# Patient Record
Sex: FEMALE | Race: WHITE | NOT HISPANIC OR LATINO | Employment: FULL TIME | URBAN - NONMETROPOLITAN AREA
[De-identification: names, ages, dates, MRNs, and addresses within clinical notes are randomized per-mention and may not be internally consistent; named-entity substitution may affect disease eponyms.]

---

## 2017-06-22 ENCOUNTER — TRANSCRIBE ORDERS (OUTPATIENT)
Dept: ADMINISTRATIVE | Facility: HOSPITAL | Age: 32
End: 2017-06-22

## 2017-06-22 DIAGNOSIS — E04.0 NONTOXIC (DIFFUSE) GOITER: Primary | ICD-10-CM

## 2021-03-18 ENCOUNTER — BULK ORDERING (OUTPATIENT)
Dept: CASE MANAGEMENT | Facility: OTHER | Age: 36
End: 2021-03-18

## 2021-03-18 DIAGNOSIS — Z23 IMMUNIZATION DUE: ICD-10-CM

## 2022-02-03 ENCOUNTER — OFFICE VISIT (OUTPATIENT)
Dept: CARDIOLOGY | Facility: CLINIC | Age: 37
End: 2022-02-03

## 2022-02-03 VITALS
BODY MASS INDEX: 26.03 KG/M2 | WEIGHT: 162 LBS | DIASTOLIC BLOOD PRESSURE: 76 MMHG | OXYGEN SATURATION: 99 % | TEMPERATURE: 97.8 F | SYSTOLIC BLOOD PRESSURE: 118 MMHG | HEIGHT: 66 IN | HEART RATE: 79 BPM

## 2022-02-03 DIAGNOSIS — Z13.220 SCREENING, LIPID: ICD-10-CM

## 2022-02-03 DIAGNOSIS — F32.A DEPRESSION, UNSPECIFIED DEPRESSION TYPE: ICD-10-CM

## 2022-02-03 DIAGNOSIS — F41.9 ANXIETY: ICD-10-CM

## 2022-02-03 DIAGNOSIS — R00.2 PALPITATIONS: Primary | ICD-10-CM

## 2022-02-03 DIAGNOSIS — K21.9 GASTROESOPHAGEAL REFLUX DISEASE WITHOUT ESOPHAGITIS: ICD-10-CM

## 2022-02-03 DIAGNOSIS — R07.2 PRECORDIAL PAIN: ICD-10-CM

## 2022-02-03 DIAGNOSIS — R53.83 FATIGUE, UNSPECIFIED TYPE: ICD-10-CM

## 2022-02-03 PROCEDURE — 93246 EXT ECG>7D<15D RECORDING: CPT | Performed by: INTERNAL MEDICINE

## 2022-02-03 PROCEDURE — 99204 OFFICE O/P NEW MOD 45 MIN: CPT | Performed by: NURSE PRACTITIONER

## 2022-02-03 RX ORDER — PANTOPRAZOLE SODIUM 40 MG/1
40 TABLET, DELAYED RELEASE ORAL DAILY
Qty: 90 TABLET | Refills: 1 | Status: SHIPPED | OUTPATIENT
Start: 2022-02-03 | End: 2023-01-13 | Stop reason: SDUPTHER

## 2022-02-03 RX ORDER — FLUOXETINE HYDROCHLORIDE 20 MG/1
20 CAPSULE ORAL DAILY
Qty: 90 CAPSULE | Refills: 0 | Status: SHIPPED | OUTPATIENT
Start: 2022-02-03 | End: 2022-03-17 | Stop reason: SDUPTHER

## 2022-02-03 NOTE — PROGRESS NOTES
Subjective   Diallo Spence is a 36 y.o. female.   Chief Complaint   Patient presents with   • Establish Care      History of Present Illness   Sridhar Song is a 35-year-old  female who presents to the clinic today as a new patient for evaluation.    Complaints of palpitations that have been going on for several years however seems to have worsened over the last several months.  She reports about 10 years ago she underwent a monitor and echocardiogram which were normal.  She saw cardiology about a year ago and underwent a holter monitor showing sinus bradycardia and sinus tachycardia.  She has not previously been on medications.  She denies any history of anemia or thyroid problems.  She reports symptoms seem to be worse with activity to the point it has limited her exercise because she is very symptomatic with tachycardia.  She states at times she has felt chest pressure.  She denies dyspnea.  She has occasionally experience lightheadedness but denies near syncope or syncope.  She does try to limit her caffeine intake.  No family history of A. fib or arrhythmias that she is aware of.    History of anxiety and depression and has been on Cymbalta recently however she has not been taking that for several weeks and prior to those several weeks had missed several doses.  He has been on Trintellix, Wellbutrin, Celexa, Zoloft and Prozac in the past.  She feels she tolerated Prozac the best and would like to consider starting back on Prozac.    GERD stable on Protonix, request refill    The following portions of the patient's history were reviewed and updated as appropriate: allergies, current medications, past family history, past medical history, past social history, past surgical history and problem list.    Current Outpatient Medications:   •  FLUoxetine (PROzac) 20 MG capsule, Take 1 capsule by mouth Daily., Disp: 90 capsule, Rfl: 0  •  pantoprazole (Protonix) 40 MG EC tablet, Take 1 tablet by  mouth Daily., Disp: 90 tablet, Rfl: 1    Review of Systems   Constitutional: Negative for activity change, appetite change, chills, diaphoresis, fatigue and fever.   HENT: Negative for congestion, drooling, ear discharge, ear pain, mouth sores, nosebleeds, postnasal drip, rhinorrhea, sinus pressure, sneezing and sore throat.    Eyes: Negative for pain, discharge and visual disturbance.   Respiratory: Negative for cough, chest tightness, shortness of breath and wheezing.    Cardiovascular: Positive for palpitations. Negative for chest pain and leg swelling.   Gastrointestinal: Negative for abdominal pain, constipation, diarrhea, nausea and vomiting.   Endocrine: Negative for cold intolerance, heat intolerance, polydipsia, polyphagia and polyuria.   Musculoskeletal: Negative for arthralgias, myalgias and neck pain.   Skin: Negative for rash and wound.   Neurological: Negative for syncope, speech difficulty, weakness, light-headedness and headaches.   Hematological: Negative for adenopathy. Does not bruise/bleed easily.   Psychiatric/Behavioral: Negative for confusion, dysphoric mood and sleep disturbance. The patient is not nervous/anxious.    All other systems reviewed and are negative.    Patient Active Problem List    Diagnosis    • Gastroesophageal reflux disease without esophagitis [K21.9]    • Anxiety [F41.9]    • Depression [F32.A]    • Palpitations [R00.2]      Past Medical History:   Diagnosis Date   • Depression    • GERD (gastroesophageal reflux disease)    • Liver problem    • Migraine headache      Past Surgical History:   Procedure Laterality Date   •  SECTION     • CHOLECYSTECTOMY     • COLONOSCOPY      / BEBETO Reynolds- Normal    • COLONOSCOPY N/A 2016    Procedure: COLONOSCOPY CPT CODE: 40725;  Surgeon: Sherry Muse DO;  Location: [facility];  Service:    • ENDOSCOPY N/A 2016    Procedure: ESOPHAGOGASTRODUODENOSCOPY WITH BIOPSY CPT CODE: 79849;  Surgeon: Sherry Fung  "Eliudon, DO;  Location: [facility] OR;  Service:    • GALLBLADDER SURGERY     • NERVE SURGERY      Right arm    • SHOULDER SURGERY     • TONSILLECTOMY     • UPPER GASTROINTESTINAL ENDOSCOPY  2010    Dr. Jeter/ Small hiatal hernia      /76 (BP Location: Left arm, Patient Position: Sitting, Cuff Size: Adult)   Pulse 79   Temp 97.8 °F (36.6 °C)   Ht 167.6 cm (66\")   Wt 73.5 kg (162 lb)   SpO2 99%   BMI 26.15 kg/m²     Objective   Allergies   Allergen Reactions   • Latex          Physical Exam  Vitals reviewed.   Constitutional:       Appearance: Normal appearance. She is well-developed.   HENT:      Head: Normocephalic.   Eyes:      Conjunctiva/sclera: Conjunctivae normal.   Neck:      Thyroid: No thyromegaly.      Vascular: No carotid bruit or JVD.   Cardiovascular:      Rate and Rhythm: Normal rate and regular rhythm.   Pulmonary:      Effort: Pulmonary effort is normal.      Breath sounds: Normal breath sounds.   Abdominal:      General: Bowel sounds are normal.      Palpations: Abdomen is soft. There is no hepatomegaly, splenomegaly or mass.   Musculoskeletal:      Cervical back: Normal range of motion and neck supple.      Right lower leg: No edema.      Left lower leg: No edema.   Skin:     General: Skin is warm and dry.   Neurological:      Mental Status: She is alert and oriented to person, place, and time.   Psychiatric:         Attention and Perception: Attention normal.         Mood and Affect: Mood normal.         Speech: Speech normal.         Behavior: Behavior normal. Behavior is cooperative.         Cognition and Memory: Cognition normal.           ECG 12 Lead    Date/Time: 2/4/2022 1:39 PM  Performed by: Cinda Alvarado APRN  Authorized by: Cinda Alvarado APRN   Rhythm: sinus rhythm  Rate: normal  BPM: 79    Clinical impression: normal ECG  Comments: QT/QTc-355/389          Assessment/Plan   Diagnoses and all orders for this visit:    1. Palpitations (Primary)  -     CBC & Differential; " Future  -     TSH; Future  -     T4, Free; Future  -     ECG 12 Lead  -     Adult Transthoracic Echo Complete W/ Cont if Necessary Per Protocol; Future  -     Holter Monitor - 72 Hour Up To 15 Days; Future  EKG, reviewed and discussed  Reviewed prior Holter monitor  Counseling in avoidance of caffeine utilization    2. Anxiety  Discussed at length risk and benefits of SSRIs.  She will reinitiate Prozac 20 mg daily.  She understands it may take 2 to 4 weeks before fully efficacious.  We discussed pregnancy classification as she is a woman of childbearing age.    3. Depression, unspecified depression type    4. Precordial pain  -     Treadmill Stress Test; Future    5. Gastroesophageal reflux disease without esophagitis  Refill Protonix    6. Fatigue, unspecified type  -     CBC & Differential; Future  -     TSH; Future  -     T4, Free; Future  -     Vitamin B12; Future  -     Vitamin D 25 Hydroxy; Future    7. Screening, lipid  -     Comprehensive Metabolic Panel; Future  -     Lipid Panel; Future    Other orders  -     FLUoxetine (PROzac) 20 MG capsule; Take 1 capsule by mouth Daily.  Dispense: 90 capsule; Refill: 0  -     pantoprazole (Protonix) 40 MG EC tablet; Take 1 tablet by mouth Daily.  Dispense: 90 tablet; Refill: 1    Further testing will be arranged  Follow-up in 6 weeks, sooner if needed

## 2022-02-04 PROBLEM — K21.9 GASTROESOPHAGEAL REFLUX DISEASE WITHOUT ESOPHAGITIS: Status: ACTIVE | Noted: 2022-02-04

## 2022-02-04 PROCEDURE — 93000 ELECTROCARDIOGRAM COMPLETE: CPT | Performed by: NURSE PRACTITIONER

## 2022-02-11 ENCOUNTER — HOSPITAL ENCOUNTER (OUTPATIENT)
Dept: CARDIOLOGY | Facility: HOSPITAL | Age: 37
Discharge: HOME | End: 2022-02-11

## 2022-02-11 DIAGNOSIS — R00.2 PALPITATIONS: ICD-10-CM

## 2022-02-11 DIAGNOSIS — R07.2 PRECORDIAL PAIN: ICD-10-CM

## 2022-02-11 PROCEDURE — 93306 TTE W/DOPPLER COMPLETE: CPT | Performed by: INTERNAL MEDICINE

## 2022-02-11 PROCEDURE — 93306 TTE W/DOPPLER COMPLETE: CPT

## 2022-02-11 PROCEDURE — 93017 CV STRESS TEST TRACING ONLY: CPT

## 2022-02-11 PROCEDURE — 93018 CV STRESS TEST I&R ONLY: CPT | Performed by: INTERNAL MEDICINE

## 2022-02-14 LAB
BH CV ECHO MEAS - ACS: 1.7 CM
BH CV ECHO MEAS - AO MAX PG (FULL): 0.85 MMHG
BH CV ECHO MEAS - AO MAX PG: 4.9 MMHG
BH CV ECHO MEAS - AO MEAN PG (FULL): 1 MMHG
BH CV ECHO MEAS - AO MEAN PG: 3 MMHG
BH CV ECHO MEAS - AO ROOT AREA (BSA CORRECTED): 1.5
BH CV ECHO MEAS - AO ROOT AREA: 6.2 CM^2
BH CV ECHO MEAS - AO ROOT DIAM: 2.8 CM
BH CV ECHO MEAS - AO V2 MAX: 111 CM/SEC
BH CV ECHO MEAS - AO V2 MEAN: 81.7 CM/SEC
BH CV ECHO MEAS - AO V2 VTI: 22.4 CM
BH CV ECHO MEAS - BSA(HAYCOCK): 1.9 M^2
BH CV ECHO MEAS - BSA: 1.8 M^2
BH CV ECHO MEAS - BZI_BMI: 26.1 KILOGRAMS/M^2
BH CV ECHO MEAS - BZI_METRIC_HEIGHT: 167.6 CM
BH CV ECHO MEAS - BZI_METRIC_WEIGHT: 73.5 KG
BH CV ECHO MEAS - EDV(CUBED): 74.1 ML
BH CV ECHO MEAS - EDV(MOD-SP2): 79.4 ML
BH CV ECHO MEAS - EDV(MOD-SP4): 72.1 ML
BH CV ECHO MEAS - EDV(TEICH): 78.6 ML
BH CV ECHO MEAS - EF(CUBED): 70.4 %
BH CV ECHO MEAS - EF(MOD-SP2): 62.1 %
BH CV ECHO MEAS - EF(MOD-SP4): 72 %
BH CV ECHO MEAS - EF(TEICH): 62.4 %
BH CV ECHO MEAS - ESV(CUBED): 22 ML
BH CV ECHO MEAS - ESV(MOD-SP2): 30.1 ML
BH CV ECHO MEAS - ESV(MOD-SP4): 20.2 ML
BH CV ECHO MEAS - ESV(TEICH): 29.6 ML
BH CV ECHO MEAS - FS: 33.3 %
BH CV ECHO MEAS - IVS/LVPW: 1
BH CV ECHO MEAS - IVSD: 0.5 CM
BH CV ECHO MEAS - LA DIMENSION: 3.1 CM
BH CV ECHO MEAS - LA/AO: 1.1
BH CV ECHO MEAS - LV DIASTOLIC VOL/BSA (35-75): 39.4 ML/M^2
BH CV ECHO MEAS - LV MASS(C)D: 55.9 GRAMS
BH CV ECHO MEAS - LV MASS(C)DI: 30.6 GRAMS/M^2
BH CV ECHO MEAS - LV MAX PG: 4.1 MMHG
BH CV ECHO MEAS - LV MEAN PG: 2 MMHG
BH CV ECHO MEAS - LV SYSTOLIC VOL/BSA (12-30): 11 ML/M^2
BH CV ECHO MEAS - LV V1 MAX: 101 CM/SEC
BH CV ECHO MEAS - LV V1 MEAN: 65.6 CM/SEC
BH CV ECHO MEAS - LV V1 VTI: 19 CM
BH CV ECHO MEAS - LVIDD: 4.2 CM
BH CV ECHO MEAS - LVIDS: 2.8 CM
BH CV ECHO MEAS - LVLD AP2: 8 CM
BH CV ECHO MEAS - LVLD AP4: 7.4 CM
BH CV ECHO MEAS - LVLS AP2: 6.7 CM
BH CV ECHO MEAS - LVLS AP4: 5.6 CM
BH CV ECHO MEAS - LVPWD: 0.5 CM
BH CV ECHO MEAS - MV A MAX VEL: 54.4 CM/SEC
BH CV ECHO MEAS - MV DEC SLOPE: 480 CM/SEC^2
BH CV ECHO MEAS - MV DEC TIME: 0.18 SEC
BH CV ECHO MEAS - MV E MAX VEL: 87.8 CM/SEC
BH CV ECHO MEAS - MV E/A: 1.6
BH CV ECHO MEAS - MV MAX PG: 3 MMHG
BH CV ECHO MEAS - MV MEAN PG: 1 MMHG
BH CV ECHO MEAS - MV V2 MAX: 86.2 CM/SEC
BH CV ECHO MEAS - MV V2 MEAN: 48.6 CM/SEC
BH CV ECHO MEAS - MV V2 VTI: 17.7 CM
BH CV ECHO MEAS - PA ACC TIME: 0.11 SEC
BH CV ECHO MEAS - PA MAX PG: 3.5 MMHG
BH CV ECHO MEAS - PA MEAN PG: 2 MMHG
BH CV ECHO MEAS - PA PR(ACCEL): 30 MMHG
BH CV ECHO MEAS - PA V2 MAX: 93.3 CM/SEC
BH CV ECHO MEAS - PA V2 MEAN: 70.7 CM/SEC
BH CV ECHO MEAS - PA V2 VTI: 17.1 CM
BH CV ECHO MEAS - PULM A REVS DUR: 0.13 SEC
BH CV ECHO MEAS - PULM A REVS VEL: 34.3 CM/SEC
BH CV ECHO MEAS - PULM DIAS VEL: 39 CM/SEC
BH CV ECHO MEAS - PULM S/D: 0.85
BH CV ECHO MEAS - PULM SYS VEL: 33 CM/SEC
BH CV ECHO MEAS - RAP SYSTOLE: 10 MMHG
BH CV ECHO MEAS - RVSP: 23.1 MMHG
BH CV ECHO MEAS - SI(AO): 75.4 ML/M^2
BH CV ECHO MEAS - SI(CUBED): 28.5 ML/M^2
BH CV ECHO MEAS - SI(MOD-SP2): 27 ML/M^2
BH CV ECHO MEAS - SI(MOD-SP4): 28.4 ML/M^2
BH CV ECHO MEAS - SI(TEICH): 26.8 ML/M^2
BH CV ECHO MEAS - SV(AO): 137.9 ML
BH CV ECHO MEAS - SV(CUBED): 52.1 ML
BH CV ECHO MEAS - SV(MOD-SP2): 49.3 ML
BH CV ECHO MEAS - SV(MOD-SP4): 51.9 ML
BH CV ECHO MEAS - SV(TEICH): 49 ML
BH CV ECHO MEAS - TR MAX VEL: 181 CM/SEC
BH CV STRESS BP STAGE 1: NORMAL
BH CV STRESS BP STAGE 2: NORMAL
BH CV STRESS BP STAGE 3: NORMAL
BH CV STRESS DURATION MIN STAGE 1: 3
BH CV STRESS DURATION MIN STAGE 2: 3
BH CV STRESS DURATION MIN STAGE 3: 3
BH CV STRESS DURATION SEC STAGE 1: 0
BH CV STRESS DURATION SEC STAGE 2: 0
BH CV STRESS DURATION SEC STAGE 3: 0
BH CV STRESS GRADE STAGE 1: 10
BH CV STRESS GRADE STAGE 2: 12
BH CV STRESS GRADE STAGE 3: 14
BH CV STRESS HR STAGE 1: 120
BH CV STRESS HR STAGE 2: 154
BH CV STRESS HR STAGE 3: 179
BH CV STRESS METS STAGE 1: 5
BH CV STRESS METS STAGE 2: 7.5
BH CV STRESS METS STAGE 3: 10
BH CV STRESS PROTOCOL 1: NORMAL
BH CV STRESS RECOVERY BP: NORMAL MMHG
BH CV STRESS RECOVERY HR: 105 BPM
BH CV STRESS SPEED STAGE 1: 1.7
BH CV STRESS SPEED STAGE 2: 2.5
BH CV STRESS SPEED STAGE 3: 3.4
BH CV STRESS STAGE 1: 1
BH CV STRESS STAGE 2: 2
BH CV STRESS STAGE 3: 3
MAXIMAL PREDICTED HEART RATE: 186 BPM
MAXIMAL PREDICTED HEART RATE: 186 BPM
PERCENT MAX PREDICTED HR: 96.24 %
STRESS BASELINE BP: NORMAL MMHG
STRESS BASELINE HR: 86 BPM
STRESS PERCENT HR: 113 %
STRESS POST ESTIMATED WORKLOAD: 10.1 METS
STRESS POST EXERCISE DUR MIN: 9 MIN
STRESS POST PEAK BP: NORMAL MMHG
STRESS POST PEAK HR: 179 BPM
STRESS TARGET HR: 158 BPM
STRESS TARGET HR: 158 BPM

## 2022-02-15 DIAGNOSIS — F98.8 ATTENTION DEFICIT DISORDER (ADD) IN ADULT: Primary | ICD-10-CM

## 2022-02-22 ENCOUNTER — TELEPHONE (OUTPATIENT)
Dept: CARDIOLOGY | Facility: CLINIC | Age: 37
End: 2022-02-22

## 2022-02-22 ENCOUNTER — TREATMENT (OUTPATIENT)
Dept: CARDIOLOGY | Facility: CLINIC | Age: 37
End: 2022-02-22

## 2022-02-22 DIAGNOSIS — I44.30 AV BLOCK: Primary | ICD-10-CM

## 2022-02-22 DIAGNOSIS — R00.2 PALPITATIONS: ICD-10-CM

## 2022-02-22 PROCEDURE — 93248 EXT ECG>7D<15D REV&INTERPJ: CPT | Performed by: INTERNAL MEDICINE

## 2022-02-22 NOTE — TELEPHONE ENCOUNTER
----- Message from JAX Jamison sent at 2/22/2022 11:26 AM EST -----  Holter monitor with episodes of sinus tachycardia. 1 episode of AV block recommend EP referral for second opinion.  ThanksCinda   ----- Message -----  From: Diallo Spence MA  Sent: 2/22/2022  10:10 AM EST  To: JAX Jamison    Please review EOS holter monitor.      ThanksDiallo

## 2022-03-17 ENCOUNTER — OFFICE VISIT (OUTPATIENT)
Dept: PSYCHIATRY | Facility: CLINIC | Age: 37
End: 2022-03-17

## 2022-03-17 VITALS
WEIGHT: 167.6 LBS | DIASTOLIC BLOOD PRESSURE: 70 MMHG | HEART RATE: 88 BPM | SYSTOLIC BLOOD PRESSURE: 114 MMHG | HEIGHT: 66 IN | BODY MASS INDEX: 26.93 KG/M2

## 2022-03-17 DIAGNOSIS — F90.2 ATTENTION DEFICIT HYPERACTIVITY DISORDER (ADHD), COMBINED TYPE: ICD-10-CM

## 2022-03-17 DIAGNOSIS — F39 UNSPECIFIED MOOD (AFFECTIVE) DISORDER: ICD-10-CM

## 2022-03-17 DIAGNOSIS — F41.9 ANXIETY DISORDER, UNSPECIFIED TYPE: Primary | ICD-10-CM

## 2022-03-17 PROCEDURE — 90792 PSYCH DIAG EVAL W/MED SRVCS: CPT | Performed by: NURSE PRACTITIONER

## 2022-03-17 RX ORDER — SUMATRIPTAN 25 MG/1
25 TABLET, FILM COATED ORAL ONCE AS NEEDED
Qty: 9 TABLET | Refills: 1 | Status: SHIPPED | OUTPATIENT
Start: 2022-03-17 | End: 2022-04-27 | Stop reason: SDUPTHER

## 2022-03-17 RX ORDER — FLUOXETINE HYDROCHLORIDE 20 MG/1
CAPSULE ORAL
Qty: 90 CAPSULE | Refills: 0
Start: 2022-03-17 | End: 2022-04-01 | Stop reason: SDUPTHER

## 2022-03-17 NOTE — PROGRESS NOTES
She reports a history of migraine headaches and been previously on Topamax and Imitrex.  She would like to have something to help with her headaches.  She is currently being evaluated by psych/mental health for adult ADD and is awaiting an EP evaluation.  Rx sent for Imitrex to use as needed for headaches.  We discussed the increased risk of serotonin syndrome with her current Prozac we have also discussed preventative measures of beta-blocker therapy or Topamax however we will wait until she is evaluated by other providers before proceeding with a preventative medication.

## 2022-03-18 ENCOUNTER — LAB (OUTPATIENT)
Dept: LAB | Facility: HOSPITAL | Age: 37
End: 2022-03-18

## 2022-03-18 DIAGNOSIS — R53.83 FATIGUE, UNSPECIFIED TYPE: ICD-10-CM

## 2022-03-18 DIAGNOSIS — R00.2 PALPITATIONS: ICD-10-CM

## 2022-03-18 DIAGNOSIS — Z13.220 SCREENING, LIPID: ICD-10-CM

## 2022-03-18 DIAGNOSIS — R53.83 FATIGUE, UNSPECIFIED TYPE: Primary | ICD-10-CM

## 2022-03-18 LAB
25(OH)D3 SERPL-MCNC: 20.6 NG/ML (ref 30–100)
ALBUMIN SERPL-MCNC: 4.7 G/DL (ref 3.5–5.2)
ALBUMIN/GLOB SERPL: 1.7 G/DL
ALP SERPL-CCNC: 69 U/L (ref 39–117)
ALT SERPL W P-5'-P-CCNC: 20 U/L (ref 1–33)
ANION GAP SERPL CALCULATED.3IONS-SCNC: 8.5 MMOL/L (ref 5–15)
AST SERPL-CCNC: 19 U/L (ref 1–32)
BASOPHILS # BLD AUTO: 0.07 10*3/MM3 (ref 0–0.2)
BASOPHILS NFR BLD AUTO: 1.2 % (ref 0–1.5)
BILIRUB SERPL-MCNC: 0.5 MG/DL (ref 0–1.2)
BUN SERPL-MCNC: 12 MG/DL (ref 6–20)
BUN/CREAT SERPL: 14.8 (ref 7–25)
CALCIUM SPEC-SCNC: 9.8 MG/DL (ref 8.6–10.5)
CHLORIDE SERPL-SCNC: 104 MMOL/L (ref 98–107)
CHOLEST SERPL-MCNC: 182 MG/DL (ref 0–200)
CO2 SERPL-SCNC: 23.5 MMOL/L (ref 22–29)
CREAT SERPL-MCNC: 0.81 MG/DL (ref 0.57–1)
DEPRECATED RDW RBC AUTO: 40.7 FL (ref 37–54)
EGFRCR SERPLBLD CKD-EPI 2021: 97.2 ML/MIN/1.73
EOSINOPHIL # BLD AUTO: 0.08 10*3/MM3 (ref 0–0.4)
EOSINOPHIL NFR BLD AUTO: 1.3 % (ref 0.3–6.2)
ERYTHROCYTE [DISTWIDTH] IN BLOOD BY AUTOMATED COUNT: 12.4 % (ref 12.3–15.4)
GLOBULIN UR ELPH-MCNC: 2.8 GM/DL
GLUCOSE SERPL-MCNC: 83 MG/DL (ref 65–99)
HCT VFR BLD AUTO: 40.5 % (ref 34–46.6)
HDLC SERPL-MCNC: 52 MG/DL (ref 40–60)
HGB BLD-MCNC: 13.4 G/DL (ref 12–15.9)
IMM GRANULOCYTES # BLD AUTO: 0.01 10*3/MM3 (ref 0–0.05)
IMM GRANULOCYTES NFR BLD AUTO: 0.2 % (ref 0–0.5)
LDLC SERPL CALC-MCNC: 118 MG/DL (ref 0–100)
LDLC/HDLC SERPL: 2.26 {RATIO}
LYMPHOCYTES # BLD AUTO: 2 10*3/MM3 (ref 0.7–3.1)
LYMPHOCYTES NFR BLD AUTO: 33.3 % (ref 19.6–45.3)
MCH RBC QN AUTO: 29.8 PG (ref 26.6–33)
MCHC RBC AUTO-ENTMCNC: 33.1 G/DL (ref 31.5–35.7)
MCV RBC AUTO: 90.2 FL (ref 79–97)
MONOCYTES # BLD AUTO: 0.36 10*3/MM3 (ref 0.1–0.9)
MONOCYTES NFR BLD AUTO: 6 % (ref 5–12)
NEUTROPHILS NFR BLD AUTO: 3.48 10*3/MM3 (ref 1.7–7)
NEUTROPHILS NFR BLD AUTO: 58 % (ref 42.7–76)
NRBC BLD AUTO-RTO: 0 /100 WBC (ref 0–0.2)
PLATELET # BLD AUTO: 330 10*3/MM3 (ref 140–450)
PMV BLD AUTO: 10.2 FL (ref 6–12)
POTASSIUM SERPL-SCNC: 4.3 MMOL/L (ref 3.5–5.2)
PROT SERPL-MCNC: 7.5 G/DL (ref 6–8.5)
RBC # BLD AUTO: 4.49 10*6/MM3 (ref 3.77–5.28)
SODIUM SERPL-SCNC: 136 MMOL/L (ref 136–145)
T4 FREE SERPL-MCNC: 1.21 NG/DL (ref 0.93–1.7)
TRIGL SERPL-MCNC: 63 MG/DL (ref 0–150)
TSH SERPL DL<=0.05 MIU/L-ACNC: 1.85 UIU/ML (ref 0.27–4.2)
VIT B12 BLD-MCNC: 586 PG/ML (ref 211–946)
VLDLC SERPL-MCNC: 12 MG/DL (ref 5–40)
WBC NRBC COR # BLD: 6 10*3/MM3 (ref 3.4–10.8)

## 2022-03-18 PROCEDURE — 84439 ASSAY OF FREE THYROXINE: CPT

## 2022-03-18 PROCEDURE — 82306 VITAMIN D 25 HYDROXY: CPT

## 2022-03-18 PROCEDURE — 80061 LIPID PANEL: CPT

## 2022-03-18 PROCEDURE — 36415 COLL VENOUS BLD VENIPUNCTURE: CPT

## 2022-03-18 PROCEDURE — 82607 VITAMIN B-12: CPT

## 2022-03-18 PROCEDURE — 80050 GENERAL HEALTH PANEL: CPT

## 2022-03-22 ENCOUNTER — OFFICE VISIT (OUTPATIENT)
Dept: CARDIOLOGY | Facility: CLINIC | Age: 37
End: 2022-03-22

## 2022-03-22 VITALS
HEART RATE: 88 BPM | WEIGHT: 167 LBS | HEIGHT: 66 IN | BODY MASS INDEX: 26.84 KG/M2 | DIASTOLIC BLOOD PRESSURE: 68 MMHG | OXYGEN SATURATION: 99 % | SYSTOLIC BLOOD PRESSURE: 96 MMHG | TEMPERATURE: 97.1 F

## 2022-03-22 DIAGNOSIS — G43.909 MIGRAINE WITHOUT STATUS MIGRAINOSUS, NOT INTRACTABLE, UNSPECIFIED MIGRAINE TYPE: ICD-10-CM

## 2022-03-22 DIAGNOSIS — F41.9 ANXIETY: ICD-10-CM

## 2022-03-22 DIAGNOSIS — F32.A DEPRESSION, UNSPECIFIED DEPRESSION TYPE: ICD-10-CM

## 2022-03-22 DIAGNOSIS — R00.2 PALPITATIONS: Primary | ICD-10-CM

## 2022-03-22 DIAGNOSIS — E55.9 VITAMIN D DEFICIENCY: ICD-10-CM

## 2022-03-22 DIAGNOSIS — K21.9 GASTROESOPHAGEAL REFLUX DISEASE WITHOUT ESOPHAGITIS: ICD-10-CM

## 2022-03-22 PROCEDURE — 99213 OFFICE O/P EST LOW 20 MIN: CPT | Performed by: NURSE PRACTITIONER

## 2022-03-22 RX ORDER — MELATONIN
1000 DAILY
Qty: 30 TABLET | Refills: 5 | Status: SHIPPED | OUTPATIENT
Start: 2022-03-22 | End: 2022-04-08

## 2022-04-01 ENCOUNTER — OFFICE VISIT (OUTPATIENT)
Dept: PSYCHIATRY | Facility: CLINIC | Age: 37
End: 2022-04-01

## 2022-04-01 VITALS
BODY MASS INDEX: 26.63 KG/M2 | WEIGHT: 165 LBS | DIASTOLIC BLOOD PRESSURE: 60 MMHG | HEART RATE: 79 BPM | SYSTOLIC BLOOD PRESSURE: 114 MMHG

## 2022-04-01 DIAGNOSIS — F41.9 ANXIETY DISORDER, UNSPECIFIED TYPE: ICD-10-CM

## 2022-04-01 DIAGNOSIS — F39 UNSPECIFIED MOOD (AFFECTIVE) DISORDER: ICD-10-CM

## 2022-04-01 DIAGNOSIS — F90.2 ATTENTION DEFICIT HYPERACTIVITY DISORDER (ADHD), COMBINED TYPE: Primary | ICD-10-CM

## 2022-04-01 PROCEDURE — 99213 OFFICE O/P EST LOW 20 MIN: CPT | Performed by: NURSE PRACTITIONER

## 2022-04-01 RX ORDER — LISDEXAMFETAMINE DIMESYLATE 10 MG/1
10 CAPSULE ORAL DAILY
Qty: 30 CAPSULE | Refills: 0 | Status: SHIPPED | OUTPATIENT
Start: 2022-04-01 | End: 2022-04-08 | Stop reason: ALTCHOICE

## 2022-04-01 RX ORDER — FLUOXETINE HYDROCHLORIDE 40 MG/1
40 CAPSULE ORAL DAILY
Qty: 30 CAPSULE | Refills: 0
Start: 2022-04-01 | End: 2022-04-12 | Stop reason: SDUPTHER

## 2022-04-01 RX ORDER — LISDEXAMFETAMINE DIMESYLATE 10 MG/1
10 CAPSULE ORAL DAILY
Qty: 30 CAPSULE | Refills: 0 | Status: SHIPPED | OUTPATIENT
Start: 2022-04-01 | End: 2022-04-01 | Stop reason: SDUPTHER

## 2022-04-01 NOTE — PROGRESS NOTES
Subjective   Diallo Spence is a 37 y.o. female is here today for medication management follow-up.    Chief Complaint:  ADHD anxiety     History of Present Illness:    Patient presents today for a follow up for medication management for ADHD and anxiety. Patient states she doing about the same with everything. Patient states going vacation next week. Patient states started prozac 40mg last Thursday and doing ok. Patient states still trouble with anger, irritability, mind racing, and frustration. Patient states daughter has been getting to her more due to daughter being more restless and hyper. Patient states the noises really get to her especially high pitched. Denies any panic attacks recently but waiting. Patient states her depression is at an 8 on a 0-10 scale with 10 being the worst. Patient states her anxiety has been an 8 on a 0-10 scale with 10 being the worst. Patient states her sleep is worse and sleeping more through the day. Patient states she has been nodding off at work and not sleeping good at night. Denies any nightmares and only getting a few hours a night. Patient states her appetite is about the same. Denies any thoughts to harm self or others. Denies any auditory or visual hallucinations. Patient states still getting distracted at work and doctor getting upset. Denies any medical changes since last visit.   The following portions of the patient's history were reviewed and updated as appropriate: allergies, current medications, past family history, past medical history, past social history, past surgical history and problem list.    Review of Systems   Constitutional: Negative.    Respiratory: Negative.    Cardiovascular: Negative.    Gastrointestinal: Negative.    Neurological: Negative.    Psychiatric/Behavioral: Positive for agitation, decreased concentration, dysphoric mood and sleep disturbance. The patient is nervous/anxious.        Objective   Physical Exam  Vitals reviewed.    Constitutional:       Appearance: Normal appearance. She is well-developed and well-groomed.   Neurological:      Mental Status: She is alert.   Psychiatric:         Attention and Perception: Attention and perception normal.         Mood and Affect: Affect normal. Mood is anxious.         Speech: Speech normal.         Behavior: Behavior normal. Behavior is cooperative.         Thought Content: Thought content normal.         Cognition and Memory: Cognition and memory normal.         Judgment: Judgment normal.       Blood pressure 114/60, pulse 79, weight 74.8 kg (165 lb). Body mass index is 26.63 kg/m².    Allergies   Allergen Reactions   • Latex      r  Medication List:   Current Outpatient Medications   Medication Sig Dispense Refill   • Cholecalciferol (Vitamin D3) 25 MCG (1000 UT) capsule Take 1 capsule by mouth Daily. 60 capsule 0   • FLUoxetine (PROzac) 40 MG capsule Take 1 capsule by mouth Daily. 90 capsule 1   • methylphenidate (Ritalin) 5 MG tablet Take 1 tablet by mouth 2 (Two) Times a Day with second dose no later than 2pm. 60 tablet 0   • metoprolol succinate XL (TOPROL-XL) 25 MG 24 hr tablet Take 1/2 tablet by mouth Daily. 30 tablet 3   • pantoprazole (Protonix) 40 MG EC tablet Take 1 tablet by mouth Daily. 90 tablet 1   • SUMAtriptan (Imitrex) 25 MG tablet Take 1 tablet by mouth 1 Time As Needed for Migraine. Take 1 tablet at onset of headache. May repeat dose 1 time in 2 hours if headache not relieved. 9 tablet 1     No current facility-administered medications for this visit.       Mental Status Exam:   Hygiene:   good  Cooperation:  Cooperative  Eye Contact:  Good  Psychomotor Behavior:  Appropriate  Affect:  Appropriate  Hopelessness: Denies  Speech:  Normal  Thought Process:  Goal directed and Linear  Thought Content:  Normal  Suicidal:  None  Homicidal:  None  Hallucinations:  None  Delusion:  None  Memory:  Intact  Orientation:  Person, Place, Time and Situation  Reliability:  fair  Insight:   Fair  Judgement:  Fair  Impulse Control:  Fair  Physical/Medical Issues:  No             Assessment/Plan   Diagnoses and all orders for this visit:    1. Attention deficit hyperactivity disorder (ADHD), combined type (Primary)  -     Discontinue: lisdexamfetamine dimesylate (Vyvanse) 10 MG capsule; Take 1 capsule by mouth Daily  Dispense: 30 capsule; Refill: 0  -     Discontinue: lisdexamfetamine dimesylate (Vyvanse) 10 MG capsule; Take 1 capsule by mouth Daily  Dispense: 30 capsule; Refill: 0    2. Anxiety disorder, unspecified type  -     Discontinue: FLUoxetine (PROzac) 40 MG capsule; Take 1 capsule by mouth Daily.  Dispense: 30 capsule; Refill: 0    3. Unspecified mood (affective) disorder (HCC)  -     Discontinue: FLUoxetine (PROzac) 40 MG capsule; Take 1 capsule by mouth Daily.  Dispense: 30 capsule; Refill: 0            Discussed medication options with patient. Start Vyvanse 10mg daily for ADHD. Cont. Prozac 40mg daily for depression and anxiety. Reviewed the risks, benefits, and side effects of the medications; patient acknowledged and verbally consented. Patient was provided education regarding treatment and treatment options.  Extensive education is provided regarding stimulants. Cardiac risk, risk of growth delay, weight loss, and cardiac issues. Patient is being prescribed a controlled substance as part of treatment plan.    Patient is agreeable to call the office with any questions, concerns, or worsening of symptoms. Patient is aware to call 911 or go to the nearest ER should begin having SI/HI.        Follow up in four weeks        Errors in dictation may reflect use of voice recognition software and not all errors in transcription may have been detected prior to signing.              This document has been electronically signed by JAX Avalos   April 19, 2022 13:28 EDT

## 2022-04-07 NOTE — PROGRESS NOTES
Cardiac Electrophysiology Outpatient Note  [facility]    Office Visit     Diallo Spence  [id number]  2022    Primary Care Physician: Cinda Alvarado APRN    Referred By: Cinda Alvarado APRN    CC: AV block, palpitations, sinus tachycardia    Problem List:  1. Palpitations  a. 14 day Holter monitor 2022: One non-conducted p wave at 3:51 AM (page 13 of report). Predominantly normal sinus rhythm average heart rate 79, minimum 59, max 177.  No atrial fibrillation.  PVC burden 1%  b. Echo 2022: EF 66 to 70%  c. Treadmill stress test 2022: Low risk study  2. GERD  3. Migraines  4. Anxiety  5. Depression  6. ADHD  7. Liver problem?  8. Panic disorder    History of Present Illness:   Diallo Spence is a 37 y.o. female who presents to the electrophysiology clinic for consultation regarding palpitations requested by JAX Ramirez.  She recently wore a Holter monitor for 14 days in 2022 which did reveal one non conducted p wave, EP consult was made. She originally sought care for palpitations and tachycardia. She began running several months ago and noticed that her heart rate would remain high (130s) until the day afterwards. Heart rate would get and remain higher with increased activity, reports maintained 180 bpm for ~ a couple hours. Associated dizziness, extreme fatigue afterwards. Some chest pain noted after exercise, resolved with time and limited activity, does not radiate. She is presenting today at the request of her cardiologist to check if it is okay for her to go on a beta-blocker for the previously mentioned problems.  She is also wondering if it is okay for her to start a Ritalin prescription for her ADD.    Past Surgical History:   Procedure Laterality Date   •  SECTION     • CHOLECYSTECTOMY     • COLONOSCOPY      / BEBETO Reynolds- Normal    • COLONOSCOPY N/A 2016    Procedure: COLONOSCOPY  CPT CODE: 71063;  Surgeon: Sherry Muse DO;  Location:  [facility] OR;  Service:    • ENDOSCOPY N/A 8/30/2016    Procedure: ESOPHAGOGASTRODUODENOSCOPY WITH BIOPSY CPT CODE: 53172;  Surgeon: Sherry Muse DO;  Location:  [facility] OR;  Service:    • GALLBLADDER SURGERY     • NERVE SURGERY      Right arm    • SHOULDER SURGERY     • TONSILLECTOMY     • UPPER GASTROINTESTINAL ENDOSCOPY  2010    Dr. Jeter/ Small hiatal hernia        Family History   Problem Relation Age of Onset   • No Known Problems Mother    • Diabetes Father    • Atrial fibrillation Father    • Hypertension Father    • Hyperlipidemia Father    • Drug abuse Brother    • Anxiety disorder Brother    • Alcohol abuse Brother    • Drug abuse Brother    • Depression Brother    • Heart disease Maternal Grandmother    • Bone cancer Paternal Grandfather    • Diabetes Paternal Grandfather    • Dementia Paternal Grandmother    • Heart disease Paternal Grandmother    • Diabetes Other    • Heart disease Other    • Hypertension Other        Social History     Socioeconomic History   • Marital status:      Spouse name: Davon Goel    • Number of children: 1   • Years of education: [employer]    Tobacco Use   • Smoking status: Never Smoker   • Smokeless tobacco: Never Used   Vaping Use   • Vaping Use: Never used   Substance and Sexual Activity   • Alcohol use: Yes     Comment: very limited socially   • Drug use: No   • Sexual activity: Yes         Current Outpatient Medications:   •  FLUoxetine (PROzac) 40 MG capsule, Take 1 capsule by mouth Daily., Disp: 30 capsule, Rfl: 0  •  pantoprazole (Protonix) 40 MG EC tablet, Take 1 tablet by mouth Daily., Disp: 90 tablet, Rfl: 1  •  SUMAtriptan (Imitrex) 25 MG tablet, Take 1 tablet by mouth 1 Time As Needed for Migraine. Take 1 tablet at onset of headache. May repeat dose 1 time in 2 hours if headache not relieved., Disp: 9 tablet, Rfl: 1    Allergies:   Allergies   Allergen Reactions   • Latex   "      Review of Systems:  Review of Systems   Cardiovascular: Positive for leg swelling. Negative for near-syncope and syncope.        Occasional leg swelling at the end of the day.   Respiratory: Negative for shortness of breath.         Vital Signs: Blood pressure 122/84, pulse 86, height 167.6 cm (66\"), weight 75.3 kg (166 lb), SpO2 99 %.    Physical Exam  Vitals and nursing note reviewed.   Cardiovascular:      Rate and Rhythm: Normal rate and regular rhythm.      Heart sounds: Normal heart sounds.      Comments: Sinus arrhythmia  Pulmonary:      Effort: Pulmonary effort is normal.      Breath sounds: Normal breath sounds.   Musculoskeletal:      Right lower leg: No edema.      Left lower leg: No edema.   Neurological:      Mental Status: She is alert and oriented to person, place, and time.   Psychiatric:         Behavior: Behavior is cooperative.         Lab Results   Component Value Date    GLUCOSE 83 03/18/2022    CALCIUM 9.8 03/18/2022     03/18/2022    K 4.3 03/18/2022    CO2 23.5 03/18/2022     03/18/2022    BUN 12 03/18/2022    CREATININE 0.81 03/18/2022    EGFRIFNONA 71 08/16/2016    BCR 14.8 03/18/2022    ANIONGAP 8.5 03/18/2022     Lab Results   Component Value Date    WBC 6.00 03/18/2022    HGB 13.4 03/18/2022    HCT 40.5 03/18/2022    MCV 90.2 03/18/2022     03/18/2022     No results found for: INR, PROTIME  Lab Results   Component Value Date    TSH 1.850 03/18/2022        Results for orders placed during the hospital encounter of 02/11/22    Adult Transthoracic Echo Complete W/ Cont if Necessary Per Protocol    Interpretation Summary  · Normal left ventricular cavity size and wall thickness noted.  · Left ventricular ejection fraction appears to be 66 - 70%.  · Left ventricular diastolic function was normal.  · Aortic valve is a trileaflet valve, there is no evidence of aortic stenosis or aortic regurgitation.  · Mitral valve echo shows some calcification of chordae, no mitral " stenosis or regurgitation detected.  · Tricuspid valve echo is normal no significant tricuspid regurgitation noted. No evidence of pulmonary hypertension.  · No pulmonic stenosis or regurgitation detected.  · Is no evidence of pericardial effusion.      I personally viewed and interpreted the patient's EKG/Telemetry/lab data.      ECG 12 Lead    Date/Time: 4/8/2022 2:02 PM  Performed by: Maribel Thomas APRN  Authorized by: Maribel Thomas APRN   Comparison: compared with previous ECG from 2/3/2022  Similar to previous ECG  Rhythm: sinus rhythm and sinus arrhythmia  Rate: normal  BPM: 73  QRS axis: normal              Assessment & Plan    1. Palpitations  - 37-year-old  female patient presenting today for consultation after wearing a event monitor for episodes of tachycardia.  While wearing the monitor was noted that she had one nonconducted P wave during sleep.  Because of this she was sent to EP for approval for beta-blocker treatment.  Intermittent AV block during sleep in a young healthy person is physiologic and likely associated with elevated vagal tone.  In patients with very high degrees of AV block during sleep (which she does not have), one also could consider sleep apnea as another cause that will increase vagal tone even further.  However, given this was a single event I am not sure further work-up is necessary but could be considered in the future.  She should likely be okay to trial beta-blocker therapy if this is indicated.  - Patient also asked about starting Ritalin, which is likely okay as well.  - She can follow-up with her usual cardiologist  - Follow-up as needed     Follow Up:  Return if symptoms worsen or fail to improve.    Scribed for George Gooden MD by Maribel CAMARA. 4/8/2022  15:32 EDT    I, George Gooden MD, personally performed the services described in this documentation as scribed by the above named individual in my presence, and it is both accurate and complete.   4/8/2022  15:32 EDT

## 2022-04-08 ENCOUNTER — OFFICE VISIT (OUTPATIENT)
Dept: CARDIOLOGY | Facility: CLINIC | Age: 37
End: 2022-04-08

## 2022-04-08 ENCOUNTER — TELEPHONE (OUTPATIENT)
Dept: PSYCHIATRY | Facility: CLINIC | Age: 37
End: 2022-04-08

## 2022-04-08 VITALS
BODY MASS INDEX: 26.68 KG/M2 | OXYGEN SATURATION: 99 % | DIASTOLIC BLOOD PRESSURE: 84 MMHG | WEIGHT: 166 LBS | HEIGHT: 66 IN | HEART RATE: 86 BPM | SYSTOLIC BLOOD PRESSURE: 122 MMHG

## 2022-04-08 DIAGNOSIS — R00.2 PALPITATIONS: Primary | ICD-10-CM

## 2022-04-08 PROCEDURE — 99203 OFFICE O/P NEW LOW 30 MIN: CPT | Performed by: STUDENT IN AN ORGANIZED HEALTH CARE EDUCATION/TRAINING PROGRAM

## 2022-04-08 PROCEDURE — 93000 ELECTROCARDIOGRAM COMPLETE: CPT | Performed by: STUDENT IN AN ORGANIZED HEALTH CARE EDUCATION/TRAINING PROGRAM

## 2022-04-08 NOTE — TELEPHONE ENCOUNTER
04/08/22 at 11:13am Called patient and discussed canceling Vyvanse and starting Ritalin 5mg twice daily for ADHD. Discussed with patient risks, benefits, and side effects of medication. Patient acknowledged and agreed.   Patient was provided education regarding treatment and treatment options.  Extensive education is provided regarding stimulants. Cardiac risk, risk of growth delay, weight loss, and cardiac issues. Patient is being prescribed a controlled substance as part of treatment plan.     Patient states she has an appointment with EP cardiology today at 1pm to discuss dropped heart beat on Holter monitor. Discussed with patient to discuss with provider starting Ritalin for ADHD. Discussed with patient after appointment notify our office regarding provider assessment and will send Ritalin in to pharmacy. Patient acknowledged and agreed.

## 2022-04-12 DIAGNOSIS — F39 UNSPECIFIED MOOD (AFFECTIVE) DISORDER: ICD-10-CM

## 2022-04-12 DIAGNOSIS — F90.2 ATTENTION DEFICIT HYPERACTIVITY DISORDER (ADHD), COMBINED TYPE: Primary | ICD-10-CM

## 2022-04-12 DIAGNOSIS — F41.9 ANXIETY DISORDER, UNSPECIFIED TYPE: ICD-10-CM

## 2022-04-12 RX ORDER — FLUOXETINE HYDROCHLORIDE 40 MG/1
40 CAPSULE ORAL DAILY
Qty: 90 CAPSULE | Refills: 1 | Status: SHIPPED | OUTPATIENT
Start: 2022-04-12 | End: 2022-04-29 | Stop reason: SDUPTHER

## 2022-04-12 RX ORDER — METOPROLOL SUCCINATE 25 MG/1
12.5 TABLET, EXTENDED RELEASE ORAL DAILY
Qty: 30 TABLET | Refills: 3 | Status: SHIPPED | OUTPATIENT
Start: 2022-04-12 | End: 2022-07-05

## 2022-04-12 RX ORDER — METHYLPHENIDATE HYDROCHLORIDE 5 MG/1
5 TABLET ORAL 2 TIMES DAILY
Qty: 60 TABLET | Refills: 0 | Status: SHIPPED | OUTPATIENT
Start: 2022-04-12 | End: 2022-04-29 | Stop reason: SDUPTHER

## 2022-04-12 NOTE — PROGRESS NOTES
Discussed recent visit with EP and mental health provider.  EP has recommended proceeding with beta-blocker therapy for better control heart rate. Mental health provider has recently started on Ritalin which may induce tachycardia discussed risk and benefits of beta-blocker with patient, she is willing to proceed. Start Toprol-XL 12.5 mg daily with close monitoring her blood pressure and heart rate.

## 2022-04-27 RX ORDER — SUMATRIPTAN 25 MG/1
25 TABLET, FILM COATED ORAL ONCE AS NEEDED
Qty: 9 TABLET | Refills: 1 | Status: SHIPPED | OUTPATIENT
Start: 2022-04-27 | End: 2022-07-05 | Stop reason: SDUPTHER

## 2022-04-29 ENCOUNTER — TELEMEDICINE (OUTPATIENT)
Dept: PSYCHIATRY | Facility: CLINIC | Age: 37
End: 2022-04-29

## 2022-04-29 DIAGNOSIS — F41.9 ANXIETY DISORDER, UNSPECIFIED TYPE: ICD-10-CM

## 2022-04-29 DIAGNOSIS — F90.2 ATTENTION DEFICIT HYPERACTIVITY DISORDER (ADHD), COMBINED TYPE: ICD-10-CM

## 2022-04-29 DIAGNOSIS — F39 UNSPECIFIED MOOD (AFFECTIVE) DISORDER: ICD-10-CM

## 2022-04-29 PROCEDURE — 99213 OFFICE O/P EST LOW 20 MIN: CPT | Performed by: NURSE PRACTITIONER

## 2022-04-29 RX ORDER — METHYLPHENIDATE HYDROCHLORIDE 10 MG/1
10 TABLET ORAL 2 TIMES DAILY
Qty: 60 TABLET | Refills: 0 | Status: SHIPPED | OUTPATIENT
Start: 2022-04-29 | End: 2022-06-03

## 2022-04-29 RX ORDER — FLUOXETINE HYDROCHLORIDE 40 MG/1
40 CAPSULE ORAL DAILY
Qty: 90 CAPSULE | Refills: 1
Start: 2022-04-29 | End: 2022-06-03 | Stop reason: SDUPTHER

## 2022-04-29 NOTE — PROGRESS NOTES
"      Subjective   Diallo Spence is a 37 y.o. female is here today for medication management follow-up.    This provider is located at [facility] at [address] [place] [address]. The Patient is seen remotely at home, using Minimally invasive devices marielle. Patient is being seen via telehealth and stated they are in a secure environment for this session. The patient's condition being diagnosed/treated is appropriate for telemedicine. The provider identified him/herself as well as his or her credentials. The patient and/or patients guardian consent to be seen remotely, and when consent is given they understand that the consent allows for patient identifiable information to be sent to a third party as needed.  They may refuse to be seen remotely at any time. The electronic data is encrypted and password protected, and the patient has been advised of the potential risks to privacy not withstanding such measures.      Chief Complaint:  ADHD anxiety     History of Present Illness:    Patient presents today for a follow up for medication management for ADHD and anxiety. Patient states its going ok and anxiety is still high. Patient states depression is a little better than what it was. Patient states taking the Methylphenidate twice a day. Denies any panic attacks. Patient denies any side effects to medication. Patient states still getting distracted at work easily and its been worse this past week. Patient states unable to keep on track and \"connect the dots\" at work. Patient states not sleeping and sleeping about the same amount of hours. Patient states still having nightmares. Patient states appetite is about the same. Patient reports her depression is at a 6-7 on a 0-10 scale with 10 being the worst. Patient reports anxiety is at an 8 on a 0-10 scale with 10 being the worst. Denies any thoughts to harm self or others. Patient denies any auditory or visual hallucinations. Denies any medical changes since last visit. "   The following portions of the patient's history were reviewed and updated as appropriate: allergies, current medications, past family history, past medical history, past social history, past surgical history and problem list.    Review of Systems   Constitutional: Negative.    Respiratory: Negative.    Cardiovascular: Negative.    Gastrointestinal: Negative.    Neurological: Negative.    Psychiatric/Behavioral: Positive for agitation, decreased concentration, dysphoric mood and sleep disturbance. The patient is nervous/anxious.        Objective   Physical Exam  Constitutional:       Appearance: Normal appearance. She is well-developed and well-groomed.   Neurological:      Mental Status: She is alert.   Psychiatric:         Attention and Perception: Attention and perception normal.         Mood and Affect: Affect normal. Mood is anxious.         Speech: Speech normal.         Behavior: Behavior normal. Behavior is cooperative.         Thought Content: Thought content normal.         Cognition and Memory: Cognition and memory normal.         Judgment: Judgment is impulsive.       There were no vitals taken for this visit. There is no height or weight on file to calculate BMI.  Unable to obtain vitals due to video visit.     Allergies   Allergen Reactions   • Latex        Medication List:   Current Outpatient Medications   Medication Sig Dispense Refill   • FLUoxetine (PROzac) 40 MG capsule Take 1 capsule by mouth Daily. 90 capsule 1   • methylphenidate (Ritalin) 10 MG tablet Take 1 tablet by mouth 2 (Two) Times a Day. Second dose no later than 2pm. 60 tablet 0   • Cholecalciferol (Vitamin D3) 25 MCG (1000 UT) capsule Take 1 capsule by mouth Daily. 60 capsule 0   • metoprolol succinate XL (TOPROL-XL) 25 MG 24 hr tablet Take 1/2 tablet by mouth Daily. 30 tablet 3   • pantoprazole (Protonix) 40 MG EC tablet Take 1 tablet by mouth Daily. 90 tablet 1   • SUMAtriptan (Imitrex) 25 MG tablet Take 1 tablet by mouth 1 Time  As Needed for Migraine. Take 1 tablet at onset of headache. May repeat dose 1 time in 2 hours if headache not relieved. 9 tablet 1     No current facility-administered medications for this visit.       Mental Status Exam:   Hygiene:   good  Cooperation:  Cooperative  Eye Contact:  Good  Psychomotor Behavior:  Appropriate  Affect:  Appropriate  Hopelessness: Denies  Speech:  Normal  Thought Process:  Goal directed and Linear  Thought Content:  Normal  Suicidal:  None  Homicidal:  None  Hallucinations:  None  Delusion:  None  Memory:  Intact  Orientation:  Person, Place, Time and Situation  Reliability:  fair  Insight:  Fair  Judgement:  Fair  Impulse Control:  Fair  Physical/Medical Issues:  No               Assessment/Plan   Diagnoses and all orders for this visit:    1. Anxiety disorder, unspecified type  -     FLUoxetine (PROzac) 40 MG capsule; Take 1 capsule by mouth Daily.  Dispense: 90 capsule; Refill: 1    2. Unspecified mood (affective) disorder (HCC)  -     FLUoxetine (PROzac) 40 MG capsule; Take 1 capsule by mouth Daily.  Dispense: 90 capsule; Refill: 1    3. Attention deficit hyperactivity disorder (ADHD), combined type  -     methylphenidate (Ritalin) 10 MG tablet; Take 1 tablet by mouth 2 (Two) Times a Day. Second dose no later than 2pm.  Dispense: 60 tablet; Refill: 0            Discussed medication options with patient. Increase Methylphenidate to 10mg twice daily for worsening ADHD. Cont. Prozac 40mg daily for depression and anxiety. Reviewed the risks, benefits, and side effects of the medications; patient acknowledged and verbally consented. Patient was provided education regarding treatment and treatment options.  Extensive education is provided regarding stimulants. Cardiac risk, risk of growth delay, weight loss, and cardiac issues. Patient is being prescribed a controlled substance as part of treatment plan. Will obtain UDS at next in office visit.   [facility] Patient Controlled Substance Report  (from 4/29/2021 to 4/29/2022)    Dispensed  Strength Quantity Days Supply Provider Pharmacy   04/12/2022 Methylphenidate Hydrochlo 5MG 60 each 30 CLOUD, [facility]...           Patient is agreeable to call the office with any questions, concerns, or worsening of symptoms. Patient is aware to call 911 or go to the nearest ER should begin having SI/HI.          Follow up in four weeks      Errors in dictation may reflect use of voice recognition software and not all errors in transcription may have been detected prior to signing.              This document has been electronically signed by JAX Avalos   April 29, 2022 13:01 EDT

## 2022-05-20 ENCOUNTER — TELEPHONE (OUTPATIENT)
Dept: CARDIOLOGY | Facility: CLINIC | Age: 37
End: 2022-05-20

## 2022-05-20 DIAGNOSIS — R10.84 GENERALIZED ABDOMINAL PAIN: Primary | ICD-10-CM

## 2022-05-20 NOTE — TELEPHONE ENCOUNTER
Pt is wondering if you would order a CBC.  Pt has been having stomach pain for last 2 weeks and is paranoid that her colitis is back.  Is this ok ?

## 2022-05-24 ENCOUNTER — LAB (OUTPATIENT)
Dept: LAB | Facility: HOSPITAL | Age: 37
End: 2022-05-24

## 2022-05-24 DIAGNOSIS — R10.84 GENERALIZED ABDOMINAL PAIN: ICD-10-CM

## 2022-05-24 LAB
ALBUMIN SERPL-MCNC: 4.56 G/DL (ref 3.5–5.2)
ALBUMIN/GLOB SERPL: 1.4 G/DL
ALP SERPL-CCNC: 70 U/L (ref 39–117)
ALT SERPL W P-5'-P-CCNC: 8 U/L (ref 1–33)
AMYLASE SERPL-CCNC: 71 U/L (ref 28–100)
ANION GAP SERPL CALCULATED.3IONS-SCNC: 10.7 MMOL/L (ref 5–15)
AST SERPL-CCNC: 16 U/L (ref 1–32)
BASOPHILS # BLD AUTO: 0.05 10*3/MM3 (ref 0–0.2)
BASOPHILS NFR BLD AUTO: 0.6 % (ref 0–1.5)
BILIRUB SERPL-MCNC: 0.7 MG/DL (ref 0–1.2)
BUN SERPL-MCNC: 10 MG/DL (ref 6–20)
BUN/CREAT SERPL: 13.5 (ref 7–25)
CALCIUM SPEC-SCNC: 9.6 MG/DL (ref 8.6–10.5)
CHLORIDE SERPL-SCNC: 103 MMOL/L (ref 98–107)
CO2 SERPL-SCNC: 21.3 MMOL/L (ref 22–29)
CREAT SERPL-MCNC: 0.74 MG/DL (ref 0.57–1)
DEPRECATED RDW RBC AUTO: 43.6 FL (ref 37–54)
EGFRCR SERPLBLD CKD-EPI 2021: 108.4 ML/MIN/1.73
EOSINOPHIL # BLD AUTO: 0.08 10*3/MM3 (ref 0–0.4)
EOSINOPHIL NFR BLD AUTO: 1 % (ref 0.3–6.2)
ERYTHROCYTE [DISTWIDTH] IN BLOOD BY AUTOMATED COUNT: 13.2 % (ref 12.3–15.4)
GLOBULIN UR ELPH-MCNC: 3.2 GM/DL
GLUCOSE SERPL-MCNC: 91 MG/DL (ref 65–99)
HCT VFR BLD AUTO: 39.7 % (ref 34–46.6)
HGB BLD-MCNC: 13.3 G/DL (ref 12–15.9)
IMM GRANULOCYTES # BLD AUTO: 0.03 10*3/MM3 (ref 0–0.05)
IMM GRANULOCYTES NFR BLD AUTO: 0.4 % (ref 0–0.5)
LIPASE SERPL-CCNC: 42 U/L (ref 13–60)
LYMPHOCYTES # BLD AUTO: 2.26 10*3/MM3 (ref 0.7–3.1)
LYMPHOCYTES NFR BLD AUTO: 27.5 % (ref 19.6–45.3)
MCH RBC QN AUTO: 29.9 PG (ref 26.6–33)
MCHC RBC AUTO-ENTMCNC: 33.5 G/DL (ref 31.5–35.7)
MCV RBC AUTO: 89.2 FL (ref 79–97)
MONOCYTES # BLD AUTO: 0.45 10*3/MM3 (ref 0.1–0.9)
MONOCYTES NFR BLD AUTO: 5.5 % (ref 5–12)
NEUTROPHILS NFR BLD AUTO: 5.36 10*3/MM3 (ref 1.7–7)
NEUTROPHILS NFR BLD AUTO: 65 % (ref 42.7–76)
NRBC BLD AUTO-RTO: 0 /100 WBC (ref 0–0.2)
PLATELET # BLD AUTO: 310 10*3/MM3 (ref 140–450)
PMV BLD AUTO: 9.4 FL (ref 6–12)
POTASSIUM SERPL-SCNC: 4.5 MMOL/L (ref 3.5–5.2)
PROT SERPL-MCNC: 7.8 G/DL (ref 6–8.5)
RBC # BLD AUTO: 4.45 10*6/MM3 (ref 3.77–5.28)
SODIUM SERPL-SCNC: 135 MMOL/L (ref 136–145)
WBC NRBC COR # BLD: 8.23 10*3/MM3 (ref 3.4–10.8)

## 2022-05-24 PROCEDURE — 80053 COMPREHEN METABOLIC PANEL: CPT

## 2022-05-24 PROCEDURE — 83690 ASSAY OF LIPASE: CPT

## 2022-05-24 PROCEDURE — 82150 ASSAY OF AMYLASE: CPT

## 2022-05-24 PROCEDURE — 36415 COLL VENOUS BLD VENIPUNCTURE: CPT

## 2022-05-24 PROCEDURE — 85025 COMPLETE CBC W/AUTO DIFF WBC: CPT

## 2022-06-03 ENCOUNTER — OFFICE VISIT (OUTPATIENT)
Dept: PSYCHIATRY | Facility: CLINIC | Age: 37
End: 2022-06-03

## 2022-06-03 DIAGNOSIS — F39 UNSPECIFIED MOOD (AFFECTIVE) DISORDER: ICD-10-CM

## 2022-06-03 DIAGNOSIS — F90.2 ATTENTION DEFICIT HYPERACTIVITY DISORDER (ADHD), COMBINED TYPE: ICD-10-CM

## 2022-06-03 DIAGNOSIS — F41.9 ANXIETY DISORDER, UNSPECIFIED TYPE: ICD-10-CM

## 2022-06-03 PROCEDURE — 99213 OFFICE O/P EST LOW 20 MIN: CPT | Performed by: NURSE PRACTITIONER

## 2022-06-03 RX ORDER — METHYLPHENIDATE HYDROCHLORIDE 18 MG/1
18 TABLET ORAL DAILY
Qty: 30 TABLET | Refills: 0 | Status: ON HOLD | OUTPATIENT
Start: 2022-06-03 | End: 2022-07-01

## 2022-06-03 RX ORDER — FLUOXETINE HYDROCHLORIDE 20 MG/1
60 CAPSULE ORAL DAILY
Qty: 90 CAPSULE | Refills: 0 | Status: SHIPPED | OUTPATIENT
Start: 2022-06-03 | End: 2022-11-04

## 2022-06-03 NOTE — PROGRESS NOTES
Subjective   Diallo Spence is a 37 y.o. female is here today for medication management follow-up.    Chief Complaint:  ADHD anxiety     History of Present Illness:   Patient presents today for a follow up for medication management for ADHD and anxiety. Patient states the ritalin isn't working well and depression has been bad. Patient states had a couple good weeks then felt bad. Patient states doing things at home without paying attention. Patient states such as mopping floor without  due to forgetting  in water and spraying deodorant in hair. Patient states sleep is getting worse and sleeping more through the day. Patient reports still having same nightmares and having night sweats. Patient states having the night sweats for about two weeks. Patient states only sleeping about 4-5 hours a night but sometimes taking a nap during the day. Patient states appetite is about the same and still snacking a lot. Patient states struggling with what to do about work. Patient states having a lot of problems with building up anger. Denies any thoughts to harm self or others. Denies any auditory or visual hallucinations. Denies any medical changes since last visit. Denies any panic attacks.   The following portions of the patient's history were reviewed and updated as appropriate: allergies, current medications, past family history, past medical history, past social history, past surgical history and problem list.    Review of Systems   Constitutional: Negative.    Respiratory: Negative.    Cardiovascular: Negative.    Gastrointestinal: Negative.    Neurological: Negative.    Psychiatric/Behavioral: Positive for agitation, decreased concentration, dysphoric mood and sleep disturbance.       Objective   Physical Exam  Constitutional:       Appearance: Normal appearance. She is well-developed and well-groomed.   Neurological:      Mental Status: She is alert.   Psychiatric:         Attention and  Perception: Attention and perception normal.         Mood and Affect: Affect normal. Mood is anxious.         Speech: Speech normal.         Behavior: Behavior normal. Behavior is cooperative.         Thought Content: Thought content normal.         Cognition and Memory: Cognition and memory normal.         Judgment: Judgment is impulsive.       There were no vitals taken for this visit. There is no height or weight on file to calculate BMI.    Allergies   Allergen Reactions   • Latex        Medication List:   Current Outpatient Medications   Medication Sig Dispense Refill   • FLUoxetine (PROzac) 20 MG capsule Take 3 capsules by mouth Daily. 90 capsule 0   • Cholecalciferol (Vitamin D3) 25 MCG (1000 UT) capsule Take 1 capsule by mouth Daily. 60 capsule 0   • methylphenidate (Concerta) 18 MG CR tablet Take 1 tablet by mouth Daily 30 tablet 0   • metoprolol succinate XL (TOPROL-XL) 25 MG 24 hr tablet Take 1/2 tablet by mouth Daily. 30 tablet 3   • pantoprazole (Protonix) 40 MG EC tablet Take 1 tablet by mouth Daily. 90 tablet 1   • SUMAtriptan (Imitrex) 25 MG tablet Take 1 tablet by mouth 1 Time As Needed for Migraine. Take 1 tablet at onset of headache. May repeat dose 1 time in 2 hours if headache not relieved. 9 tablet 1     No current facility-administered medications for this visit.       Mental Status Exam:   Hygiene:   good  Cooperation:  Cooperative  Eye Contact:  Fair  Psychomotor Behavior:  Restless  Affect:  Appropriate  Hopelessness: Denies  Speech:  Rapid  Thought Process:  Goal directed and Linear  Thought Content:  Normal  Suicidal:  None  Homicidal:  None  Hallucinations:  None  Delusion:  None  Memory:  Intact  Orientation:  Person, Place, Time and Situation  Reliability:  fair  Insight:  Fair  Judgement:  Fair  Impulse Control:  Fair  Physical/Medical Issues:  No               Assessment & Plan   Diagnoses and all orders for this visit:    1. Attention deficit hyperactivity disorder (ADHD), combined  type  -     methylphenidate (Concerta) 18 MG CR tablet; Take 1 tablet by mouth Daily  Dispense: 30 tablet; Refill: 0    2. Anxiety disorder, unspecified type  -     FLUoxetine (PROzac) 20 MG capsule; Take 3 capsules by mouth Daily.  Dispense: 90 capsule; Refill: 0    3. Unspecified mood (affective) disorder (HCC)  -     FLUoxetine (PROzac) 20 MG capsule; Take 3 capsules by mouth Daily.  Dispense: 90 capsule; Refill: 0            Discussed medication options with patient. Discont. Ritalin. Start Concerta 18mg daily for worsening ADHD. Increase Prozac to 20mg three capsules daily for depression and anxiety. Reviewed the risks, benefits, and side effects of the medications; patient acknowledged and verbally consented. Patient was provided education regarding treatment and treatment options.  Extensive education is provided regarding stimulants. Cardiac risk, risk of growth delay, weight loss, and cardiac issues. Patient is being prescribed a controlled substance as part of treatment plan.   [facility] Patient Controlled Substance Report (from 6/17/2021 to 6/3/2022)    Dispensed  Strength Quantity Days Supply Provider Pharmacy   04/29/2022 Methylphenidate Hydrochlo 10MG 60 each 30 [facility]OU[facility], [facility]...   04/12/2022 Methylphenidate Hydrochlo 5MG 60 each 30 [facility], [facility]...           Patient is agreeable to call the office with any questions, concerns, or worsening of symptoms. Patient is aware to call 911 or go to the nearest ER should begin having SI/HI.          Follow up in four weeks        Errors in dictation may reflect use of voice recognition software and not all errors in transcription may have been detected prior to signing.              This document has been electronically signed by JAX Avalos   June 17, 2022 08:47 EDT

## 2022-06-15 PROBLEM — K76.0 FATTY LIVER: Status: ACTIVE | Noted: 2022-06-15

## 2022-06-15 PROBLEM — D18.03 LIVER HEMANGIOMA: Status: ACTIVE | Noted: 2022-06-15

## 2022-06-15 PROBLEM — K63.5 COLON POLYP: Status: ACTIVE | Noted: 2022-06-15

## 2022-06-15 PROBLEM — K44.9 HIATAL HERNIA: Status: ACTIVE | Noted: 2022-06-15

## 2022-06-15 PROBLEM — F90.9 ADHD: Status: ACTIVE | Noted: 2022-06-15

## 2022-06-28 ENCOUNTER — TELEPHONE (OUTPATIENT)
Dept: CARDIOLOGY | Facility: CLINIC | Age: 37
End: 2022-06-28

## 2022-06-28 RX ORDER — METHYLPREDNISOLONE 4 MG/1
TABLET ORAL
Qty: 21 TABLET | Refills: 0 | Status: ON HOLD | OUTPATIENT
Start: 2022-06-28 | End: 2022-07-01

## 2022-06-28 RX ORDER — SULFAMETHOXAZOLE AND TRIMETHOPRIM 800; 160 MG/1; MG/1
1 TABLET ORAL 2 TIMES DAILY
Qty: 20 TABLET | Refills: 0 | Status: SHIPPED | OUTPATIENT
Start: 2022-06-28 | End: 2022-07-05

## 2022-06-28 RX ORDER — FLUCONAZOLE 150 MG/1
150 TABLET ORAL ONCE
Qty: 2 TABLET | Refills: 0 | Status: SHIPPED | OUTPATIENT
Start: 2022-06-28 | End: 2022-06-30

## 2022-06-28 NOTE — TELEPHONE ENCOUNTER
She reports she was stung by wasp on Saturday.  She denies angioedema or shortness of air.  She has had some localized redness that is worsening and diffuse swelling. She has not been taking antihistamines.  She reports area has been very pruritic, she denies fever.  She has a very intense area of erythema with an extensive area extending on this area of erythema that is light pink in color, diffuse swelling noted  on her left posterior upper leg.  Rx for Bactrim and a Medrol Dosepak will be sent to the pharmacy.  She will monitor the area and advised if new or  worsening symptoms she would need further medical evaluation.  She should also use Benadryl, to help with symptoms. She expresses understanding.

## 2022-06-30 ENCOUNTER — DOCUMENTATION (OUTPATIENT)
Dept: CARDIOLOGY | Facility: CLINIC | Age: 37
End: 2022-06-30

## 2022-06-30 ENCOUNTER — HOSPITAL ENCOUNTER (INPATIENT)
Facility: HOSPITAL | Age: 37
LOS: 2 days | Discharge: HOME | End: 2022-07-02
Attending: EMERGENCY MEDICINE | Admitting: INTERNAL MEDICINE

## 2022-06-30 ENCOUNTER — APPOINTMENT (OUTPATIENT)
Dept: CT IMAGING | Facility: HOSPITAL | Age: 37
End: 2022-06-30

## 2022-06-30 ENCOUNTER — APPOINTMENT (OUTPATIENT)
Dept: GENERAL RADIOLOGY | Facility: HOSPITAL | Age: 37
End: 2022-06-30

## 2022-06-30 DIAGNOSIS — G43.419 INTRACTABLE HEMIPLEGIC MIGRAINE WITHOUT STATUS MIGRAINOSUS: ICD-10-CM

## 2022-06-30 DIAGNOSIS — R47.01 EXPRESSIVE APHASIA: ICD-10-CM

## 2022-06-30 DIAGNOSIS — R47.89 FLUENCY DISORDER: ICD-10-CM

## 2022-06-30 DIAGNOSIS — R53.1 ACUTE RIGHT-SIDED WEAKNESS: Primary | ICD-10-CM

## 2022-06-30 LAB
ALT SERPL W P-5'-P-CCNC: 11 U/L (ref 1–33)
AMPHET+METHAMPHET UR QL: NEGATIVE
AMPHETAMINES UR QL: NEGATIVE
APTT PPP: 31.9 SECONDS (ref 22–39)
AST SERPL-CCNC: 17 U/L (ref 1–32)
B-HCG UR QL: NEGATIVE
BARBITURATES UR QL SCN: NEGATIVE
BASOPHILS # BLD AUTO: 0.06 10*3/MM3 (ref 0–0.2)
BASOPHILS NFR BLD AUTO: 0.6 % (ref 0–1.5)
BENZODIAZ UR QL SCN: NEGATIVE
BUN BLDA-MCNC: 13 MG/DL (ref 8–26)
BUPRENORPHINE SERPL-MCNC: NEGATIVE NG/ML
CA-I BLDA-SCNC: 1.23 MMOL/L (ref 1.2–1.32)
CANNABINOIDS SERPL QL: NEGATIVE
CHLORIDE BLDA-SCNC: 107 MMOL/L (ref 98–109)
CO2 BLDA-SCNC: 18 MMOL/L (ref 24–29)
COCAINE UR QL: NEGATIVE
CREAT BLDA-MCNC: 0.9 MG/DL (ref 0.6–1.3)
DEPRECATED RDW RBC AUTO: 43.3 FL (ref 37–54)
EGFRCR SERPLBLD CKD-EPI 2021: 85.7 ML/MIN/1.73
EOSINOPHIL # BLD AUTO: 0.04 10*3/MM3 (ref 0–0.4)
EOSINOPHIL NFR BLD AUTO: 0.4 % (ref 0.3–6.2)
ERYTHROCYTE [DISTWIDTH] IN BLOOD BY AUTOMATED COUNT: 13.2 % (ref 12.3–15.4)
FLUAV SUBTYP SPEC NAA+PROBE: NOT DETECTED
FLUBV RNA ISLT QL NAA+PROBE: NOT DETECTED
GLUCOSE BLDC GLUCOMTR-MCNC: 102 MG/DL (ref 70–130)
GLUCOSE BLDC GLUCOMTR-MCNC: 106 MG/DL (ref 70–130)
HBA1C MFR BLD: 4.8 % (ref 4.8–5.6)
HCT VFR BLD AUTO: 37 % (ref 34–46.6)
HCT VFR BLDA CALC: 40 % (ref 38–51)
HGB BLD-MCNC: 12.7 G/DL (ref 12–15.9)
HGB BLDA-MCNC: 13.6 G/DL (ref 12–17)
HOLD SPECIMEN: NORMAL
IMM GRANULOCYTES # BLD AUTO: 0.03 10*3/MM3 (ref 0–0.05)
IMM GRANULOCYTES NFR BLD AUTO: 0.3 % (ref 0–0.5)
LYMPHOCYTES # BLD AUTO: 1.92 10*3/MM3 (ref 0.7–3.1)
LYMPHOCYTES NFR BLD AUTO: 18.2 % (ref 19.6–45.3)
MCH RBC QN AUTO: 30.4 PG (ref 26.6–33)
MCHC RBC AUTO-ENTMCNC: 34.3 G/DL (ref 31.5–35.7)
MCV RBC AUTO: 88.5 FL (ref 79–97)
METHADONE UR QL SCN: NEGATIVE
MONOCYTES # BLD AUTO: 0.52 10*3/MM3 (ref 0.1–0.9)
MONOCYTES NFR BLD AUTO: 4.9 % (ref 5–12)
NEUTROPHILS NFR BLD AUTO: 7.97 10*3/MM3 (ref 1.7–7)
NEUTROPHILS NFR BLD AUTO: 75.6 % (ref 42.7–76)
NRBC BLD AUTO-RTO: 0 /100 WBC (ref 0–0.2)
OPIATES UR QL: NEGATIVE
OXYCODONE UR QL SCN: NEGATIVE
PCP UR QL SCN: NEGATIVE
PLATELET # BLD AUTO: 329 10*3/MM3 (ref 140–450)
PMV BLD AUTO: 9.6 FL (ref 6–12)
POTASSIUM BLDA-SCNC: 3.1 MMOL/L (ref 3.5–4.9)
PROPOXYPH UR QL: NEGATIVE
RBC # BLD AUTO: 4.18 10*6/MM3 (ref 3.77–5.28)
SARS-COV-2 RNA PNL SPEC NAA+PROBE: NOT DETECTED
SODIUM BLD-SCNC: 139 MMOL/L (ref 138–146)
TRICYCLICS UR QL SCN: NEGATIVE
TROPONIN T SERPL-MCNC: <0.01 NG/ML (ref 0–0.03)
TSH SERPL DL<=0.05 MIU/L-ACNC: 3.58 UIU/ML (ref 0.27–4.2)
WBC NRBC COR # BLD: 10.54 10*3/MM3 (ref 3.4–10.8)
WHOLE BLOOD HOLD COAG: NORMAL
WHOLE BLOOD HOLD SPECIMEN: NORMAL

## 2022-06-30 PROCEDURE — 85610 PROTHROMBIN TIME: CPT

## 2022-06-30 PROCEDURE — 82962 GLUCOSE BLOOD TEST: CPT

## 2022-06-30 PROCEDURE — 83036 HEMOGLOBIN GLYCOSYLATED A1C: CPT | Performed by: NURSE PRACTITIONER

## 2022-06-30 PROCEDURE — 99223 1ST HOSP IP/OBS HIGH 75: CPT | Performed by: STUDENT IN AN ORGANIZED HEALTH CARE EDUCATION/TRAINING PROGRAM

## 2022-06-30 PROCEDURE — 99222 1ST HOSP IP/OBS MODERATE 55: CPT | Performed by: INTERNAL MEDICINE

## 2022-06-30 PROCEDURE — 80047 BASIC METABLC PNL IONIZED CA: CPT

## 2022-06-30 PROCEDURE — 80306 DRUG TEST PRSMV INSTRMNT: CPT | Performed by: INTERNAL MEDICINE

## 2022-06-30 PROCEDURE — 92610 EVALUATE SWALLOWING FUNCTION: CPT

## 2022-06-30 PROCEDURE — 25010000002 TENECTEPLASE PER 50 MG: Performed by: EMERGENCY MEDICINE

## 2022-06-30 PROCEDURE — 84443 ASSAY THYROID STIM HORMONE: CPT | Performed by: NURSE PRACTITIONER

## 2022-06-30 PROCEDURE — 70496 CT ANGIOGRAPHY HEAD: CPT

## 2022-06-30 PROCEDURE — 3E03317 INTRODUCTION OF OTHER THROMBOLYTIC INTO PERIPHERAL VEIN, PERCUTANEOUS APPROACH: ICD-10-PCS | Performed by: INTERNAL MEDICINE

## 2022-06-30 PROCEDURE — 99285 EMERGENCY DEPT VISIT HI MDM: CPT

## 2022-06-30 PROCEDURE — 0042T HC CT CEREBRAL PERFUSION W/WO CONTRAST: CPT

## 2022-06-30 PROCEDURE — 85014 HEMATOCRIT: CPT

## 2022-06-30 PROCEDURE — 71045 X-RAY EXAM CHEST 1 VIEW: CPT

## 2022-06-30 PROCEDURE — 85025 COMPLETE CBC W/AUTO DIFF WBC: CPT | Performed by: EMERGENCY MEDICINE

## 2022-06-30 PROCEDURE — 70498 CT ANGIOGRAPHY NECK: CPT

## 2022-06-30 PROCEDURE — 0 IOPAMIDOL PER 1 ML: Performed by: EMERGENCY MEDICINE

## 2022-06-30 PROCEDURE — 70450 CT HEAD/BRAIN W/O DYE: CPT

## 2022-06-30 PROCEDURE — 84460 ALANINE AMINO (ALT) (SGPT): CPT | Performed by: EMERGENCY MEDICINE

## 2022-06-30 PROCEDURE — 84484 ASSAY OF TROPONIN QUANT: CPT | Performed by: EMERGENCY MEDICINE

## 2022-06-30 PROCEDURE — 87636 SARSCOV2 & INF A&B AMP PRB: CPT | Performed by: EMERGENCY MEDICINE

## 2022-06-30 PROCEDURE — 85730 THROMBOPLASTIN TIME PARTIAL: CPT | Performed by: EMERGENCY MEDICINE

## 2022-06-30 PROCEDURE — 84450 TRANSFERASE (AST) (SGOT): CPT | Performed by: EMERGENCY MEDICINE

## 2022-06-30 PROCEDURE — 93005 ELECTROCARDIOGRAM TRACING: CPT | Performed by: EMERGENCY MEDICINE

## 2022-06-30 PROCEDURE — 81025 URINE PREGNANCY TEST: CPT | Performed by: INTERNAL MEDICINE

## 2022-06-30 RX ORDER — SODIUM CHLORIDE 0.9 % (FLUSH) 0.9 %
10 SYRINGE (ML) INJECTION AS NEEDED
Status: DISCONTINUED | OUTPATIENT
Start: 2022-06-30 | End: 2022-07-02 | Stop reason: HOSPADM

## 2022-06-30 RX ORDER — ASPIRIN 325 MG
325 TABLET ORAL DAILY
Status: DISCONTINUED | OUTPATIENT
Start: 2022-07-01 | End: 2022-07-01

## 2022-06-30 RX ORDER — DEXTROSE MONOHYDRATE 25 G/50ML
25 INJECTION, SOLUTION INTRAVENOUS
Status: DISCONTINUED | OUTPATIENT
Start: 2022-06-30 | End: 2022-07-02 | Stop reason: HOSPADM

## 2022-06-30 RX ORDER — SODIUM CHLORIDE 0.9 % (FLUSH) 0.9 %
10 SYRINGE (ML) INJECTION ONCE
Status: COMPLETED | OUTPATIENT
Start: 2022-06-30 | End: 2022-06-30

## 2022-06-30 RX ORDER — ACETAMINOPHEN 325 MG/1
650 TABLET ORAL EVERY 6 HOURS PRN
Status: DISCONTINUED | OUTPATIENT
Start: 2022-06-30 | End: 2022-06-30 | Stop reason: SDUPTHER

## 2022-06-30 RX ORDER — ACETAMINOPHEN 325 MG/1
650 TABLET ORAL EVERY 6 HOURS PRN
Status: DISCONTINUED | OUTPATIENT
Start: 2022-06-30 | End: 2022-07-02 | Stop reason: HOSPADM

## 2022-06-30 RX ORDER — POTASSIUM CHLORIDE 1.5 G/1.77G
40 POWDER, FOR SOLUTION ORAL AS NEEDED
Status: DISCONTINUED | OUTPATIENT
Start: 2022-06-30 | End: 2022-07-02 | Stop reason: HOSPADM

## 2022-06-30 RX ORDER — ATORVASTATIN CALCIUM 40 MG/1
80 TABLET, FILM COATED ORAL NIGHTLY
Status: DISCONTINUED | OUTPATIENT
Start: 2022-06-30 | End: 2022-07-01

## 2022-06-30 RX ORDER — NICOTINE POLACRILEX 4 MG
15 LOZENGE BUCCAL
Status: DISCONTINUED | OUTPATIENT
Start: 2022-06-30 | End: 2022-07-02 | Stop reason: HOSPADM

## 2022-06-30 RX ORDER — INSULIN LISPRO 100 [IU]/ML
0-9 INJECTION, SOLUTION INTRAVENOUS; SUBCUTANEOUS
Status: DISCONTINUED | OUTPATIENT
Start: 2022-07-01 | End: 2022-07-02 | Stop reason: HOSPADM

## 2022-06-30 RX ORDER — ASPIRIN 300 MG/1
300 SUPPOSITORY RECTAL DAILY
Status: DISCONTINUED | OUTPATIENT
Start: 2022-07-01 | End: 2022-07-01

## 2022-06-30 RX ORDER — POTASSIUM CHLORIDE 750 MG/1
40 CAPSULE, EXTENDED RELEASE ORAL AS NEEDED
Status: DISCONTINUED | OUTPATIENT
Start: 2022-06-30 | End: 2022-07-02 | Stop reason: HOSPADM

## 2022-06-30 RX ORDER — ONDANSETRON 2 MG/ML
4 INJECTION INTRAMUSCULAR; INTRAVENOUS EVERY 6 HOURS PRN
Status: DISCONTINUED | OUTPATIENT
Start: 2022-06-30 | End: 2022-07-02 | Stop reason: HOSPADM

## 2022-06-30 RX ORDER — PANTOPRAZOLE SODIUM 40 MG/10ML
40 INJECTION, POWDER, LYOPHILIZED, FOR SOLUTION INTRAVENOUS
Status: DISCONTINUED | OUTPATIENT
Start: 2022-07-01 | End: 2022-07-02 | Stop reason: HOSPADM

## 2022-06-30 RX ORDER — SODIUM CHLORIDE 0.9 % (FLUSH) 0.9 %
10 SYRINGE (ML) INJECTION EVERY 12 HOURS SCHEDULED
Status: DISCONTINUED | OUTPATIENT
Start: 2022-06-30 | End: 2022-07-02 | Stop reason: HOSPADM

## 2022-06-30 RX ADMIN — ATORVASTATIN CALCIUM 80 MG: 40 TABLET, FILM COATED ORAL at 22:48

## 2022-06-30 RX ADMIN — POTASSIUM CHLORIDE 40 MEQ: 1.5 POWDER, FOR SOLUTION ORAL at 22:52

## 2022-06-30 RX ADMIN — Medication 10 ML: at 13:05

## 2022-06-30 RX ADMIN — ACETAMINOPHEN 650 MG: 325 TABLET ORAL at 18:44

## 2022-06-30 RX ADMIN — IOPAMIDOL 115 ML: 755 INJECTION, SOLUTION INTRAVENOUS at 13:16

## 2022-06-30 RX ADMIN — TENECTEPLASE 19 MG: KIT at 13:05

## 2022-06-30 RX ADMIN — POTASSIUM CHLORIDE 40 MEQ: 1.5 POWDER, FOR SOLUTION ORAL at 18:44

## 2022-06-30 RX ADMIN — Medication 10 ML: at 22:48

## 2022-06-30 NOTE — PROGRESS NOTES
Wayne was rooming a patient today when she reports she started feeling weird. She came to my exam room and asked that I come examine her. She reports that she didn't feel good and felt as if she was going to pass out. She was sitting in a chair with her feet elevated. Her heart rate on pulse oximetry was reading around 120-130 bpm with an oxygen saturation of 98%, /82 manual on her RUE. She denied headache, loss of vision, chest pain, dyspnea. She was shaky and felt her heart racing. She did not experience loss of consciousness or bladder function. She transferred with assistance to an exam table where she was monitored and in supine position. 911 was contacted and she was given 2 chewable 81 mg of ASA at 1108 am. Her shakiness was persistent and she began to experience body heaviness. An EKG was performed with a normal rate and no acute changes. A neurological exam was performed, she did have limited ROM of R upper and R lower extremities. She had difficulties raising her R arm and R leg. She began to experience R sided facial numbness and could not close both eyes simultaneously. Her  strength was weaker on her R side as compared bilaterally. She was able to smile without asymmetry noted. She was able to stick her tongue out however could not fully. Extraocular ophthalmic movement was intact. Patient was stable and alert when emergent personal arrived.

## 2022-07-01 ENCOUNTER — APPOINTMENT (OUTPATIENT)
Dept: CARDIOLOGY | Facility: HOSPITAL | Age: 37
End: 2022-07-01

## 2022-07-01 ENCOUNTER — APPOINTMENT (OUTPATIENT)
Dept: MRI IMAGING | Facility: HOSPITAL | Age: 37
End: 2022-07-01

## 2022-07-01 ENCOUNTER — APPOINTMENT (OUTPATIENT)
Dept: CT IMAGING | Facility: HOSPITAL | Age: 37
End: 2022-07-01

## 2022-07-01 PROBLEM — F44.4 FUNCTIONAL NEUROLOGICAL SYMPTOM DISORDER WITH WEAKNESS OR PARALYSIS: Status: ACTIVE | Noted: 2022-07-01

## 2022-07-01 LAB
ANION GAP SERPL CALCULATED.3IONS-SCNC: 11 MMOL/L (ref 5–15)
BH CV ECHO MEAS - AO MAX PG: 5 MMHG
BH CV ECHO MEAS - AO MEAN PG: 3 MMHG
BH CV ECHO MEAS - AO ROOT DIAM: 2.8 CM
BH CV ECHO MEAS - AO V2 MAX: 112 CM/SEC
BH CV ECHO MEAS - AO V2 VTI: 23 CM
BH CV ECHO MEAS - AVA(I,D): 2.8 CM2
BH CV ECHO MEAS - EDV(CUBED): 91.1 ML
BH CV ECHO MEAS - EDV(MOD-SP2): 72.7 ML
BH CV ECHO MEAS - EDV(MOD-SP4): 90.1 ML
BH CV ECHO MEAS - EF(MOD-BP): 63.1 %
BH CV ECHO MEAS - EF(MOD-SP2): 62.6 %
BH CV ECHO MEAS - EF(MOD-SP4): 64.4 %
BH CV ECHO MEAS - ESV(CUBED): 24.4 ML
BH CV ECHO MEAS - ESV(MOD-SP2): 27.2 ML
BH CV ECHO MEAS - ESV(MOD-SP4): 32.1 ML
BH CV ECHO MEAS - FS: 35.6 %
BH CV ECHO MEAS - IVS/LVPW: 0.86 CM
BH CV ECHO MEAS - IVSD: 0.6 CM
BH CV ECHO MEAS - LA DIMENSION: 2.2 CM
BH CV ECHO MEAS - LAT PEAK E' VEL: 17.1 CM/SEC
BH CV ECHO MEAS - LV MASS(C)D: 87.1 GRAMS
BH CV ECHO MEAS - LV MAX PG: 4 MMHG
BH CV ECHO MEAS - LV MEAN PG: 2 MMHG
BH CV ECHO MEAS - LV V1 MAX: 99.8 CM/SEC
BH CV ECHO MEAS - LV V1 VTI: 18.3 CM
BH CV ECHO MEAS - LVIDD: 4.5 CM
BH CV ECHO MEAS - LVIDS: 2.9 CM
BH CV ECHO MEAS - LVOT AREA: 3.5 CM2
BH CV ECHO MEAS - LVOT DIAM: 2.1 CM
BH CV ECHO MEAS - LVPWD: 0.7 CM
BH CV ECHO MEAS - MED PEAK E' VEL: 13.4 CM/SEC
BH CV ECHO MEAS - MV A MAX VEL: 41.9 CM/SEC
BH CV ECHO MEAS - MV DEC SLOPE: 209 CM/SEC2
BH CV ECHO MEAS - MV DEC TIME: 0.28 MSEC
BH CV ECHO MEAS - MV E MAX VEL: 62 CM/SEC
BH CV ECHO MEAS - MV E/A: 1.48
BH CV ECHO MEAS - MV MAX PG: 2.26 MMHG
BH CV ECHO MEAS - MV MEAN PG: 1 MMHG
BH CV ECHO MEAS - MV P1/2T: 108.6 MSEC
BH CV ECHO MEAS - MV V2 VTI: 28.2 CM
BH CV ECHO MEAS - MVA(P1/2T): 2.03 CM2
BH CV ECHO MEAS - MVA(VTI): 2.25 CM2
BH CV ECHO MEAS - PA ACC TIME: 0.14 SEC
BH CV ECHO MEAS - PA PR(ACCEL): 14.2 MMHG
BH CV ECHO MEAS - PA V2 MAX: 73.3 CM/SEC
BH CV ECHO MEAS - SV(LVOT): 63.4 ML
BH CV ECHO MEAS - SV(MOD-SP2): 45.5 ML
BH CV ECHO MEAS - SV(MOD-SP4): 58 ML
BH CV ECHO MEAS - TAPSE (>1.6): 2.29 CM
BH CV ECHO MEASUREMENTS AVERAGE E/E' RATIO: 4.07
BH CV VAS BP LEFT ARM: NORMAL MMHG
BH CV XLRA - RV BASE: 3.3 CM
BH CV XLRA - RV LENGTH: 5.7 CM
BH CV XLRA - RV MID: 2.4 CM
BH CV XLRA - TDI S': 12.6 CM/SEC
BH CV XLRA MEAS LEFT DIST CCA EDV: 27.5 CM/SEC
BH CV XLRA MEAS LEFT DIST CCA PSV: 90.4 CM/SEC
BH CV XLRA MEAS LEFT DIST ICA EDV: 53.4 CM/SEC
BH CV XLRA MEAS LEFT DIST ICA PSV: 101 CM/SEC
BH CV XLRA MEAS LEFT ICA/CCA RATIO: 1.19
BH CV XLRA MEAS LEFT MID CCA EDV: 33 CM/SEC
BH CV XLRA MEAS LEFT MID CCA PSV: 90.4 CM/SEC
BH CV XLRA MEAS LEFT MID ICA EDV: 50.3 CM/SEC
BH CV XLRA MEAS LEFT MID ICA PSV: 107 CM/SEC
BH CV XLRA MEAS LEFT PROX CCA EDV: 36.1 CM/SEC
BH CV XLRA MEAS LEFT PROX CCA PSV: 104.5 CM/SEC
BH CV XLRA MEAS LEFT PROX ECA PSV: 90.8 CM/SEC
BH CV XLRA MEAS LEFT PROX ICA EDV: 41.6 CM/SEC
BH CV XLRA MEAS LEFT PROX ICA PSV: 94.3 CM/SEC
BH CV XLRA MEAS LEFT PROX SCLA PSV: 162.6 CM/SEC
BH CV XLRA MEAS LEFT VERTEBRAL A PSV: 56 CM/SEC
BH CV XLRA MEAS RIGHT DIST CCA EDV: 23.9 CM/SEC
BH CV XLRA MEAS RIGHT DIST CCA PSV: 71.7 CM/SEC
BH CV XLRA MEAS RIGHT DIST ICA EDV: 19.6 CM/SEC
BH CV XLRA MEAS RIGHT DIST ICA PSV: 39.7 CM/SEC
BH CV XLRA MEAS RIGHT ICA/CCA RATIO: 1.23
BH CV XLRA MEAS RIGHT MID CCA EDV: 24.5 CM/SEC
BH CV XLRA MEAS RIGHT MID CCA PSV: 87.4 CM/SEC
BH CV XLRA MEAS RIGHT MID ICA EDV: 51.5 CM/SEC
BH CV XLRA MEAS RIGHT MID ICA PSV: 107 CM/SEC
BH CV XLRA MEAS RIGHT PROX CCA EDV: 25.9 CM/SEC
BH CV XLRA MEAS RIGHT PROX CCA PSV: 98.2 CM/SEC
BH CV XLRA MEAS RIGHT PROX ECA PSV: 79.9 CM/SEC
BH CV XLRA MEAS RIGHT PROX ICA EDV: 30.8 CM/SEC
BH CV XLRA MEAS RIGHT PROX ICA PSV: 77.3 CM/SEC
BH CV XLRA MEAS RIGHT PROX SCLA PSV: 132 CM/SEC
BH CV XLRA MEAS RIGHT VERTEBRAL A PSV: 58.9 CM/SEC
BUN SERPL-MCNC: 10 MG/DL (ref 6–20)
BUN/CREAT SERPL: 10.6 (ref 7–25)
CALCIUM SPEC-SCNC: 9.7 MG/DL (ref 8.6–10.5)
CHLORIDE SERPL-SCNC: 103 MMOL/L (ref 98–107)
CHOLEST SERPL-MCNC: 181 MG/DL (ref 0–200)
CO2 SERPL-SCNC: 20 MMOL/L (ref 22–29)
CREAT SERPL-MCNC: 0.94 MG/DL (ref 0.57–1)
DEPRECATED RDW RBC AUTO: 45.3 FL (ref 37–54)
EGFRCR SERPLBLD CKD-EPI 2021: 81.3 ML/MIN/1.73
ERYTHROCYTE [DISTWIDTH] IN BLOOD BY AUTOMATED COUNT: 13.3 % (ref 12.3–15.4)
GLUCOSE BLDC GLUCOMTR-MCNC: 121 MG/DL (ref 70–130)
GLUCOSE SERPL-MCNC: 90 MG/DL (ref 65–99)
HCT VFR BLD AUTO: 39.4 % (ref 34–46.6)
HDLC SERPL-MCNC: 53 MG/DL (ref 40–60)
HGB BLD-MCNC: 13.1 G/DL (ref 12–15.9)
LDLC SERPL CALC-MCNC: 115 MG/DL (ref 0–100)
LDLC/HDLC SERPL: 2.14 {RATIO}
LEFT ATRIUM VOLUME INDEX: 14.5 ML/M2
MAGNESIUM SERPL-MCNC: 2.4 MG/DL (ref 1.6–2.6)
MAXIMAL PREDICTED HEART RATE: 185 BPM
MAXIMAL PREDICTED HEART RATE: 185 BPM
MCH RBC QN AUTO: 30.4 PG (ref 26.6–33)
MCHC RBC AUTO-ENTMCNC: 33.2 G/DL (ref 31.5–35.7)
MCV RBC AUTO: 91.4 FL (ref 79–97)
PHOSPHATE SERPL-MCNC: 3.2 MG/DL (ref 2.5–4.5)
PLATELET # BLD AUTO: 311 10*3/MM3 (ref 140–450)
PMV BLD AUTO: 9.4 FL (ref 6–12)
POTASSIUM SERPL-SCNC: 4.8 MMOL/L (ref 3.5–5.2)
QT INTERVAL: 408 MS
QTC INTERVAL: 446 MS
RBC # BLD AUTO: 4.31 10*6/MM3 (ref 3.77–5.28)
RIGHT ARM BP: NORMAL MMHG
SODIUM SERPL-SCNC: 134 MMOL/L (ref 136–145)
STRESS TARGET HR: 157 BPM
STRESS TARGET HR: 157 BPM
TRIGL SERPL-MCNC: 72 MG/DL (ref 0–150)
VLDLC SERPL-MCNC: 13 MG/DL (ref 5–40)
WBC NRBC COR # BLD: 8.74 10*3/MM3 (ref 3.4–10.8)

## 2022-07-01 PROCEDURE — 93306 TTE W/DOPPLER COMPLETE: CPT | Performed by: INTERNAL MEDICINE

## 2022-07-01 PROCEDURE — 93306 TTE W/DOPPLER COMPLETE: CPT

## 2022-07-01 PROCEDURE — 82962 GLUCOSE BLOOD TEST: CPT

## 2022-07-01 PROCEDURE — 92526 ORAL FUNCTION THERAPY: CPT

## 2022-07-01 PROCEDURE — 97166 OT EVAL MOD COMPLEX 45 MIN: CPT

## 2022-07-01 PROCEDURE — 85027 COMPLETE CBC AUTOMATED: CPT | Performed by: NURSE PRACTITIONER

## 2022-07-01 PROCEDURE — 70450 CT HEAD/BRAIN W/O DYE: CPT

## 2022-07-01 PROCEDURE — 70551 MRI BRAIN STEM W/O DYE: CPT

## 2022-07-01 PROCEDURE — 99232 SBSQ HOSP IP/OBS MODERATE 35: CPT | Performed by: INTERNAL MEDICINE

## 2022-07-01 PROCEDURE — 97535 SELF CARE MNGMENT TRAINING: CPT

## 2022-07-01 PROCEDURE — 83735 ASSAY OF MAGNESIUM: CPT | Performed by: NURSE PRACTITIONER

## 2022-07-01 PROCEDURE — 84100 ASSAY OF PHOSPHORUS: CPT | Performed by: NURSE PRACTITIONER

## 2022-07-01 PROCEDURE — 93880 EXTRACRANIAL BILAT STUDY: CPT

## 2022-07-01 PROCEDURE — 93880 EXTRACRANIAL BILAT STUDY: CPT | Performed by: INTERNAL MEDICINE

## 2022-07-01 PROCEDURE — 97162 PT EVAL MOD COMPLEX 30 MIN: CPT

## 2022-07-01 PROCEDURE — 99233 SBSQ HOSP IP/OBS HIGH 50: CPT | Performed by: STUDENT IN AN ORGANIZED HEALTH CARE EDUCATION/TRAINING PROGRAM

## 2022-07-01 PROCEDURE — 80061 LIPID PANEL: CPT | Performed by: NURSE PRACTITIONER

## 2022-07-01 PROCEDURE — 92523 SPEECH SOUND LANG COMPREHEN: CPT

## 2022-07-01 PROCEDURE — 80048 BASIC METABOLIC PNL TOTAL CA: CPT | Performed by: NURSE PRACTITIONER

## 2022-07-01 RX ORDER — FLUOXETINE HYDROCHLORIDE 20 MG/1
60 CAPSULE ORAL DAILY
Status: DISCONTINUED | OUTPATIENT
Start: 2022-07-01 | End: 2022-07-02 | Stop reason: HOSPADM

## 2022-07-01 RX ORDER — ALPRAZOLAM 0.25 MG/1
0.25 TABLET ORAL ONCE AS NEEDED
Status: COMPLETED | OUTPATIENT
Start: 2022-07-01 | End: 2022-07-01

## 2022-07-01 RX ADMIN — ACETAMINOPHEN 650 MG: 325 TABLET ORAL at 06:13

## 2022-07-01 RX ADMIN — ACETAMINOPHEN 650 MG: 325 TABLET ORAL at 18:37

## 2022-07-01 RX ADMIN — Medication 10 ML: at 09:00

## 2022-07-01 RX ADMIN — METOPROLOL TARTRATE 12.5 MG: 25 TABLET, FILM COATED ORAL at 13:42

## 2022-07-01 RX ADMIN — PANTOPRAZOLE SODIUM 40 MG: 40 INJECTION, POWDER, FOR SOLUTION INTRAVENOUS at 06:13

## 2022-07-01 RX ADMIN — ALPRAZOLAM 0.25 MG: 0.25 TABLET ORAL at 00:29

## 2022-07-01 RX ADMIN — POTASSIUM CHLORIDE 40 MEQ: 1.5 POWDER, FOR SOLUTION ORAL at 03:18

## 2022-07-01 RX ADMIN — FLUOXETINE HYDROCHLORIDE 60 MG: 20 CAPSULE ORAL at 13:42

## 2022-07-01 RX ADMIN — Medication 10 ML: at 20:13

## 2022-07-01 NOTE — CASE MANAGEMENT/SOCIAL WORK
Discharge Planning Assessment  [facility]     Patient Name: Diallo Spence  MRN: [id number]  Today's Date: 7/1/2022    Admit Date: 6/30/2022     Discharge Needs Assessment     Row Name 07/01/22 1314       Living Environment    People in Home child(azucena), dependent    Name(s) of People in Home 10 year old daughter    Current Living Arrangements home  Lives in [place]    Primary Care Provided by self    Provides Primary Care For no one    Caregiving Concerns Pt does have a 10  year old daughter.    Family Caregiver if Needed parent(s)    Family Caregiver Names Mother at .    Quality of Family Relationships helpful;involved;supportive    Able to Return to Prior Arrangements yes       Resource/Environmental Concerns    Resource/Environmental Concerns none    Transportation Concerns none       Transition Planning    Patient/Family Anticipates Transition to home with family    Patient/Family Anticipated Services at Transition ;durable medical equipment;outpatient care    Transportation Anticipated family or friend will provide       Discharge Needs Assessment    Equipment Currently Used at Home none    Concerns to be Addressed discharge planning    Anticipated Changes Related to Illness other (see comments)  TBD    Equipment Needed After Discharge walker, rolling    Discharge Facility/Level of Care Needs outpatient therapy    Current Discharge Risk chronically ill               Discharge Plan     Row Name 07/01/22 2834       Plan    Plan Home with her mother    Patient/Family in Agreement with Plan yes    Plan Comments Spoke to pt at . Pt lives with her 10 year old daughter in [place]. Is ind and employed. Brought in for rule out stroke, which is negative. Pt c/o right side weakness and has a stutter. She walked 3 ft with PT recommending IPR. Discussed with pt that her insurance will likely deny acute rehab and pt declines any further SNF referrals. She states she may need a walker at d/c and  would use [facility] in [place] for outpatient PT if needed. Also discussed she could follow up with her PCP for order if needed as well. She plans to go home with her mother, who lives across the street from her. CM will cont to follow.    1510: Received message from SLP that pt could benefit from services, [facility] in [place] does not offer SLP, but [facility] does. Spoke to pt at  and she would rather go to [facility] for all services if ordered at d/c.CM will follow.     Final Discharge Disposition Code 01 - home or self-care              Continued Care and Services - Admitted Since 6/30/2022    Coordination has not been started for this encounter.     Selected Continued Care - Episodes Includes selections from active Coordinated Care Management episodes    Rising Risk Care Management Episode start date: 7/1/2022   There are no active outsourced providers for this episode.                  Demographic Summary     Row Name 07/01/22 1300       General Information    Referral Source admission list    Preferred Language English    General Information Comments PCP is Cinda Alvarado. No POA.       Contact Information    Permission Granted to Share Info With                Functional Status     Row Name 07/01/22 0158       Functional Status    Usual Activity Tolerance good    Current Activity Tolerance other (see comments)  See PT note       Functional Status, IADL    Medications independent    Meal Preparation independent    Housekeeping independent    Laundry independent    Shopping independent       Employment/    Employment Status employed full-time    Employment/ Comments Has [employer].               Psychosocial    No documentation.                Abuse/Neglect    No documentation.                Legal    No documentation.                Substance Abuse    No documentation.                Patient Forms    No documentation.                   Zamzam Keller RN

## 2022-07-01 NOTE — PROGRESS NOTES
Critical Care Note     LOS: 1 day   Patient Care Team:  Cinda Alvarado APRN as PCP - General (Cardiology)  Brandy Santos, ANJEL as Ambulatory  ([facility])    Chief Complaint/Reason for visit:    Chief Complaint   Patient presents with   • Stroke   Right-sided weakness  History of migraine headaches    Subjective     Patient is a 37 y.o. female presents with PMH migraines, GERD, hiatal hernia, anxiety, depression and ADHD (on Concerta) who presented to the ED today for stroke-like symptoms.  She was in her usual state of health this morning.  She did have a headache and took Excedrin.  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  She was given 162 mg ASA at the clinic.  EMS was called and then they called for the flight crew who gave her zofran for nausea en route.  In the ED, NIHSS was initially 8 and worsened to 10.  CTH was negative.  CTA H/N and CTP was unremarkable.  She was given TNK at 1305.   Per records, she is followed by JAX Avalos in psychiatry for anxiety, depression and ADHD.  Ritalin was changed to Concerta recently and Prozac increased.     Interval History:     She continues to have difficulty with speech and right leg weakness.  Numbness has improved.  She denies headache.    Review of Systems:    All systems were reviewed and negative except as noted in subjective.    Medical history, surgical history, social history, family history reviewed    Objective     Intake/Output:    Intake/Output Summary (Last 24 hours) at 7/1/2022 1158  Last data filed at 7/1/2022 0600  Gross per 24 hour   Intake 600 ml   Output 2100 ml   Net -1500 ml       Nutrition:  Diet Soft Texture; Whole Foods; Thin    Infusions:         Telemetry: Sinus rhythm             Vital Signs  Blood pressure 120/90, pulse 84, temperature 98.4 °F (36.9 °C), temperature source Oral, resp. rate 18, height  "162.6 cm (64.02\"), weight 74.8 kg (164 lb 14.5 oz), SpO2 98 %.    Physical Exam:  General Appearance:   Well developed young woman in no acute distress   Head:   Atraumatic   Eyes:          Conjunctiva pink, no jaundice   Ears:     Throat:  Oral mucosa moist   Neck:  Trachea midline, no carotid bruit   Back:      Lungs:    Symmetric chest expansion.  Breath sounds are bilateral without wheeze or rhonchi    Heart:   Regular rhythm, S1, S2 auscultated   Abdomen:    Nondistended, bowel sounds present, soft, nontender   Rectal:   Deferred   Extremities:  No pretibial edema or cyanosis   Pulses:  Palpable pedal pulses   Skin:  Warm and dry without rash   Lymph nodes:  No cervical adenopathy   Neurologic:  Face is symmetric, tongue is midline.  Speech is stuttering.  No motor drift upper extremities.  Today she is able to lift her right leg off of the bed but it drifts.  Left leg without drift.    Interval:  (AM shift change)  1a. Level of Consciousness: 0-->Alert, keenly responsive  1b. LOC Questions: 0-->Answers both questions correctly  1c. LOC Commands: 0-->Performs both tasks correctly  2. Best Gaze: 0-->Normal  3. Visual: 0-->No visual loss  4. Facial Palsy: 0-->Normal symmetrical movements  5a. Motor Arm, Left: 0-->No drift, limb holds 90 (or 45) degrees for full 10 secs  5b. Motor Arm, Right: 0-->No drift, limb holds 90 (or 45) degrees for full 10 secs  6a. Motor Leg, Left: 0-->No drift, leg holds 30 degree position for full 5 secs  6b. Motor Leg, Right: 2-->Some effort against gravity, leg falls to bed by 5 secs, but has some effort against gravity  7. Limb Ataxia: 0-->Absent  8. Sensory: 1-->Mild-to-moderate sensory loss, patient feels pinprick is less sharp or is dull on the affected side, or there is a loss of superficial pain with pinprick, but patient is aware of being touched  9. Best Language: 1-->Mild-to-moderate aphasia, some obvious loss of fluency or facility of comprehension, without significant " limitation on ideas expressed or form of expression. Reduction of speech and/or comprehension, however, makes conversation. . . (see row details) (Stuttering present)  10. Dysarthria: 0-->Normal  11. Extinction and Inattention (formerly Neglect): 0-->No abnormality    Total (NIH Stroke Scale): 4      Results Review:     I reviewed the patient's new clinical results.   Results from last 7 days   Lab Units 07/01/22  0631 06/30/22  1300 06/30/22  1255   SODIUM mmol/L 134*  --   --    POTASSIUM mmol/L 4.8  --   --    CHLORIDE mmol/L 103  --   --    CO2 mmol/L 20.0*  --   --    BUN mg/dL 10  --   --    CREATININE mg/dL 0.94  --  0.90   CALCIUM mg/dL 9.7  --   --    ALT (SGPT) U/L  --  11  --    AST (SGOT) U/L  --  17  --    GLUCOSE mg/dL 90  --   --      Results from last 7 days   Lab Units 07/01/22  0826 06/30/22  1300 06/30/22  1255   WBC 10*3/mm3 8.74 10.54  --    HEMOGLOBIN g/dL 13.1 12.7  --    HEMOGLOBIN, POC g/dL  --   --  13.6   HEMATOCRIT % 39.4 37.0  --    HEMATOCRIT POC %  --   --  40   PLATELETS 10*3/mm3 311 329  --          No results found for: BLOODCX  Lab Results   Component Value Date    URINECX <10,000 CFU/mL Normal Urogenital [place] 08/16/2016       I reviewed the patient's new imaging including images and reports.    Preliminary echocardiogram revealed an EF of 63%, no PFO, no valvular disease    FINDINGS:  There are no areas of restricted diffusion.   There is no evidence of  intracranial hemorrhage. There is no mass, mass effect, or  hydrocephalus. No abnormal extra-axial fluid collections are seen.    Major intracranial vascular flow voids are preserved. Sella and  suprasellar cistern are within normal limits.   Cranio-vertebral  junction is normal.        Globes and orbits are within normal limits.      Visualized paranasal sinuses and mastoid air cells are clear.     IMPRESSION:     1. No acute intracranial abnormality.     This report was finalized on 7/1/2022 8:14 AM by Miky Johnson.     CTA  of the head and neck, CT perfusion were without abnormalities      All medications reviewed.   aspirin, 325 mg, Oral, Daily   Or  aspirin, 300 mg, Rectal, Daily  atorvastatin, 80 mg, Oral, Nightly  insulin lispro, 0-9 Units, Subcutaneous, 4x Daily With Meals & Nightly  pantoprazole, 40 mg, Intravenous, Q AM  sodium chloride, 10 mL, Intravenous, Q12H          Assessment & Plan       Suspected cerebrovascular accident (CVA)    Acute right-sided weakness    Anxiety    Depression    Gastroesophageal reflux disease without esophagitis    Hiatal hernia    ADHD    Migraine headache    37-year-old woman with suspected stroke who received TNKase.  MRI reveals no stroke.  She has some persistent right lower extremity weakness.  He has no history of lumbar disc disease.  She does have a history of migraine headaches.  She also takes amphetamines for attention deficit hyperactivity disorder.  However her urine drug screen showed no amphetamines.  She also was not pregnant.  Physical therapy assessed this morning and noted that she ambulated 3 feet with a rolling walker and minimal assist of 2.  She demonstrated nonweightbearing on the right lower extremity secondary to pain.    PLAN:    Will discuss with neurology whether we should image her lumbar spine  With no stroke on her MRI I wonder if we should stop her aspirin and Lipitor but again will defer to neurology  CT of the head 24 hours after TNKase, 1 PM and then likely transfer to the floor  Continue PT, OT  She should likely never receive amphetamines again and stop Concerta  Restart her Prozac, metoprolol        Danya Rosa MD  07/01/22  11:58 EDT    Addendum discussed with neurology and they do not feel an MRI or CT of the spine would be helpful.    Time:20min

## 2022-07-01 NOTE — THERAPY EVALUATION
Patient Name: Diallo Spence  : 1985    MRN: [id number]                              Today's Date: 2022       Admit Date: 2022    Visit Dx:     ICD-10-CM ICD-9-CM   1. Acute right-sided weakness  R53.1 728.87   2. Expressive aphasia  R47.01 784.3   3. Oral phase dysphagia  R13.11 787.21     Patient Active Problem List   Diagnosis   • Anxiety   • Depression   • Palpitations   • Gastroesophageal reflux disease without esophagitis   • Liver hemangioma   • Fatty liver   • Hiatal hernia   • Colon polyp   • ADHD   • Migraine headache   • Suspected cerebrovascular accident (CVA)   • Acute right-sided weakness     Past Medical History:   Diagnosis Date   • ADHD (attention deficit hyperactivity disorder)    • Anxiety    • Colitis 2020    11/3/2020 had colonoscopy and recommend repeat in 5 years   • Depression    • Fatty liver    • GERD (gastroesophageal reflux disease)    • Hiatal hernia    • Liver hemangioma 06/15/2022   • Liver hemangioma    • Liver problem    • Migraine headache    • Panic disorder    • Peripheral neuropathy    • Psychiatric illness    • Self-injurious behavior      Past Surgical History:   Procedure Laterality Date   •  SECTION     • CHOLECYSTECTOMY     • COLONOSCOPY      / BEBETO Reynolds- Normal    • COLONOSCOPY N/A 2016    Procedure: COLONOSCOPY CPT CODE: 05815;  Surgeon: Sherry Muse DO;  Location: [facility] OR;  Service:    • ENDOSCOPY N/A 2016    Procedure: ESOPHAGOGASTRODUODENOSCOPY WITH BIOPSY CPT CODE: 98651;  Surgeon: Sherry Muse DO;  Location: [facility] OR;  Service:    • GALLBLADDER SURGERY     • NERVE SURGERY      Right arm    • SHOULDER SURGERY     • TONSILLECTOMY     • UPPER GASTROINTESTINAL ENDOSCOPY      Dr. Jeter/ Small hiatal hernia       General Information     Row Name 22 5460          Physical Therapy Time and Intention    Document Type evaluation  -AY     Mode of Treatment physical therapy  -AY     Row Name  07/01/22 1150          General Information    Patient Profile Reviewed yes  -AY     Prior Level of Function independent:;all household mobility;community mobility;gait;transfer;bed mobility;using stairs  -AY     Existing Precautions/Restrictions fall;other (see comments)  stuttering; RLE weakness  -AY     Barriers to Rehab medically complex  -AY     Row Name 07/01/22 1150          Living Environment    People in Home child(azucena), dependent  -AY     Row Name 07/01/22 1150          Home Main Entrance    Number of Stairs, Main Entrance five  -AY     Stair Railings, Main Entrance railing on right side (ascending)  -AY     Row Name 07/01/22 1150          Stairs Within Home, Primary    Number of Stairs, Within Home, Primary none  -AY     Row Name 07/01/22 1150          Safety Issues, Functional Mobility    Safety Issues Affecting Function (Mobility) insight into deficits/self-awareness  -AY     Impairments Affecting Function (Mobility) balance;endurance/activity tolerance;coordination;strength;motor planning;sensation/sensory awareness;pain  -AY           User Key  (r) = Recorded By, (t) = Taken By, (c) = Cosigned By    Initials Name Provider Type    AY Cadence Young, PT Physical Therapist               Mobility     Row Name 07/01/22 1154          Sit-Stand Transfer    Sit-Stand [place] (Transfers) minimum assist (75% patient effort);2 person assist;verbal cues  -AY     Assistive Device (Sit-Stand Transfers) walker, front-wheeled  -AY     Row Name 07/01/22 1154          Gait/Stairs (Locomotion)    [place] Level (Gait) minimum assist (75% patient effort);2 person assist;verbal cues  -AY     Assistive Device (Gait) walker, front-wheeled  -AY     Distance in Feet (Gait) 3  -AY     Deviations/Abnormal Patterns (Gait) radha decreased;gait speed decreased;bilateral deviations;stride length decreased;base of support, narrow  -AY     Bilateral Gait Deviations forward flexed posture;heel strike decreased  -AY     Right  Sided Gait Deviations decreased knee extension  -AY     Comment, (Gait/Stairs) pt amb 3ft from bed> chair demonstrating NWB on RLE due to pain with R knee extension. cueing required for sequencing, posture, walker positioning, and hand placement. Gait distance limited by fatigue.  -AY           User Key  (r) = Recorded By, (t) = Taken By, (c) = Cosigned By    Initials Name Provider Type    Cadence Singh PT Physical Therapist               Obj/Interventions     Row Name 07/01/22 1156          Range of Motion Comprehensive    General Range of Motion bilateral lower extremity ROM WFL  -AY     Row Name 07/01/22 1156          Strength Comprehensive (MMT)    General Manual Muscle Testing (MMT) Assessment lower extremity strength deficits identified  -AY     Comment, General Manual Muscle Testing (MMT) Assessment MMT inconsistent with functional performance. LLE grossly 4+/5. RLE grossly 3-/5. No DF noted with formal MMT, but active DF observed with ambulation.  -AY     Row Name 07/01/22 1156          Balance    Balance Assessment sitting static balance;sitting dynamic balance;standing static balance;standing dynamic balance;sit to stand dynamic balance  -AY     Static Sitting Balance contact guard  -AY     Dynamic Sitting Balance contact guard  -AY     Position, Sitting Balance unsupported;sitting edge of bed  -AY     Sit to Stand Dynamic Balance minimal assist  -AY     Static Standing Balance minimal assist  -AY     Dynamic Standing Balance minimal assist  -AY     Position/Device Used, Standing Balance supported;walker, rolling  -AY     Row Name 07/01/22 1156          Sensory Assessment (Somatosensory)    Sensory Assessment (Somatosensory) left-sided sensation intact;right LE  -AY     Right LE Sensory Assessment light touch awareness;impaired  -AY           User Key  (r) = Recorded By, (t) = Taken By, (c) = Cosigned By    Initials Name Provider Type    Cadence Singh PT Physical Therapist               Goals/Plan      Row Name 07/01/22 1152          Bed Mobility Goal 1 (PT)    Activity/Assistive Device (Bed Mobility Goal 1, PT) sit to supine/supine to sit  -AY     [facility] Level/Cues Needed (Bed Mobility Goal 1, PT) standby assist  -AY     Time Frame (Bed Mobility Goal 1, PT) long term goal (LTG);2 weeks  -AY     Progress/Outcomes (Bed Mobility Goal 1, PT) goal ongoing  -AY     Row Name 07/01/22 1152          Transfer Goal 1 (PT)    Activity/Assistive Device (Transfer Goal 1, PT) sit-to-stand/stand-to-sit  -AY     [facility] Level/Cues Needed (Transfer Goal 1, PT) supervision required  -AY     Time Frame (Transfer Goal 1, PT) long term goal (LTG);2 weeks  -AY     Progress/Outcome (Transfer Goal 1, PT) goal ongoing  -AY     Row Name 07/01/22 1152          Gait Training Goal 1 (PT)    Activity/Assistive Device (Gait Training Goal 1, PT) gait (walking locomotion);walker, rolling  -AY     [facility] Level (Gait Training Goal 1, PT) supervision required  -AY     Distance (Gait Training Goal 1, PT) 150  -AY     Time Frame (Gait Training Goal 1, PT) long term goal (LTG);2 weeks  -AY     Progress/Outcome (Gait Training Goal 1, PT) goal ongoing  -AY     [facility] Name 07/01/22 1152          Therapy Assessment/Plan (PT)    Planned Therapy Interventions (PT) balance training;bed mobility training;gait training;home exercise program;patient/family education;strengthening;transfer training  -AY           User Key  (r) = Recorded By, (t) = Taken By, (c) = Cosigned By    Initials Name Provider Type    AY Cadence Young, PT Physical Therapist               Clinical Impression     Row Name 07/01/22 1157          Pain    Pretreatment Pain Rating 0/10 - no pain  -AY     Posttreatment Pain Rating 0/10 - no pain  -AY     Additional Documentation Pain Scale: Numbers Pre/Post-Treatment (Group)  -AY     Row Name 07/01/22 1157          Plan of Care Review    Plan of Care Reviewed With patient;mother  -AY     Progress no change  -AY     Outcome  Evaluation PT initial eval completed. Pt limited by RLE weakness, impaired sensation, decreased functional endurance, and generalized weakness. Pt amb 3ft with RWx and min Ax2, demonstrating NWB on RLE due to pain. Rec continued skilled PT. d/c rec for IRF.  -AY     Row Name 07/01/22 1157          Therapy Assessment/Plan (PT)    Rehab Potential (PT) good, to achieve stated therapy goals  -AY     Criteria for Skilled Interventions Met (PT) yes;meets criteria;skilled treatment is necessary  -AY     Therapy Frequency (PT) daily  -AY     Row Name 07/01/22 1157          Vital Signs    Pre Systolic BP Rehab 110  -AY     Pre Treatment Diastolic BP 80  -AY     Post Systolic BP Rehab 133  -AY     Post Treatment Diastolic BP 88  -AY     Pretreatment Heart Rate (beats/min) 80  -AY     Posttreatment Heart Rate (beats/min) 70  -AY     Pre SpO2 (%) 96  -AY     O2 Delivery Pre Treatment room air  -AY     O2 Delivery Intra Treatment room air  -AY     Post SpO2 (%) 97  -AY     O2 Delivery Post Treatment room air  -AY     Pre Patient Position Sitting  -AY     Intra Patient Position Standing  -AY     Post Patient Position Sitting  -AY     Row Name 07/01/22 1157          Positioning and Restraints    Pre-Treatment Position in bed  -AY     Post Treatment Position chair  -AY     In Chair notified nsg;reclined;sitting;call light within reach;encouraged to call for assist;exit alarm on;waffle cushion;legs elevated;with family/caregiver;RUE elevated;LUE elevated  -AY           User Key  (r) = Recorded By, (t) = Taken By, (c) = Cosigned By    Initials Name Provider Type    AY Cadence Young, PT Physical Therapist               Outcome Measures     Row Name 07/01/22 1152          How much help from another person do you currently need...    Turning from your back to your side while in flat bed without using bedrails? 3  -AY     Moving from lying on back to sitting on the side of a flat bed without bedrails? 3  -AY     Moving to and from a bed  to a chair (including a wheelchair)? 3  -AY     Standing up from a chair using your arms (e.g., wheelchair, bedside chair)? 3  -AY     Climbing 3-5 steps with a railing? 2  -AY     To walk in hospital room? 2  -AY     AM-PAC 6 Clicks Score (PT) 16  -AY     Highest level of mobility 5 --> Static standing  -AY     Row Name 07/01/22 1152          Functional Assessment    Outcome Measure Options AM-PAC 6 Clicks Basic Mobility (PT)  -AY           User Key  (r) = Recorded By, (t) = Taken By, (c) = Cosigned By    Initials Name Provider Type    AY Cadence Young, PT Physical Therapist                             Physical Therapy Education                 Title: PT OT SLP Therapies (In Progress)     Topic: Physical Therapy (In Progress)     Point: Mobility training (Done)     Learning Progress Summary           Patient Acceptance, E,TB, VU,NR by AY at 7/1/2022 1152   Family Acceptance, E,TB, VU,NR by AY at 7/1/2022 1152                   Point: Home exercise program (Not Started)     Learner Progress:  Not documented in this visit.          Point: Body mechanics (Done)     Learning Progress Summary           Patient Acceptance, E,TB, VU,NR by AY at 7/1/2022 1152   Family Acceptance, E,TB, VU,NR by AY at 7/1/2022 1152                   Point: Precautions (Done)     Learning Progress Summary           Patient Acceptance, E,TB, VU,NR by AY at 7/1/2022 1152   Family Acceptance, E,TB, VU,NR by AY at 7/1/2022 1152                               User Key     Initials Effective Dates Name Provider Type Discipline    AY 11/10/20 -  Cadence Young, ZEESHAN Physical Therapist PT              PT Recommendation and Plan  Planned Therapy Interventions (PT): balance training, bed mobility training, gait training, home exercise program, patient/family education, strengthening, transfer training  Plan of Care Reviewed With: patient, mother  Progress: no change  Outcome Evaluation: PT initial eval completed. Pt limited by RLE weakness, impaired  sensation, decreased functional endurance, and generalized weakness. Pt amb 3ft with RWx and min Ax2, demonstrating NWB on RLE due to pain. Rec continued skilled PT. d/c rec for IRF.     Time Calculation:    PT Charges     Row Name 07/01/22 1151             Time Calculation    Start Time 1035  -AY      PT Received On 07/01/22  -AY      PT Goal Re-Cert Due Date 07/11/22  -AY              Untimed Charges    PT Eval/Re-eval Minutes 46  -AY              Total Minutes    Untimed Charges Total Minutes 46  -AY       Total Minutes 46  -AY            User Key  (r) = Recorded By, (t) = Taken By, (c) = Cosigned By    Initials Name Provider Type    AY Cadence Young, ZEESHAN Physical Therapist              Therapy Charges for Today     Code Description Service Date Service Provider Modifiers Qty    28606253888 HC PT EVAL MOD COMPLEXITY 4 7/1/2022 Cadence Young, PT GP 1          PT G-Codes  Outcome Measure Options: AM-PAC 6 Clicks Basic Mobility (PT)  AM-PAC 6 Clicks Score (PT): 16    Cadence Young PT  7/1/2022

## 2022-07-01 NOTE — PLAN OF CARE
Goal Outcome Evaluation: 36 y/o female presents to the unit from N[facility] with right sided weakness and stuttering. Patient has reported headaches today requiring PRN medications. All other aspects of function are good. Continue with POC and report and keep comfortable.

## 2022-07-01 NOTE — PLAN OF CARE
Goal Outcome Evaluation:  Plan of Care Reviewed With: patient, mother        Progress: no change  Outcome Evaluation: PT initial eval completed. Pt limited by RLE weakness, impaired sensation, decreased functional endurance, and generalized weakness. Pt amb 3ft with RWx and min Ax2, demonstrating NWB on RLE due to pain. Rec continued skilled PT. d/c rec for IRF.

## 2022-07-01 NOTE — CONSULTS
Diabetes Education    Patient Name:  Diallo Spence  YOB: 1985  MRN: [id number]  Admit Date:  6/30/2022      Order criteria not met for diabetes education consult. Current A1c is 4.8, noted during chart review. Pt has no history of DM and no home meds for DM. Thank you.      Electronically signed by:  Chayo Berkowitz RN  07/01/22 08:46 EDT

## 2022-07-01 NOTE — PLAN OF CARE
Goal Outcome Evaluation:  Plan of Care Reviewed With: patient, family           Outcome Evaluation: OT edward complete. Pt presents w/ R side weakness (RLE>RUE), RLE sensation deficits, decreased activity tolerance, and balance deficits limiting her ADL independence. Pt mod Ax2 for supine-to-sit, min Ax2 for STS, dep for LBD, mod A for UBD. Pt would benefit from continued skilled IPOT services to address current functional deficits. Rec IRF at d/c.

## 2022-07-01 NOTE — PROGRESS NOTES
Stroke Progress Note       Chief Complaint:   Right leg weakness and speech difficulty.     Subjective    Subjective     Subjective:  No acute events overnight     Review of Systems   Constitutional: No fatigue  HENT: Negative for nosebleeds and rhinorrhea.    Eyes: Negative for redness.   Respiratory: Negative for cough.    Gastrointestinal: Negative for anal bleeding.   Endocrine: Negative for polydipsia.   Genitourinary: Negative for enuresis and urgency.   Musculoskeletal: Negative for joint swelling.   Neurological: Negative for tremors.   Psychiatric/Behavioral: Negative for hallucinations.   Objective      Temp:  [98.1 °F (36.7 °C)-98.4 °F (36.9 °C)] 98.4 °F (36.9 °C)  Heart Rate:  [] 71  Resp:  [16-18] 18  BP: (106-150)/(72-98) 113/74    NEURO    MENTAL STATUS: AAOx3, memory intact, fund of knowledge appropriate    LANG/SPEECH:stammerring and hesitant speech     CRANIAL NERVES:    Pupils equal and reactive, EOMI intact, no gaze deviation, no nystagmus  No facial droop, cough and gag intact, shoulder shrug intact, tongue midline     MOTOR:  Moves all extremities equally- except right leg and arm- effort related weakness.   Arnold positive.    SENSORY: Normal to light touch all throughout     COORDINATION: Normal finger to nose and heel to shin, no tremor, no dysmetria    STATION: Not assessed due to patient condition    GAIT: Not assessed due to patient condition  Results Review:    I reviewed the patient's new clinical results.    Lab Results (last 24 hours)     Procedure Component Value Units Date/Time    CBC (No Diff) [[id number]]  (Normal) Collected: 07/01/22 0826    Specimen: Blood Updated: 07/01/22 0828     WBC 8.74 10*3/mm3      RBC 4.31 10*6/mm3      Hemoglobin 13.1 g/dL      Hematocrit 39.4 %      MCV 91.4 fL      MCH 30.4 pg      MCHC 33.2 g/dL      RDW 13.3 %      RDW-SD 45.3 fl      MPV 9.4 fL      Platelets 311 10*3/mm3     Basic Metabolic Panel [[id number]]  (Abnormal) Collected: 07/01/22  0631    Specimen: Blood Updated: 07/01/22 0736     Glucose 90 mg/dL      BUN 10 mg/dL      Creatinine 0.94 mg/dL      Sodium 134 mmol/L      Potassium 4.8 mmol/L      Chloride 103 mmol/L      CO2 20.0 mmol/L      Calcium 9.7 mg/dL      BUN/Creatinine Ratio 10.6     Anion Gap 11.0 mmol/L      eGFR 81.3 mL/min/1.73      Comment: National Kidney Foundation and American Society of Nephrology (ASN) Task Force recommended calculation based on the Chronic Kidney Disease Epidemiology Collaboration (CKD-EPI) equation refit without adjustment for race.       Narrative:      GFR Normal >60  Chronic Kidney Disease <60  Kidney Failure <15      Lipid Panel [[id number]]  (Abnormal) Collected: 07/01/22 0631    Specimen: Blood Updated: 07/01/22 0736     Total Cholesterol 181 mg/dL      Triglycerides 72 mg/dL      HDL Cholesterol 53 mg/dL      LDL Cholesterol  115 mg/dL      VLDL Cholesterol 13 mg/dL      LDL/HDL Ratio 2.14    Narrative:      Cholesterol Reference Ranges  (U.S. Department of Health and Human Services ATP III Classifications)    Desirable          <200 mg/dL  Borderline High    200-239 mg/dL  High Risk          >240 mg/dL      Triglyceride Reference Ranges  (U.S. Department of Health and Human Services ATP III Classifications)    Normal           <150 mg/dL  Borderline High  150-199 mg/dL  High             200-499 mg/dL  Very High        >500 mg/dL    HDL Reference Ranges  (U.S. Department of Health and Human Services ATP III Classifications)    Low     <40 mg/dl (major risk factor for CHD)  High    >60 mg/dl ('negative' risk factor for CHD)        LDL Reference Ranges  (U.S. Department of Health and Human Services ATP III Classifications)    Optimal          <100 mg/dL  Near Optimal     100-129 mg/dL  Borderline High  130-159 mg/dL  High             160-189 mg/dL  Very High        >189 mg/dL    Phosphorus [[id number]]  (Normal) Collected: 07/01/22 0631    Specimen: Blood Updated: 07/01/22 0736     Phosphorus 3.2 mg/dL      Magnesium [[id number]]  (Normal) Collected: 07/01/22 0631    Specimen: Blood Updated: 07/01/22 0736     Magnesium 2.4 mg/dL     POC Glucose Once [[id number]]  (Normal) Collected: 06/30/22 2146    Specimen: Blood Updated: 06/30/22 2150     Glucose 102 mg/dL      Comment: Meter: [id number] : [id number] Carmel Humphrey       Urine Drug Screen - Urine, Clean Catch [[id number]]  (Normal) Collected: 06/30/22 1753    Specimen: Urine, Clean Catch Updated: 06/30/22 1805     THC, Screen, Urine Negative     Phencyclidine (PCP), Urine Negative     Cocaine Screen, Urine Negative     Methamphetamine, Ur Negative     Opiate Screen Negative     Amphetamine Screen, Urine Negative     Benzodiazepine Screen, Urine Negative     Tricyclic Antidepressants Screen Negative     Methadone Screen, Urine Negative     Barbiturates Screen, Urine Negative     Oxycodone Screen, Urine Negative     Propoxyphene Screen Negative     Buprenorphine, Screen, Urine Negative    Narrative:      Cutoff For Drugs Screened:    Amphetamines               500 ng/ml  Barbiturates               200 ng/ml  Benzodiazepines            150 ng/ml  Cocaine                    150 ng/ml  Methadone                  200 ng/ml  Opiates                    100 ng/ml  Phencyclidine               25 ng/ml  THC                            50 ng/ml  Methamphetamine            500 ng/ml  Tricyclic Antidepressants  300 ng/ml  Oxycodone                  100 ng/ml  Propoxyphene               300 ng/ml  Buprenorphine               10 ng/ml    The normal value for all drugs tested is negative. This report includes unconfirmed screening results, with the cutoff values listed, to be used for medical treatment purposes only.  Unconfirmed results must not be used for non-medical purposes such as employment or legal testing.  Clinical consideration should be applied to any drug of abuse test, particularly when unconfirmed results are used.      Pregnancy, Urine - Urine, Clean Catch  [[id number]]  (Normal) Collected: 06/30/22 1753    Specimen: Urine, Clean Catch Updated: 06/30/22 1759     HCG, Urine QL Negative    [place] Draw [[id number]] Collected: 06/30/22 1300    Specimen: Blood Updated: 06/30/22 1703    Narrative:      The following orders were created for panel order [place] Draw.  Procedure                               Abnormality         Status                     ---------                               -----------         ------                     Green Top (Gel)[[id number]]                                  Final result               Lavender Top[[id number]]                                     Final result               Gold Top - SST[[id number]]                                   Final result               [place] Top[[id number]]                                         Final result               Light Blue Top[[id number]]                                   Final result                 Please view results for these tests on the individual orders.    [place] Top [[id number]] Collected: 06/30/22 1300    Specimen: Blood Updated: 06/30/22 1703     Extra Tube Hold for add-ons.     Comment: Auto resulted.       TSH [[id number]]  (Normal) Collected: 06/30/22 1300    Specimen: Blood Updated: 06/30/22 1632     TSH 3.580 uIU/mL     Hemoglobin A1c [[id number]]  (Normal) Collected: 06/30/22 1300    Specimen: Blood Updated: 06/30/22 1623     Hemoglobin A1C 4.80 %     Narrative:      Hemoglobin A1C Ranges:    Increased Risk for Diabetes  5.7% to 6.4%  Diabetes                     >= 6.5%  Diabetic Goal                < 7.0%    COVID PRE-OP / PRE-PROCEDURE SCREENING ORDER (NO ISOLATION) - Swab, Nasopharynx [[id number]]  (Normal) Collected: 06/30/22 1348    Specimen: Swab from Nasopharynx Updated: 06/30/22 1424    Narrative:      The following orders were created for panel order COVID PRE-OP / PRE-PROCEDURE SCREENING ORDER (NO ISOLATION) - Swab, Nasopharynx.  Procedure                               Abnormality          Status                     ---------                               -----------         ------                     COVID-19 and FLU A/B PCR...[[id number]]  Normal              Final result                 Please view results for these tests on the individual orders.    COVID-19 and FLU A/B PCR - Swab, Nasopharynx [[id number]]  (Normal) Collected: 06/30/22 1348    Specimen: Swab from Nasopharynx Updated: 06/30/22 1424     COVID19 Not Detected     Influenza A PCR Not Detected     Influenza B PCR Not Detected    Narrative:      Fact sheet for providers: https://www.fda.gov/media/103380/download    Fact sheet for patients: https://www.fda.gov/media/662432/download    Test performed by PCR.    Green Top (Gel) [[id number]] Collected: 06/30/22 1300    Specimen: Blood Updated: 06/30/22 1404     Extra Tube Hold for add-ons.     Comment: Auto resulted.       Lavender Top [[id number]] Collected: 06/30/22 1300    Specimen: Blood Updated: 06/30/22 1404     Extra Tube hold for add-on     Comment: Auto resulted       Gold Top - SST [[id number]] Collected: 06/30/22 1300    Specimen: Blood Updated: 06/30/22 1404     Extra Tube Hold for add-ons.     Comment: Auto resulted.       Light Blue Top [[id number]] Collected: 06/30/22 1300    Specimen: Blood Updated: 06/30/22 1404     Extra Tube Hold for add-ons.     Comment: Auto resulted           CT Angiogram Neck    Result Date: 6/30/2022  CT perfusion: No perfusion abnormalities in the brain.  CTA head and neck. No significant vascular abnormalities in the head or the neck. No high-grade stenosis, vessel occlusion, aneurysm or dissection.  This report was finalized on 6/30/2022 1:34 PM by Glenn Jamison MD.      MRI Brain Without Contrast    Result Date: 7/1/2022   1. No acute intracranial abnormality.  This report was finalized on 7/1/2022 8:14 AM by Miky Johnson.      XR Chest 1 View    Result Date: 6/30/2022  Interstitial prominence, a nonspecific finding which may reflect chronic  interstitial changes although atypical/viral infection or reactive airways disease could appear similarly. No focal consolidation.  This report was finalized on 6/30/2022 1:34 PM by Gucci Wells MD.      CT Head Without Contrast Stroke Protocol    Result Date: 6/30/2022  No evidence of acute intracranial disease.      Exam time shown is 12:45 PM. Study was reviewed on the CT scan monitor and discussed with Dr. Boone at 12:50 PM..  This report was finalized on 6/30/2022 1:11 PM by Dr. Reji Dunbar MD.      CT Angiogram Head w AI Analysis of LVO    Result Date: 6/30/2022  CT perfusion: No perfusion abnormalities in the brain.  CTA head and neck. No significant vascular abnormalities in the head or the neck. No high-grade stenosis, vessel occlusion, aneurysm or dissection.  This report was finalized on 6/30/2022 1:34 PM by Glenn Jamison MD.      CT CEREBRAL PERFUSION WITH & WITHOUT CONTRAST    Result Date: 6/30/2022  CT perfusion: No perfusion abnormalities in the brain.  CTA head and neck. No significant vascular abnormalities in the head or the neck. No high-grade stenosis, vessel occlusion, aneurysm or dissection.  This report was finalized on 6/30/2022 1:34 PM by Glenn Jamison MD.      Results for orders placed during the hospital encounter of 06/30/22    Adult Transthoracic Echo Complete W/ Cont if Necessary Per Protocol (With Agitated Saline)    Interpretation Summary  · Left ventricular ejection fraction appears to be 61 - 65%. Left ventricular systolic function is normal.  · Left ventricular diastolic function was normal.  · Saline test results are negative for right to left atrial level shunt.  · Normal cardiac valves.            Assessment/Plan     Assessment/Plan:  This is 37-year-old right-handed white female with active medical problems of anxiety depression panic disorder, migraine presented with an acute onset of right-sided weakness.  Patient was given IV TNKase with the presentation.       On my  exam, patient is having poor effort in the right lower extremity, inconsistent exam of the right upper extremity.  Likely this is effort related.  Family and the patient states that patient suffers with migraine, but this is atypical.       Acute stroke imaging included CT head, CTA head and neck, CT perfusion, which did not reveal any acute abnormality. MRI brain did not evidence any acute infarct.      Based on history, exam, diagnostics less likely with ischemic vascular event of the brain. Her reflexes are normal, with no back pain, and bowel bladder issues. I do not think MRI L spine is indicated at this time, given her symptoms ( stammering, right arm and right leg weakness) which does not localize to lumbar spine.  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     Plan  -discontinue aspirin and Lipitor  -stable to be transferred to the floor.   -Pt/OT/SPeech can continue to work with the patient.       Christian Rondon MD  07/01/22  13:45 EDT

## 2022-07-01 NOTE — PLAN OF CARE
Goal Outcome Evaluation:  Plan of Care Reviewed With: patient, mother        Progress: no change   SLP evaluation and treatment completed. Will address fluency disorder in tx. No further swallowing intervention indicated. Please see note for further details and recommendations.

## 2022-07-01 NOTE — THERAPY EVALUATION
Patient Name: Diallo Spence  : 1985    MRN: [id number]                              Today's Date: 2022       Admit Date: 2022    Visit Dx:     ICD-10-CM ICD-9-CM   1. Acute right-sided weakness  R53.1 728.87   2. Expressive aphasia  R47.01 784.3   3. Oral phase dysphagia  R13.11 787.21     Patient Active Problem List   Diagnosis   • Anxiety   • Depression   • Palpitations   • Gastroesophageal reflux disease without esophagitis   • Liver hemangioma   • Fatty liver   • Hiatal hernia   • Colon polyp   • ADHD   • Migraine headache   • Acute right-sided weakness   • 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     Past Medical History:   Diagnosis Date   • ADHD (attention deficit hyperactivity disorder)    • Anxiety    • Colitis 2020    11/3/2020 had colonoscopy and recommend repeat in 5 years   • Depression    • Fatty liver    • GERD (gastroesophageal reflux disease)    • Hiatal hernia    • Liver hemangioma 06/15/2022   • Liver hemangioma    • Liver problem    • Migraine headache    • Panic disorder    • Peripheral neuropathy    • Psychiatric illness    • Self-injurious behavior      Past Surgical History:   Procedure Laterality Date   •  SECTION     • CHOLECYSTECTOMY     • COLONOSCOPY      / BEBETO Reynolds- Normal    • COLONOSCOPY N/A 2016    Procedure: COLONOSCOPY CPT CODE: 92675;  Surgeon: Sherry Muse DO;  Location: [facility] OR;  Service:    • ENDOSCOPY N/A 2016    Procedure: ESOPHAGOGASTRODUODENOSCOPY WITH BIOPSY CPT CODE: 50965;  Surgeon: Sherry Muse DO;  Location: [facility] OR;  Service:    • GALLBLADDER SURGERY     • NERVE SURGERY      Right arm    • SHOULDER SURGERY     • TONSILLECTOMY     • UPPER GASTROINTESTINAL ENDOSCOPY      Dr. Jeter/ Small hiatal hernia       General Information     Row Name 22 1149          OT Time and Intention    Document Type evaluation  -MA     Mode of Treatment occupational therapy   -MA     Row Name 07/01/22 1149          General Information    Patient Profile Reviewed yes  -MA     Prior Level of Function independent:;bed mobility;ADL's;transfer;all household mobility;community mobility;work  -MA     Existing Precautions/Restrictions fall;other (see comments)  R side weakness (RLE>RUE), RLE sensation deficits, stuttering.  -MA     Barriers to Rehab medically complex  -MA     Row Name 07/01/22 1149          Living Environment    People in Home child(azucena), dependent  -MA     Row Name 07/01/22 1149          Home Main Entrance    Number of Stairs, Main Entrance five  -MA     Stair Railings, Main Entrance railings safe and in good condition  -MA     Row Name 07/01/22 1149          Stairs Within Home, Primary    Number of Stairs, Within Home, Primary none  -MA     Row Name 07/01/22 1149          Cognition    Orientation Status (Cognition) oriented x 3  -MA     Row Name 07/01/22 1149          Safety Issues, Functional Mobility    Safety Issues Affecting Function (Mobility) awareness of need for assistance;insight into deficits/self-awareness;safety precaution awareness;safety precautions follow-through/compliance;sequencing abilities  -MA     Impairments Affecting Function (Mobility) balance;coordination;endurance/activity tolerance;grasp;motor planning;pain;sensation/sensory awareness;strength  -MA           User Key  (r) = Recorded By, (t) = Taken By, (c) = Cosigned By    Initials Name Provider Type    Kelly Smalls OT Occupational Therapist                 Mobility/ADL's     Row Name 07/01/22 1149          Bed Mobility    Bed Mobility supine-sit  -MA     Supine-Sit [place] (Bed Mobility) moderate assist (50% patient effort);2 person assist;verbal cues  -MA     Bed Mobility, Safety Issues decreased use of arms for pushing/pulling;decreased use of legs for bridging/pushing;impaired trunk control for bed mobility  -MA     Assistive Device (Bed Mobility) bed rails;draw sheet;head of bed  elevated  -[facility] Name 07/01/22 1149          Transfers    Transfers sit-stand transfer;stand-sit transfer  -MA     Comment, (Transfers) Pt min Ax2 for STS w/ BUE support, VC's for sequencing and safe hand placement.  -MA     Sit-Stand [place] (Transfers) 2 person assist;verbal cues;minimum assist (75% patient effort)  -MA     Stand-Sit [place] (Transfers) 2 person assist;verbal cues;minimum assist (75% patient effort)  -[facility] Name 07/01/22 1149          Sit-Stand Transfer    Assistive Device (Sit-Stand Transfers) other (see comments)  -[facility] Name 07/01/22 1149          Stand-Sit Transfer    Assistive Device (Stand-Sit Transfers) other (see comments)  -MA     Row Name 07/01/22 1149          Functional Mobility    Functional Mobility- Comment Deferred to PT  -MA     Row Name 07/01/22 1149          Activities of Daily Living    BADL Assessment/Intervention lower body dressing;grooming;feeding;upper body dressing  -MA     Row Name 07/01/22 1149          Lower Body Dressing Assessment/Training    [place] Level (Lower Body Dressing) don;socks;dependent (less than 25% patient effort);verbal cues  -MA     Position (Lower Body Dressing) supine  -[facility] Name 07/01/22 1149          Grooming Assessment/Training    [place] Level (Grooming) hair care, combing/brushing;set up  -MA     Row Name 07/01/22 1149          Self-Feeding Assessment/Training    [place] Level (Feeding) scoop food and bring to mouth;set up  -MA     Row Name 07/01/22 1149          Upper Body Dressing Assessment/Training    [place] Level (Upper Body Dressing) don;pajama/robe;moderate assist (50% patient effort);verbal cues  -MA           User Key  (r) = Recorded By, (t) = Taken By, (c) = Cosigned By    Initials Name Provider Type    Kelly Smalls OT Occupational Therapist               Obj/Interventions     [facility] Name 07/01/22 1149          Sensory Assessment (Somatosensory)    Sensory Assessment  (Somatosensory) UE sensation intact  -MA     [facility] Name 07/01/22 1149          Vision Assessment/Intervention    Visual Impairment/Limitations WFL  -[facility] Name 07/01/22 1149          Range of Motion Comprehensive    General Range of Motion bilateral upper extremity ROM WFL  -[facility] Name 07/01/22 1149          Strength Comprehensive (MMT)    General Manual Muscle Testing (MMT) Assessment upper extremity strength deficits identified  -MA     Comment, General Manual Muscle Testing (MMT) Assessment Noted inconsistencies w/ RUE strength w/ formal assessment vs functional activity. Based on formal assessment RUE grossly 3+/5, LUE grossly 4/5  -[facility] Name 07/01/22 1149          Motor Skills    Motor Skills coordination  -MA     Coordination WFL;bilateral;upper extremity;finger to nose  -[facility] Name 07/01/22 1149          Balance    Balance Assessment sitting static balance;standing static balance  -MA     Static Sitting Balance contact guard  -MA     Position, Sitting Balance unsupported;sitting edge of bed  -MA     Static Standing Balance minimal assist;2-person assist;verbal cues  -MA     Position/Device Used, Standing Balance supported  -MA     Balance Interventions sitting;sit to stand;occupation based/functional task  -MA           User Key  (r) = Recorded By, (t) = Taken By, (c) = Cosigned By    Initials Name Provider Type    Kelly Smalls OT Occupational Therapist               Goals/Plan     Row Name 07/01/22 1315          Bed Mobility Goal 1 (OT)    Activity/Assistive Device (Bed Mobility Goal 1, OT) sit to supine;supine to sit  -MA     [place] Level/Cues Needed (Bed Mobility Goal 1, OT) minimum assist (75% or more patient effort);verbal cues required  -MA     Time Frame (Bed Mobility Goal 1, OT) long term goal (LTG);10 days  -MA     Progress/Outcomes (Bed Mobility Goal 1, OT) goal ongoing  -[facility] Name 07/01/22 1315          Transfer Goal 1 (OT)    Activity/Assistive Device  (Transfer Goal 1, OT) sit-to-stand/stand-to-sit;bed-to-chair/chair-to-bed;commode, bedside without drop arms;walker, rolling  -MA     [place] Level/Cues Needed (Transfer Goal 1, OT) contact guard required;verbal cues required  -MA     Time Frame (Transfer Goal 1, OT) long term goal (LTG);10 days  -MA     Progress/Outcome (Transfer Goal 1, OT) goal ongoing  -MA     Row Name 07/01/22 1315          Dressing Goal 1 (OT)    Activity/Device (Dressing Goal 1, OT) lower body dressing  don/doff socks w/ AAD  -MA     [place]/Cues Needed (Dressing Goal 1, OT) minimum assist (75% or more patient effort);verbal cues required  -MA     Time Frame (Dressing Goal 1, OT) long term goal (LTG);10 days  -MA     Progress/Outcome (Dressing Goal 1, OT) goal ongoing  -MA     Row Name 07/01/22 1315          Therapy Assessment/Plan (OT)    Planned Therapy Interventions (OT) activity tolerance training;adaptive equipment training;BADL retraining;functional balance retraining;neuromuscular control/coordination retraining;occupation/activity based interventions;passive ROM/stretching;patient/caregiver education/training;ROM/therapeutic exercise;strengthening exercise;transfer/mobility retraining  -MA           User Key  (r) = Recorded By, (t) = Taken By, (c) = Cosigned By    Initials Name Provider Type    Kelly Smalls, JASMIN Occupational Therapist               Clinical Impression     Row Name 07/01/22 1149          Pain Assessment    Pretreatment Pain Rating 0/10 - no pain  -MA     Posttreatment Pain Rating 0/10 - no pain  -MA     Row Name 07/01/22 1145          Plan of Care Review    Plan of Care Reviewed With patient;family  -MA     Outcome Evaluation OT eval complete. Pt presents w/ R side weakness (RLE>RUE), RLE sensation deficits, decreased activity tolerance, and balance deficits limiting her ADL independence. Pt mod Ax2 for supine-to-sit, min Ax2 for STS, dep for LBD, mod A for UBD. Pt would benefit from continued skilled  IPOT services to address current functional deficits. Rec IRF at d/c.  -MA     Row Name 07/01/22 1149          Therapy Assessment/Plan (OT)    Rehab Potential (OT) good, to achieve stated therapy goals  -MA     Criteria for Skilled Therapeutic Interventions Met (OT) yes;skilled treatment is necessary  -MA     Therapy Frequency (OT) daily  -MA     Row Name 07/01/22 1149          Therapy Plan Review/Discharge Plan (OT)    Anticipated Discharge Disposition (OT) inpatient rehabilitation facility  -MA     Row Name 07/01/22 1149          Vital Signs    Pre Systolic BP Rehab 110  -MA     Pre Treatment Diastolic BP 80  -MA     Post Systolic BP Rehab 133  -MA     Post Treatment Diastolic BP 88  -MA     Pretreatment Heart Rate (beats/min) 76  -MA     Posttreatment Heart Rate (beats/min) 74  -MA     Pre SpO2 (%) 100  -MA     O2 Delivery Pre Treatment room air  -MA     O2 Delivery Intra Treatment room air  -MA     Post SpO2 (%) 100  -MA     O2 Delivery Post Treatment room air  -MA     Pre Patient Position Supine  -MA     Intra Patient Position Standing  -MA     Post Patient Position Sitting  -MA     Row Name 07/01/22 1149          Positioning and Restraints    Pre-Treatment Position in bed  -MA     Post Treatment Position bed  -MA     In Bed sitting EOB;with PT  -MA           User Key  (r) = Recorded By, (t) = Taken By, (c) = Cosigned By    Initials Name Provider Type    Klely Smalls, JASMIN Occupational Therapist               Outcome Measures     Row Name 07/01/22 1316          How much help from another is currently needed...    Putting on and taking off regular lower body clothing? 2  -MA     Bathing (including washing, rinsing, and drying) 2  -MA     Toileting (which includes using toilet bed pan or urinal) 3  -MA     Putting on and taking off regular upper body clothing 3  -MA     Taking care of personal grooming (such as brushing teeth) 3  -MA     Eating meals 4  -MA     AM-PAC 6 Clicks Score (OT) 17  -MA     Row Name  07/01/22 1152          How much help from another person do you currently need...    Turning from your back to your side while in flat bed without using bedrails? 3  -AY     Moving from lying on back to sitting on the side of a flat bed without bedrails? 3  -AY     Moving to and from a bed to a chair (including a wheelchair)? 3  -AY     Standing up from a chair using your arms (e.g., wheelchair, bedside chair)? 3  -AY     Climbing 3-5 steps with a railing? 2  -AY     To walk in hospital room? 2  -AY     AM-PAC 6 Clicks Score (PT) 16  -AY     Highest level of mobility 5 --> Static standing  -AY     Row Name 07/01/22 1316 07/01/22 1152       Functional Assessment    Outcome Measure Options AM-PAC 6 Clicks Daily Activity (OT)  -MA AM-PAC 6 Clicks Basic Mobility (PT)  -AY          User Key  (r) = Recorded By, (t) = Taken By, (c) = Cosigned By    Initials Name Provider Type    AY Cadence Young, PT Physical Therapist    Kelly Smalls, JASMIN Occupational Therapist                Occupational Therapy Education                 Title: PT OT SLP Therapies (In Progress)     Topic: Occupational Therapy (In Progress)     Point: ADL training (Done)     Description:   Instruct learner(s) on proper safety adaptation and remediation techniques during self care or transfers.   Instruct in proper use of assistive devices.              Learning Progress Summary           Patient Acceptance, E, VU by MA at 7/1/2022 1317                   Point: Home exercise program (Not Started)     Description:   Instruct learner(s) on appropriate technique for monitoring, assisting and/or progressing therapeutic exercises/activities.              Learner Progress:  Not documented in this visit.          Point: Precautions (Done)     Description:   Instruct learner(s) on prescribed precautions during self-care and functional transfers.              Learning Progress Summary           Patient Acceptance, E, VU by MA at 7/1/2022 1317                    Point: Body mechanics (Done)     Description:   Instruct learner(s) on proper positioning and spine alignment during self-care, functional mobility activities and/or exercises.              Learning Progress Summary           Patient Acceptance, E, VU by MA at 7/1/2022 1317                               User Key     Initials Effective Dates Name Provider Type Discipline    MA 11/19/20 -  Kelly Casarez OT Occupational Therapist OT              OT Recommendation and Plan  Planned Therapy Interventions (OT): activity tolerance training, adaptive equipment training, BADL retraining, functional balance retraining, neuromuscular control/coordination retraining, occupation/activity based interventions, passive ROM/stretching, patient/caregiver education/training, ROM/therapeutic exercise, strengthening exercise, transfer/mobility retraining  Therapy Frequency (OT): daily  Plan of Care Review  Plan of Care Reviewed With: patient, family  Outcome Evaluation: OT eval complete. Pt presents w/ R side weakness (RLE>RUE), RLE sensation deficits, decreased activity tolerance, and balance deficits limiting her ADL independence. Pt mod Ax2 for supine-to-sit, min Ax2 for STS, dep for LBD, mod A for UBD. Pt would benefit from continued skilled IPOT services to address current functional deficits. Rec IRF at d/c.     Time Calculation:    Time Calculation- OT     Row Name 07/01/22 1317             Time Calculation- OT    OT Start Time 1016  -MA      OT Received On 07/01/22  -MA      OT Goal Re-Cert Due Date 07/11/22  -MA              Timed Charges    02854 - OT Therapeutic Activity Minutes 5  -MA      11073 - OT Self Care/Mgmt Minutes 6  -MA              Untimed Charges    OT Eval/Re-eval Minutes 31  -MA              Total Minutes    Timed Charges Total Minutes 11  -MA      Untimed Charges Total Minutes 31  -MA       Total Minutes 42  -MA            User Key  (r) = Recorded By, (t) = Taken By, (c) = Cosigned By    Initials Name  Provider Type    Kelly Smalls OT Occupational Therapist              Therapy Charges for Today     Code Description Service Date Service Provider Modifiers Qty    38553962527 HC OT SELF CARE/MGMT/TRAIN EA 15 MIN 7/1/2022 Kelly Casarez OT GO 1    43960304457 HC OT EVAL MOD COMPLEXITY 3 7/1/2022 Kelly Casarez OT GO 1               Kelly Casarez OT  7/1/2022

## 2022-07-01 NOTE — PAYOR COMM NOTE
"Ref # [id number]  Cadence Lan RN, BSN, CMGT-BC  Phone # [phone]  Fax # [phone]  Jason Jones (37 y.o. Female)             Date of Birth   1985    Social Security Number       Address   [address] [id number]    Home Phone   [phone]    MRN   [id number]       Jehovah's witness   [employer]    Marital Status                               Admission Date   6/30/22    Admission Type   Emergency    Admitting Provider   Danya Rosa MD    Attending Provider   Danya Rosa MD    Department, Room/Bed   [address] ICU, N230/1       Discharge Date       Discharge Disposition       Discharge Destination                               Attending Provider: Danya Rosa MD    Allergies: Latex    Isolation: None   Infection: None   Code Status: CPR   Advance Care Planning Activity    Ht: 162.6 cm (64.02\")   Wt: 74.8 kg (164 lb 14.5 oz)    Admission Cmt: None   Principal Problem: 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 [F44.4]                 Active Insurance as of 6/30/2022     Primary Coverage     Payor Plan Insurance Group Employer/Plan Group    [employer] PREVENTATIVE [id number]     Payor Plan Address Payor Plan Phone Number Payor Plan Fax Number Effective Dates    [address] [phone]  1/1/2022 - None Entered    [place] [address]       Subscriber Name Subscriber Birth Date Member ID       JASON JONES 1985 [id number]           Secondary Coverage     Payor Plan Insurance Group Employer/Plan Group    [employer] BLUE CROSS BLUE [employer] PPO [id number]     Payor Plan Address Payor Plan Phone Number Payor Plan Fax Number Effective Dates    [address] [phone]  1/1/2016 - None Entered    [place] [address]       Subscriber Name Subscriber Birth Date Member ID       JASON JONES 1985 [id number]                    History & Physical    "   Danya Rosa MD at 06/30/22 1355              ICU ADMISSION NOTE    Chief complaint         Subjective     Patient is a 37 y.o. female presents with PMH migraines, GERD, hiatal hernia, anxiety, depression and ADHD (on Concerta) who presented to the ED today for stroke-like symptoms.  She was in her usual state of health this morning.  She did have a headache and took Excedrin.  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  She was given 162 mg ASA at the clinic.  EMS was called and then they called for the flight crew who gave her zofran for nausea en route.  In the ED, NIHSS was initially 8 and worsened to 10.  CTH was negative.  CTA H/N and CTP was unremarkable.  She was given TNK at 1305.  MRI Brain has been ordered.      Per records, she is followed by J[facility] Avalos in psychiatry for anxiety, depression and ADHD.  Ritalin was changed to Concerta recently and Prozac increased.     Review of Systems  Review of Systems   Constitutional: Positive for activity change. Negative for fever.   HENT: Negative for congestion.    Eyes: Negative for visual disturbance.   Respiratory: Negative for chest tightness and shortness of breath.    Cardiovascular: Negative for chest pain and palpitations.   Gastrointestinal: Negative for abdominal pain.   Endocrine: Negative for cold intolerance and heat intolerance.   Genitourinary: Negative for difficulty urinating.   Musculoskeletal: Positive for arthralgias.   Skin: Negative for rash.   Allergic/Immunologic: Negative for immunocompromised state.   Neurological: Positive for dizziness, speech difficulty, weakness, light-headedness and numbness.   Hematological: Does not bruise/bleed easily.   Psychiatric/Behavioral: Positive for dysphoric mood and sleep disturbance. The patient is nervous/anxious and is hyperactive.         Home Medications  Medications Prior  to Admission   Medication Sig Dispense Refill Last Dose   • Cholecalciferol (Vitamin D3) 25 MCG (1000 UT) capsule Take 1 capsule by mouth Daily. 60 capsule 0 6/30/2022 at Unknown time   • FLUoxetine (PROzac) 20 MG capsule Take 3 capsules by mouth Daily. 90 capsule 0 6/30/2022 at Unknown time   • methylphenidate (Concerta) 18 MG CR tablet Take 1 tablet by mouth Daily 30 tablet 0 6/30/2022 at Unknown time   • methylPREDNISolone (MEDROL) 4 MG dose pack Take as directed on package instructions. (Patient taking differently: Take as directed on package instructions.  For 6 days, started on 06-28-22) 21 tablet 0 6/30/2022 at Unknown time   • metoprolol succinate XL (TOPROL-XL) 25 MG 24 hr tablet Take 1/2 tablet by mouth Daily. 30 tablet 3 6/30/2022 at Unknown time   • pantoprazole (Protonix) 40 MG EC tablet Take 1 tablet by mouth Daily. 90 tablet 1 6/30/2022 at Unknown time   • sulfamethoxazole-trimethoprim (BACTRIM DS,SEPTRA DS) 800-160 MG per tablet Take 1 tablet by mouth 2 (Two) Times a Day. (Patient taking differently: Take 1 tablet by mouth 2 (Two) Times a Day. For 10 days, started on 06-28-22) 20 tablet 0 6/30/2022 at Unknown time   • SUMAtriptan (Imitrex) 25 MG tablet Take 1 tablet by mouth 1 Time As Needed for Migraine. Take 1 tablet at onset of headache. May repeat dose 1 time in 2 hours if headache not relieved. 9 tablet 1 Past Week at Unknown time   Vitamin D3 25 mcg daily  Prozac 60 mg daily  Concerta 18 mg CR daily  Metoprolol XL 25 mg 1/2 tablet daily  Protonix 40 mg daily  Imitrex 25 mg as needed    History  Past Medical History:   Diagnosis Date   • ADHD (attention deficit hyperactivity disorder)    • Anxiety    • Colitis 07/2020    11/3/2020 had colonoscopy and recommend repeat in 5 years   • Depression    • Fatty liver    • GERD (gastroesophageal reflux disease)    • Hiatal hernia    • Liver hemangioma 06/15/2022   • Liver hemangioma    • Liver problem    • Migraine headache    • Panic disorder    •  Peripheral neuropathy    • Psychiatric illness    • Self-injurious behavior      Past Surgical History:   Procedure Laterality Date   •  SECTION     • CHOLECYSTECTOMY     • COLONOSCOPY      / BEBETO Reynolds- Normal    • COLONOSCOPY N/A 2016    Procedure: COLONOSCOPY CPT CODE: 27515;  Surgeon: Sherry Muse DO;  Location:  [facility] OR;  Service:    • ENDOSCOPY N/A 2016    Procedure: ESOPHAGOGASTRODUODENOSCOPY WITH BIOPSY CPT CODE: 76724;  Surgeon: Sherry Muse DO;  Location:  [facility] OR;  Service:    • GALLBLADDER SURGERY     • NERVE SURGERY      Right arm    • SHOULDER SURGERY     • TONSILLECTOMY     • UPPER GASTROINTESTINAL ENDOSCOPY      Dr. Jeter/ Small hiatal hernia      Family History   Problem Relation Age of Onset   • No Known Problems Mother    • Diabetes Father    • Atrial fibrillation Father    • Hypertension Father    • Hyperlipidemia Father    • Drug abuse Brother    • Anxiety disorder Brother    • Alcohol abuse Brother    • Drug abuse Brother    • Depression Brother    • Heart disease Maternal Grandmother    • Bone cancer Paternal Grandfather    • Diabetes Paternal Grandfather    • Dementia Paternal Grandmother    • Heart disease Paternal Grandmother    • Diabetes Other    • Heart disease Other    • Hypertension Other      Social History     Tobacco Use   • Smoking status: Never Smoker   • Smokeless tobacco: Never Used   Vaping Use   • Vaping Use: Never used   Substance Use Topics   • Alcohol use: Yes     Comment: very limited socially   • Drug use: No     Medications Prior to Admission   Medication Sig Dispense Refill Last Dose   • Cholecalciferol (Vitamin D3) 25 MCG (1000 UT) capsule Take 1 capsule by mouth Daily. 60 capsule 0 2022 at Unknown time   • FLUoxetine (PROzac) 20 MG capsule Take 3 capsules by mouth Daily. 90 capsule 0 2022 at Unknown time   • methylphenidate (Concerta) 18 MG CR tablet Take 1 tablet by mouth Daily 30 tablet 0 2022 at  "Unknown time   • methylPREDNISolone (MEDROL) 4 MG dose pack Take as directed on package instructions. (Patient taking differently: Take as directed on package instructions.  For 6 days, started on 06-28-22) 21 tablet 0 6/30/2022 at Unknown time   • metoprolol succinate XL (TOPROL-XL) 25 MG 24 hr tablet Take 1/2 tablet by mouth Daily. 30 tablet 3 6/30/2022 at Unknown time   • pantoprazole (Protonix) 40 MG EC tablet Take 1 tablet by mouth Daily. 90 tablet 1 6/30/2022 at Unknown time   • sulfamethoxazole-trimethoprim (BACTRIM DS,SEPTRA DS) 800-160 MG per tablet Take 1 tablet by mouth 2 (Two) Times a Day. (Patient taking differently: Take 1 tablet by mouth 2 (Two) Times a Day. For 10 days, started on 06-28-22) 20 tablet 0 6/30/2022 at Unknown time   • SUMAtriptan (Imitrex) 25 MG tablet Take 1 tablet by mouth 1 Time As Needed for Migraine. Take 1 tablet at onset of headache. May repeat dose 1 time in 2 hours if headache not relieved. 9 tablet 1 Past Week at Unknown time     Allergies:  Latex      Objective     Vital Signs  Blood pressure 139/85, pulse 78, temperature 98.3 °F (36.8 °C), temperature source Oral, resp. rate 16, height 162.6 cm (64\"), weight 74.8 kg (165 lb), SpO2 100 %.    Physical Exam:  General Appearance:   Well-developed young woman in no respiratory distress   Head:   Normocephalic, atraumatic   Eyes:          No jaundice, conjunctiva pink   Ears:     Throat:  Oral mucosa moist   Neck:  Trachea midline, no palpable thyroid   Back:      Lungs:    Symmetric chest expansion.  Breath sounds are bilateral without wheeze    Heart:   Regular rhythm, S1, S2 auscultated   Abdomen:    Flat, bowel sounds present, soft   Rectal:     Deferred   Extremities:  No edema or cyanosis   Pulses:  Pedal and radial pulses present   Skin:  Warm and dry without rash   Lymph nodes:  No cervical adenopathy   Neurologic:  Alert and oriented x3.  Speech is stuttering.  Face is symmetric.  Numbness on the right face and right " upper extremity.  Right upper extremity has motor drift.  She is unable to lift her right lower extremity.  Left upper and lower extremity have no drift.   Interval:  (unit admission)  1a. Level of Consciousness: 0-->Alert, keenly responsive  1b. LOC Questions: 0-->Answers both questions correctly  1c. LOC Commands: 0-->Performs both tasks correctly  2. Best Gaze: 0-->Normal  3. Visual: 0-->No visual loss  4. Facial Palsy: 0-->Normal symmetrical movements  5a. Motor Arm, Left: 0-->No drift, limb holds 90 (or 45) degrees for full 10 secs  5b. Motor Arm, Right: 0-->No drift, limb holds 90 (or 45) degrees for full 10 secs  6a. Motor Leg, Left: 0-->No drift, leg holds 30 degree position for full 5 secs  6b. Motor Leg, Right: 2-->Some effort against gravity, leg falls to bed by 5 secs, but has some effort against gravity  7. Limb Ataxia: 1-->Present in one limb  8. Sensory: 1-->Mild-to-moderate sensory loss, patient feels pinprick is less sharp or is dull on the affected side, or there is a loss of superficial pain with pinprick, but patient is aware of being touched  9. Best Language: 1-->Mild-to-moderate aphasia, some obvious loss of fluency or facility of comprehension, without significant limitation on ideas expressed or form of expression. Reduction of speech and/or comprehension, however, makes conversation. . . (see row details)  10. Dysarthria: 0-->Normal  11. Extinction and Inattention (formerly Neglect): 0-->No abnormality    Total (NIH Stroke Scale): 5    Results Review:   Lab Results (last 24 hours)     Procedure Component Value Units Date/Time    TSH [[id number]]  (Normal) Collected: 06/30/22 1300    Specimen: Blood Updated: 06/30/22 1632     TSH 3.580 uIU/mL     Hemoglobin A1c [[id number]]  (Normal) Collected: 06/30/22 1300    Specimen: Blood Updated: 06/30/22 1623     Hemoglobin A1C 4.80 %     Narrative:      Hemoglobin A1C Ranges:    Increased Risk for Diabetes  5.7% to 6.4%  Diabetes                     >=  6.5%  Diabetic Goal                < 7.0%    COVID PRE-OP / PRE-PROCEDURE SCREENING ORDER (NO ISOLATION) - Swab, Nasopharynx [[id number]]  (Normal) Collected: 06/30/22 1348    Specimen: Swab from Nasopharynx Updated: 06/30/22 1424    Narrative:      The following orders were created for panel order COVID PRE-OP / PRE-PROCEDURE SCREENING ORDER (NO ISOLATION) - Swab, Nasopharynx.  Procedure                               Abnormality         Status                     ---------                               -----------         ------                     COVID-19 and FLU A/B PCR...[[id number]]  Normal              Final result                 Please view results for these tests on the individual orders.    COVID-19 and FLU A/B PCR - Swab, Nasopharynx [[id number]]  (Normal) Collected: 06/30/22 1348    Specimen: Swab from Nasopharynx Updated: 06/30/22 1424     COVID19 Not Detected     Influenza A PCR Not Detected     Influenza B PCR Not Detected    Narrative:      Fact sheet for providers: https://www.fda.gov/media/644874/download    Fact sheet for patients: https://www.fda.gov/media/151203/download    Test performed by PCR.    [facility] Draw [[id number]] Collected: 06/30/22 1300    Specimen: Blood Updated: 06/30/22 1404    Narrative:      The following orders were created for panel order [place] Draw.  Procedure                               Abnormality         Status                     ---------                               -----------         ------                     Green Top (Gel)[[id number]]                                  Final result               Lavender Top[[id number]]                                     Final result               Gold Top - SST[[id number]]                                   Final result               Bradford Top[[id number]]                                         In process                 Light Blue Top[[id number]]                                   Final result                 Please view results for  these tests on the individual orders.    Green Top (Gel) [[id number]] Collected: 06/30/22 1300    Specimen: Blood Updated: 06/30/22 1404     Extra Tube Hold for add-ons.     Comment: Auto resulted.       Lavender Top [[id number]] Collected: 06/30/22 1300    Specimen: Blood Updated: 06/30/22 1404     Extra Tube hold for add-on     Comment: Auto resulted       Gold Top - SST [[id number]] Collected: 06/30/22 1300    Specimen: Blood Updated: 06/30/22 1404     Extra Tube Hold for add-ons.     Comment: Auto resulted.       Light Blue Top [[id number]] Collected: 06/30/22 1300    Specimen: Blood Updated: 06/30/22 1404     Extra Tube Hold for add-ons.     Comment: Auto resulted       Troponin [[id number]]  (Normal) Collected: 06/30/22 1300    Specimen: Blood Updated: 06/30/22 1334     Troponin T <0.010 ng/mL     Narrative:      Troponin T Reference Range:  <= 0.03 ng/mL-   Negative for AMI  >0.03 ng/mL-     Abnormal for myocardial necrosis.  Clinicians would have to utilize clinical acumen, EKG, Troponin and serial changes to determine if it is an Acute Myocardial Infarction or myocardial injury due to an underlying chronic condition.       Results may be falsely decreased if patient taking Biotin.      AST [[id number]]  (Normal) Collected: 06/30/22 1300    Specimen: Blood Updated: 06/30/22 1334     AST (SGOT) 17 U/L     ALT [[id number]]  (Normal) Collected: 06/30/22 1300    Specimen: Blood Updated: 06/30/22 1334     ALT (SGPT) 11 U/L     aPTT [[id number]]  (Normal) Collected: 06/30/22 1300    Specimen: Blood Updated: 06/30/22 1326     PTT 31.9 seconds     Narrative:      PTT = The equivalent PTT values for the therapeutic range of heparin levels at 0.3 to 0.5 U/ml are 60 to 70 seconds.    CBC & Differential [[id number]]  (Abnormal) Collected: 06/30/22 1300    Specimen: Blood Updated: 06/30/22 1312    Narrative:      The following orders were created for panel order CBC & Differential.  Procedure                                Abnormality         Status                     ---------                               -----------         ------                     CBC Auto Differential[[id number]]        Abnormal            Final result                 Please view results for these tests on the individual orders.    CBC Auto Differential [[id number]]  (Abnormal) Collected: 06/30/22 1300    Specimen: Blood Updated: 06/30/22 1312     WBC 10.54 10*3/mm3      RBC 4.18 10*6/mm3      Hemoglobin 12.7 g/dL      Hematocrit 37.0 %      MCV 88.5 fL      MCH 30.4 pg      MCHC 34.3 g/dL      RDW 13.2 %      RDW-SD 43.3 fl      MPV 9.6 fL      Platelets 329 10*3/mm3      Neutrophil % 75.6 %      Lymphocyte % 18.2 %      Monocyte % 4.9 %      Eosinophil % 0.4 %      Basophil % 0.6 %      Immature Grans % 0.3 %      Neutrophils, Absolute 7.97 10*3/mm3      Lymphocytes, Absolute 1.92 10*3/mm3      Monocytes, Absolute 0.52 10*3/mm3      Eosinophils, Absolute 0.04 10*3/mm3      Basophils, Absolute 0.06 10*3/mm3      Immature Grans, Absolute 0.03 10*3/mm3      nRBC 0.0 /100 WBC     Bradford Top [[id number]] Collected: 06/30/22 1300    Specimen: Blood Updated: 06/30/22 1307    POC CHEM 8 [[id number]]  (Abnormal) Collected: 06/30/22 1255    Specimen: Blood Updated: 06/30/22 1258     Glucose 106 mg/dL      BUN 13 mg/dL      Creatinine 0.90 mg/dL      Sodium 139 mmol/L      POC Potassium 3.1 mmol/L      Chloride 107 mmol/L      Total CO2 18 mmol/L      Hemoglobin 13.6 g/dL      Comment: Serial Number: [id number]:  [id number]        Hematocrit 40 %      Ionized Calcium 1.23 mmol/L      eGFR 85.7 mL/min/1.73         Imaging Results (Last 24 Hours)     Procedure Component Value Units Date/Time    CT Angiogram Head w AI Analysis of LVO [[id number]] Collected: 06/30/22 1324     Updated: 06/30/22 1337    Narrative:      DATE OF EXAM: 6/30/2022 12:59 PM     PROCEDURE: CT CEREBRAL PERFUSION W WO CONTRAST-, CT ANGIOGRAM NECK-, CT  ANGIOGRAM HEAD W AI ANALYSIS OF LVO-     INDICATIONS:  Neuro deficit, acute, stroke suspected     COMPARISON: No comparisons available.     TECHNIQUE: Routine transaxial cuts were obtained through the head  without administration of contrast. Routine transaxial cuts were then  obtained through the head following the intravenous administration of  115 mL of Isovue 300. Core blood volume, core blood flow, mean transit  time, and Tmax images were obtained utilizing the Rapid software  protocol. A limited CT angiogram of the head was also performed to  measure the blood vessel density.     Contrast-enhanced volumetric CT angiographic imaging of the head and  neck was performed using the IV contrast dose referenced above. 3-D and  color rendered reconstructions were created for interpretation.     The radiation dose reduction device was turned on for each scan per the  ALARA (As Low as Reasonably Achievable) protocol.     FINDINGS:   CT perfusion: There is no evidence of a perfusion abnormality in the  brain. No core infarct or brain at risk.     CTA head and neck:  CT angiography: The aortic arch is normal. There is conventional 3  vessel arch anatomy. The right brachiocephalic and bilateral subclavian  arteries appear widely patent. Bilateral common carotid arteries are  normal. The carotid bifurcations, ECA and distal branches and cervical  internal carotid arteries appear normal. The intracranial ICA segments  appear normal. There is a patent posterior communicating artery on both  sides. There is a patent anterior communicating artery. The A1 and M1  segments and distal LILLIAM and MCA branches appear patent.     There are codominant vertebral arteries. The vertebral arteries follow a  normal course and appear normal caliber throughout the neck and into the  head. The V4 segments appear normal. The basilar artery, superior  cerebellar arteries, P1 segments and distal PCA branches appear widely  patent. Dural venous sinuses appear normal.     Nonvascular findings: Lung  apices are clear. Superior mediastinal  structures appear normal. Thyroid and salivary glands appear normal.  There are no acute intracranial findings. No abnormal extra-axial  collections. Orbits appear within normal limits. The paranasal sinuses  and the mastoid air cells appear well aerated. Dentition is  unremarkable. There is no evidence of a neck mass or adenopathy.        Impression:      CT perfusion: No perfusion abnormalities in the brain.     CTA head and neck. No significant vascular abnormalities in the head or  the neck. No high-grade stenosis, vessel occlusion, aneurysm or  dissection.     This report was finalized on 6/30/2022 1:34 PM by Glenn Jamison MD.       CT CEREBRAL PERFUSION WITH & WITHOUT CONTRAST [[id number]] Collected: 06/30/22 1324     Updated: 06/30/22 1337    Narrative:      DATE OF EXAM: 6/30/2022 12:59 PM     PROCEDURE: CT CEREBRAL PERFUSION W WO CONTRAST-, CT ANGIOGRAM NECK-, CT  ANGIOGRAM HEAD W AI ANALYSIS OF LVO-     INDICATIONS: Neuro deficit, acute, stroke suspected     COMPARISON: No comparisons available.     TECHNIQUE: Routine transaxial cuts were obtained through the head  without administration of contrast. Routine transaxial cuts were then  obtained through the head following the intravenous administration of  115 mL of Isovue 300. Core blood volume, core blood flow, mean transit  time, and Tmax images were obtained utilizing the Rapid software  protocol. A limited CT angiogram of the head was also performed to  measure the blood vessel density.     Contrast-enhanced volumetric CT angiographic imaging of the head and  neck was performed using the IV contrast dose referenced above. 3-D and  color rendered reconstructions were created for interpretation.     The radiation dose reduction device was turned on for each scan per the  ALARA (As Low as Reasonably Achievable) protocol.     FINDINGS:   CT perfusion: There is no evidence of a perfusion abnormality in the  brain. No  core infarct or brain at risk.     CTA head and neck:  CT angiography: The aortic arch is normal. There is conventional 3  vessel arch anatomy. The right brachiocephalic and bilateral subclavian  arteries appear widely patent. Bilateral common carotid arteries are  normal. The carotid bifurcations, ECA and distal branches and cervical  internal carotid arteries appear normal. The intracranial ICA segments  appear normal. There is a patent posterior communicating artery on both  sides. There is a patent anterior communicating artery. The A1 and M1  segments and distal LILLIAM and MCA branches appear patent.     There are codominant vertebral arteries. The vertebral arteries follow a  normal course and appear normal caliber throughout the neck and into the  head. The V4 segments appear normal. The basilar artery, superior  cerebellar arteries, P1 segments and distal PCA branches appear widely  patent. Dural venous sinuses appear normal.     Nonvascular findings: Lung apices are clear. Superior mediastinal  structures appear normal. Thyroid and salivary glands appear normal.  There are no acute intracranial findings. No abnormal extra-axial  collections. Orbits appear within normal limits. The paranasal sinuses  and the mastoid air cells appear well aerated. Dentition is  unremarkable. There is no evidence of a neck mass or adenopathy.        Impression:      CT perfusion: No perfusion abnormalities in the brain.     CTA head and neck. No significant vascular abnormalities in the head or  the neck. No high-grade stenosis, vessel occlusion, aneurysm or  dissection.     This report was finalized on 6/30/2022 1:34 PM by Glenn Jamison MD.       CT Angiogram Neck [[id number]] Collected: 06/30/22 1324     Updated: 06/30/22 1337    Narrative:      DATE OF EXAM: 6/30/2022 12:59 PM     PROCEDURE: CT CEREBRAL PERFUSION W WO CONTRAST-, CT ANGIOGRAM NECK-, CT  ANGIOGRAM HEAD W AI ANALYSIS OF LVO-     INDICATIONS: Neuro deficit, acute,  stroke suspected     COMPARISON: No comparisons available.     TECHNIQUE: Routine transaxial cuts were obtained through the head  without administration of contrast. Routine transaxial cuts were then  obtained through the head following the intravenous administration of  115 mL of Isovue 300. Core blood volume, core blood flow, mean transit  time, and Tmax images were obtained utilizing the Rapid software  protocol. A limited CT angiogram of the head was also performed to  measure the blood vessel density.     Contrast-enhanced volumetric CT angiographic imaging of the head and  neck was performed using the IV contrast dose referenced above. 3-D and  color rendered reconstructions were created for interpretation.     The radiation dose reduction device was turned on for each scan per the  ALARA (As Low as Reasonably Achievable) protocol.     FINDINGS:   CT perfusion: There is no evidence of a perfusion abnormality in the  brain. No core infarct or brain at risk.     CTA head and neck:  CT angiography: The aortic arch is normal. There is conventional 3  vessel arch anatomy. The right brachiocephalic and bilateral subclavian  arteries appear widely patent. Bilateral common carotid arteries are  normal. The carotid bifurcations, ECA and distal branches and cervical  internal carotid arteries appear normal. The intracranial ICA segments  appear normal. There is a patent posterior communicating artery on both  sides. There is a patent anterior communicating artery. The A1 and M1  segments and distal LILLIAM and MCA branches appear patent.     There are codominant vertebral arteries. The vertebral arteries follow a  normal course and appear normal caliber throughout the neck and into the  head. The V4 segments appear normal. The basilar artery, superior  cerebellar arteries, P1 segments and distal PCA branches appear widely  patent. Dural venous sinuses appear normal.     Nonvascular findings: Lung apices are clear. Superior  mediastinal  structures appear normal. Thyroid and salivary glands appear normal.  There are no acute intracranial findings. No abnormal extra-axial  collections. Orbits appear within normal limits. The paranasal sinuses  and the mastoid air cells appear well aerated. Dentition is  unremarkable. There is no evidence of a neck mass or adenopathy.        Impression:      CT perfusion: No perfusion abnormalities in the brain.     CTA head and neck. No significant vascular abnormalities in the head or  the neck. No high-grade stenosis, vessel occlusion, aneurysm or  dissection.     This report was finalized on 6/30/2022 1:34 PM by Glenn Jamison MD.       XR Chest 1 View [[id number]] Collected: 06/30/22 1330     Updated: 06/30/22 1337    Narrative:      DATE OF EXAM: 6/30/2022 1:22 PM     PROCEDURE: XR CHEST 1 VW-     INDICATIONS: Acute Stroke Protocol (onset < 12 hrs).      COMPARISON: None available.     TECHNIQUE: Single frontal view of the chest.     FINDINGS:  Normal cardiomediastinal silhouette. There is diffuse interstitial  prominence with prominent interstitial markings in the bilateral lower  lobes. No pleural effusion or pneumothorax. No acute osseous findings.       Impression:      Interstitial prominence, a nonspecific finding which may reflect chronic  interstitial changes although atypical/viral infection or reactive  airways disease could appear similarly. No focal consolidation.     This report was finalized on 6/30/2022 1:34 PM by Gucci Wells MD.       CT Head Without Contrast Stroke Protocol [[id number]] Collected: 06/30/22 1307     Updated: 06/30/22 1316    Narrative:      DATE OF EXAM: 6/30/2022 12:45 PM     PROCEDURE: CT HEAD WO CONTRAST STROKE PROTOCOL-     INDICATIONS: Neuro deficit, acute, stroke suspected     COMPARISON: No comparisons available.     TECHNIQUE: Routine transaxial and coronal reconstruction images were  obtained through the head without the administration of  contrast.  Automated exposure control and iterative reconstruction methods were  used.     The radiation dose reduction device was turned on for each scan per the  ALARA (As Low as Reasonably Achievable) protocol.     FINDINGS:  The calvarium appears intact. Included paranasal sinuses and mastoids  appear clear. Soft tissue window images show no evidence of hemorrhage,  contusion or edema, no evidence of mass or mass effect, acute or old  infarct, hydrocephalus, or abnormal extra-axial collection. No unusual  focal or generalized brain atrophy is seen.        Impression:      No evidence of acute intracranial disease.                 Exam time shown is 12:45 PM. Study was reviewed on the CT scan monitor  and discussed with Dr. Boone at 12:50 PM..     This report was finalized on 6/30/2022 1:11 PM by Dr. Reji Dunbar MD.              PROBLEM LIST    Possible CVA  Right-sided weakness  History of migraine headaches  History of ADHD, depression, anxiety    Assessment & Plan     37-year-old woman, non-smoker with GERD, migraine headaches, ADHD presenting with the abrupt onset of right-sided weakness and difficulty speaking.  Imaging was negative for a large vessel occlusion or bleed.  She was given TNKase at 1305.  On arrival to the ICU her stroke scale was a 5.  Flight crew NIH stroke scale was 8-10.  With negative imaging this could be a complex migraine.  MRI has been ordered.    -Monitor NIH stroke scale  -Speech therapy to assess swallow  -PT, OT tomorrow  -Maintain systolic blood pressure less than 180  -Restart proton pump inhibitor  -Echocardiogram, carotid duplex  -Urine drug screen, pregnancy test  -Clinically I suspect she will need to stop all stimulant medications    I discussed the patients findings and my recommendations with patient    Danya Rosa MD  06/30/22  16:55 EDT    Time: 40min    This note was produced with a voice recognition program and may have uncorrected errors.        Electronically signed by Danya Rosa MD at 22 1702          Emergency Department Notes      Gt Boone MD at 22 1302      Procedure Orders    1. Critical Care [[id number]] ordered by Gt Boone MD               Subjective   Pt presents with right sided weakness.  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  EMS was called and then a helicopter and she was flown here.  Her speech is better but she still has right sided weakness.  She has a history of migraines but has never had this with a migraine and does not currently have a headache.    Clinic gave her 162 mg ASA and flight crew gave her Zofran for nausea.      History provided by:  Patient and EMS personnel      Review of Systems   Constitutional: Negative for fever.   Respiratory: Negative for cough and shortness of breath.    Gastrointestinal: Positive for nausea.   Neurological: Positive for speech difficulty and weakness. Negative for headaches.   All other systems reviewed and are negative.      Past Medical History:   Diagnosis Date   • ADHD (attention deficit hyperactivity disorder)    • Anxiety    • Colitis 2020    11/3/2020 had colonoscopy and recommend repeat in 5 years   • Depression    • Fatty liver    • GERD (gastroesophageal reflux disease)    • Hiatal hernia    • Liver hemangioma 06/15/2022   • Liver hemangioma    • Liver problem    • Migraine headache    • Panic disorder    • Peripheral neuropathy    • Psychiatric illness    • Self-injurious behavior        Allergies   Allergen Reactions   • Latex        Past Surgical History:   Procedure Laterality Date   •  SECTION     • CHOLECYSTECTOMY     • COLONOSCOPY      / BEBETO Reynolds- Normal    • COLONOSCOPY N/A 2016    Procedure: COLONOSCOPY CPT CODE: 41487;  Surgeon: Sherry Muse DO;  Location: [facility] OR;  Service:    • ENDOSCOPY N/A  8/30/2016    Procedure: ESOPHAGOGASTRODUODENOSCOPY WITH BIOPSY CPT CODE: 43542;  Surgeon: Sherry Muse DO;  Location: [facility] OR;  Service:    • GALLBLADDER SURGERY     • NERVE SURGERY      Right arm    • SHOULDER SURGERY     • TONSILLECTOMY     • UPPER GASTROINTESTINAL ENDOSCOPY  2010    Dr. Jeter/ Small hiatal hernia        Family History   Problem Relation Age of Onset   • No Known Problems Mother    • Diabetes Father    • Atrial fibrillation Father    • Hypertension Father    • Hyperlipidemia Father    • Drug abuse Brother    • Anxiety disorder Brother    • Alcohol abuse Brother    • Drug abuse Brother    • Depression Brother    • Heart disease Maternal Grandmother    • Bone cancer Paternal Grandfather    • Diabetes Paternal Grandfather    • Dementia Paternal Grandmother    • Heart disease Paternal Grandmother    • Diabetes Other    • Heart disease Other    • Hypertension Other        Social History     Socioeconomic History   • Marital status:      Spouse name: Davon Goel    • Number of children: 1   • Years of education: [employer]    Tobacco Use   • Smoking status: Never Smoker   • Smokeless tobacco: Never Used   Vaping Use   • Vaping Use: Never used   Substance and Sexual Activity   • Alcohol use: Yes     Comment: very limited socially   • Drug use: No   • Sexual activity: Yes           Objective   Physical Exam  Vitals and nursing note reviewed.   Constitutional:       General: She is not in acute distress.     Appearance: Normal appearance. She is not ill-appearing.   HENT:      Head: Normocephalic and atraumatic.      Mouth/Throat:      Mouth: Mucous membranes are moist.   Eyes:      General: No scleral icterus.        Right eye: No discharge.         Left eye: No discharge.      Conjunctiva/sclera: Conjunctivae normal.   Cardiovascular:      Rate and Rhythm: Normal rate and regular rhythm.      Heart sounds: No murmur heard.  Pulmonary:      Effort: Pulmonary effort is normal.  No respiratory distress.      Breath sounds: Normal breath sounds. No wheezing.   Abdominal:      General: Bowel sounds are normal. There is no distension.      Palpations: Abdomen is soft.      Tenderness: There is no abdominal tenderness. There is no guarding or rebound.   Musculoskeletal:         General: No swelling. Normal range of motion.      Cervical back: Normal range of motion and neck supple.   Skin:     General: Skin is warm and dry.      Findings: No rash.   Neurological:      Mental Status: She is alert and oriented to person, place, and time.      Cranial Nerves: No cranial nerve deficit.      Motor: Weakness present.      Comments: Right arm and leg weakness, leg worse than arm.  Speech easy to understand and communicative.   Psychiatric:         Mood and Affect: Mood normal.         Behavior: Behavior normal.         Thought Content: Thought content normal.         Critical Care  Performed by: Gt Boone MD  Authorized by: Gt Boone MD     Critical care provider statement:     Critical care time (minutes):  35    Critical care time was exclusive of:  Separately billable procedures and treating other patients    Critical care was necessary to treat or prevent imminent or life-threatening deterioration of the following conditions:  CNS failure or compromise    Critical care was time spent personally by me on the following activities:  Evaluation of patient's response to treatment, discussions with consultants, obtaining history from patient or surrogate, development of treatment plan with patient or surrogate, ordering and performing treatments and interventions, ordering and review of laboratory studies, ordering and review of radiographic studies and re-evaluation of patient's condition    I assumed direction of critical care for this patient from another provider in my specialty: no      Care discussed with: admitting provider                ED Course         Seen on arrival,  in CT scanner, with stroke team.  History from flight crew.  Discussed with stroke team, she has disabling symptoms and is within window and no apparent contraindcations.  I do suspect this is more likely a complicated migraine or other stroke mimic but I cannot prove this in the necessary time frame so we agreed on treatment plan.  Stroke NP discussed risk/benefit with pt in my presence and pt consented.    Labs benign.  EKG SR.  No immediate complications. Admitted to ICU.                                  MDM  Number of Diagnoses or Management Options     Amount and/or Complexity of Data Reviewed  Clinical lab tests: ordered and reviewed  Tests in the radiology section of CPT®: ordered and reviewed  Decide to obtain previous medical records or to obtain history from someone other than the patient: yes  Obtain history from someone other than the patient: yes  Discuss the patient with other providers: yes  Independent visualization of images, tracings, or specimens: yes    Critical Care  Total time providing critical care: 30-74 minutes      Final diagnoses:   Acute right-sided weakness   Expressive aphasia       ED Disposition  ED Disposition     ED Disposition   Decision to Admit    Condition   --    Comment   Level of Care: Critical Care [6]   Diagnosis: Acute right-sided weakness [381302]   Admitting Physician: SOWMYA MURILLO [[id number]]   Attending Physician: SOWMYA MURILLO [[id number]]   Certification: I Certify That Inpatient Hospital Services Are Medically Necessary For Greater Than 2 Midnights               No follow-up provider specified.       Medication List      Changed    methylPREDNISolone 4 MG dose pack  Commonly known as: MEDROL  Take as directed on package instructions.  What changed: additional instructions     sulfamethoxazole-trimethoprim 800-160 MG per tablet  Commonly known as: BACTRIM DS,SEPTRA DS  Take 1 tablet by mouth 2 (Two) Times a Day.  What changed: additional instructions         Stop    fluconazole 150 MG tablet  Commonly known as: Diflucan             Gt Boone MD  06/30/22 1948      Electronically signed by Gt Boone MD at 06/30/22 1948         Current Facility-Administered Medications   Medication Dose Route Frequency Provider Last Rate Last Admin   • acetaminophen (TYLENOL) tablet 650 mg  650 mg Oral Q6H PRN Danya Rosa MD   650 mg at 07/01/22 0613   • dextrose (D50W) (25 g/50 mL) IV injection 25 g  25 g Intravenous Q15 Min PRN Leyda Hernandez APRN       • dextrose (GLUTOSE) oral gel 15 g  15 g Oral Q15 Min PRN Leyda Hernandez APRN       • FLUoxetine (PROzac) capsule 60 mg  60 mg Oral Daily Danya Rosa MD   60 mg at 07/01/22 1342   • glucagon (human recombinant) (GLUCAGEN DIAGNOSTIC) injection 1 mg  1 mg Intramuscular Q15 Min PRN Leyda Hernandez APRN       • Insulin Lispro (humaLOG) injection 0-9 Units  0-9 Units Subcutaneous 4x Daily With Meals & Nightly Cinda Robert, JOSE, APRN       • metoprolol tartrate (LOPRESSOR) half tablet 12.5 mg  12.5 mg Oral Q12H Danya Rosa MD   12.5 mg at 07/01/22 1342   • ondansetron (ZOFRAN) injection 4 mg  4 mg Intravenous Q6H PRN Danya Rosa MD       • pantoprazole (PROTONIX) injection 40 mg  40 mg Intravenous Q AM Danya Rosa MD   40 mg at 07/01/22 0613   • potassium chloride (KLOR-CON) packet 40 mEq  40 mEq Oral PRN Leyda Hernandez APRN   40 mEq at 07/01/22 0318   • potassium chloride (MICRO-K) CR capsule 40 mEq  40 mEq Oral PRN Leyda Hernandez APRN       • sodium chloride 0.9 % flush 10 mL  10 mL Intravenous PRN Gt Boone MD       • sodium chloride 0.9 % flush 10 mL  10 mL Intravenous Q12H Sarah Gusman APRN   10 mL at 07/01/22 0900   • sodium chloride 0.9 % flush 10 mL  10 mL Intravenous PRN Sarah Gusman APRN           Lab Results (last 24 hours)     Procedure  Component Value Units Date/Time    CBC (No Diff) [[id number]]  (Normal) Collected: 07/01/22 0826    Specimen: Blood Updated: 07/01/22 0828     WBC 8.74 10*3/mm3      RBC 4.31 10*6/mm3      Hemoglobin 13.1 g/dL      Hematocrit 39.4 %      MCV 91.4 fL      MCH 30.4 pg      MCHC 33.2 g/dL      RDW 13.3 %      RDW-SD 45.3 fl      MPV 9.4 fL      Platelets 311 10*3/mm3     Basic Metabolic Panel [[id number]]  (Abnormal) Collected: 07/01/22 0631    Specimen: Blood Updated: 07/01/22 0736     Glucose 90 mg/dL      BUN 10 mg/dL      Creatinine 0.94 mg/dL      Sodium 134 mmol/L      Potassium 4.8 mmol/L      Chloride 103 mmol/L      CO2 20.0 mmol/L      Calcium 9.7 mg/dL      BUN/Creatinine Ratio 10.6     Anion Gap 11.0 mmol/L      eGFR 81.3 mL/min/1.73      Comment: National Kidney Foundation and American Society of Nephrology (ASN) Task Force recommended calculation based on the Chronic Kidney Disease Epidemiology Collaboration (CKD-EPI) equation refit without adjustment for race.       Narrative:      GFR Normal >60  Chronic Kidney Disease <60  Kidney Failure <15      Lipid Panel [[id number]]  (Abnormal) Collected: 07/01/22 0631    Specimen: Blood Updated: 07/01/22 0736     Total Cholesterol 181 mg/dL      Triglycerides 72 mg/dL      HDL Cholesterol 53 mg/dL      LDL Cholesterol  115 mg/dL      VLDL Cholesterol 13 mg/dL      LDL/HDL Ratio 2.14    Narrative:      Cholesterol Reference Ranges  (U.S. Department of Health and Human Services ATP III Classifications)    Desirable          <200 mg/dL  Borderline High    200-239 mg/dL  High Risk          >240 mg/dL      Triglyceride Reference Ranges  (U.S. Department of Health and Human Services ATP III Classifications)    Normal           <150 mg/dL  Borderline High  150-199 mg/dL  High             200-499 mg/dL  Very High        >500 mg/dL    HDL Reference Ranges  (U.S. Department of Health and Human Services ATP III Classifications)    Low     <40 mg/dl (major risk factor for  CHD)  High    >60 mg/dl ('negative' risk factor for CHD)        LDL Reference Ranges  (U.S. Department of Health and Human Services ATP III Classifications)    Optimal          <100 mg/dL  Near Optimal     100-129 mg/dL  Borderline High  130-159 mg/dL  High             160-189 mg/dL  Very High        >189 mg/dL    Phosphorus [[id number]]  (Normal) Collected: 07/01/22 0631    Specimen: Blood Updated: 07/01/22 0736     Phosphorus 3.2 mg/dL     Magnesium [[id number]]  (Normal) Collected: 07/01/22 0631    Specimen: Blood Updated: 07/01/22 0736     Magnesium 2.4 mg/dL     POC Glucose Once [[id number]]  (Normal) Collected: 06/30/22 2146    Specimen: Blood Updated: 06/30/22 2150     Glucose 102 mg/dL      Comment: Meter: [id number] : [id number] Carmel Humphrey       Urine Drug Screen - Urine, Clean Catch [[id number]]  (Normal) Collected: 06/30/22 1753    Specimen: Urine, Clean Catch Updated: 06/30/22 1805     THC, Screen, Urine Negative     Phencyclidine (PCP), Urine Negative     Cocaine Screen, Urine Negative     Methamphetamine, Ur Negative     Opiate Screen Negative     Amphetamine Screen, Urine Negative     Benzodiazepine Screen, Urine Negative     Tricyclic Antidepressants Screen Negative     Methadone Screen, Urine Negative     Barbiturates Screen, Urine Negative     Oxycodone Screen, Urine Negative     Propoxyphene Screen Negative     Buprenorphine, Screen, Urine Negative    Narrative:      Cutoff For Drugs Screened:    Amphetamines               500 ng/ml  Barbiturates               200 ng/ml  Benzodiazepines            150 ng/ml  Cocaine                    150 ng/ml  Methadone                  200 ng/ml  Opiates                    100 ng/ml  Phencyclidine               25 ng/ml  THC                            50 ng/ml  Methamphetamine            500 ng/ml  Tricyclic Antidepressants  300 ng/ml  Oxycodone                  100 ng/ml  Propoxyphene               300 ng/ml  Buprenorphine               10 ng/ml    The  normal value for all drugs tested is negative. This report includes unconfirmed screening results, with the cutoff values listed, to be used for medical treatment purposes only.  Unconfirmed results must not be used for non-medical purposes such as employment or legal testing.  Clinical consideration should be applied to any drug of abuse test, particularly when unconfirmed results are used.      Pregnancy, Urine - Urine, Clean Catch [[id number]]  (Normal) Collected: 06/30/22 1753    Specimen: Urine, Clean Catch Updated: 06/30/22 1759     HCG, Urine QL Negative    Norris Draw [[id number]] Collected: 06/30/22 1300    Specimen: Blood Updated: 06/30/22 1703    Narrative:      The following orders were created for panel order Norris Draw.  Procedure                               Abnormality         Status                     ---------                               -----------         ------                     Green Top (Gel)[[id number]]                                  Final result               Lavender Top[[id number]]                                     Final result               Gold Top - SST[[id number]]                                   Final result               Bradford Top[[id number]]                                         Final result               Light Blue Top[[id number]]                                   Final result                 Please view results for these tests on the individual orders.    Bradford Top [[id number]] Collected: 06/30/22 1300    Specimen: Blood Updated: 06/30/22 1703     Extra Tube Hold for add-ons.     Comment: Auto resulted.       TSH [[id number]]  (Normal) Collected: 06/30/22 1300    Specimen: Blood Updated: 06/30/22 1632     TSH 3.580 uIU/mL     Hemoglobin A1c [[id number]]  (Normal) Collected: 06/30/22 1300    Specimen: Blood Updated: 06/30/22 1623     Hemoglobin A1C 4.80 %     Narrative:      Hemoglobin A1C Ranges:    Increased Risk for Diabetes  5.7% to 6.4%  Diabetes                     >=  6.5%  Diabetic Goal                < 7.0%        Imaging Results (Last 24 Hours)     Procedure Component Value Units Date/Time    CT Head Without Contrast [[id number]] Collected: 07/01/22 1359     Updated: 07/01/22 1405    Narrative:      CT HEAD WO CONTRAST-     Date of Exam: 7/1/2022 1:50 PM     Indication: Stroke, follow up; R53.1-Weakness; R47.01-Aphasia;  R13.11-Dysphagia, oral phase.     Comparison: CT scan of the head from 06/30/2022; CT perfusion scan  06/30/2022; MRI brain 07/01/2022     Technique:  Without contrast, contiguous axial CT images of the head  were obtained from skull base to vertex.  Coronal and sagittal  reconstructions were performed.  Automated exposure control and  iterative reconstruction methods were used.     FINDINGS  No evidence of intracranial hemorrhage, mass, or midline shift. The  ventricles appear normal in size for the patient's age. No extra-axial  collections identified. The gray white matter differentiation is intact.  No air-fluid levels identified within the paranasal sinuses. The  extra-axial structures demonstrate no acute process.       Impression:      No evidence of hemorrhage, mass effect or midline shift. No acute  process identified.     This report was finalized on 7/1/2022 2:02 PM by Julius Echavarria MD.       MRI Brain Without Contrast [[id number]] Collected: 07/01/22 0717     Updated: 07/01/22 0817    Narrative:      MRI BRAIN WO CONTRAST-     Date of Exam: 7/1/2022 2:03 AM     Indication: Stroke, follow up; R53.1-Weakness; R47.01-Aphasia;  R13.11-Dysphagia, oral phase.     Technique: Routine multiplanar multisequence MR imaging of the brain was  performed without the administration of   gadolinium contrast.     Comparison Exams: CT 06/30/2022     FINDINGS:  There are no areas of restricted diffusion.   There is no evidence of  intracranial hemorrhage. There is no mass, mass effect, or  hydrocephalus. No abnormal extra-axial fluid collections are seen.    Major  intracranial vascular flow voids are preserved. Sella and  suprasellar cistern are within normal limits.   Cranio-vertebral  junction is normal.        Globes and orbits are within normal limits.      Visualized paranasal sinuses and mastoid air cells are clear.       Impression:         1. No acute intracranial abnormality.     This report was finalized on 7/1/2022 8:14 AM by Miky Johnson.           Operative/Procedure Notes (last 48 hours)  Notes from 06/29/22 1526 through 07/01/22 1526   No notes of this type exist for this encounter.          Physician Progress Notes (last 48 hours)      Christian Rondon MD at 07/01/22 1345          Stroke Progress Note       Chief Complaint:   Right leg weakness and speech difficulty.     Subjective    Subjective     Subjective:  No acute events overnight     Review of Systems   Constitutional: No fatigue  HENT: Negative for nosebleeds and rhinorrhea.    Eyes: Negative for redness.   Respiratory: Negative for cough.    Gastrointestinal: Negative for anal bleeding.   Endocrine: Negative for polydipsia.   Genitourinary: Negative for enuresis and urgency.   Musculoskeletal: Negative for joint swelling.   Neurological: Negative for tremors.   Psychiatric/Behavioral: Negative for hallucinations.   Objective      Temp:  [98.1 °F (36.7 °C)-98.4 °F (36.9 °C)] 98.4 °F (36.9 °C)  Heart Rate:  [] 71  Resp:  [16-18] 18  BP: (106-150)/(72-98) 113/74    NEURO    MENTAL STATUS: AAOx3, memory intact, fund of knowledge appropriate    LANG/SPEECH:stammerring and hesitant speech     CRANIAL NERVES:    Pupils equal and reactive, EOMI intact, no gaze deviation, no nystagmus  No facial droop, cough and gag intact, shoulder shrug intact, tongue midline     MOTOR:  Moves all extremities equally- except right leg and arm- effort related weakness.   Arnold positive.    SENSORY: Normal to light touch all throughout     COORDINATION: Normal finger to nose and heel to shin, no tremor,  no dysmetria    STATION: Not assessed due to patient condition    GAIT: Not assessed due to patient condition  Results Review:    I reviewed the patient's new clinical results.    Lab Results (last 24 hours)     Procedure Component Value Units Date/Time    CBC (No Diff) [[id number]]  (Normal) Collected: 07/01/22 0826    Specimen: Blood Updated: 07/01/22 0828     WBC 8.74 10*3/mm3      RBC 4.31 10*6/mm3      Hemoglobin 13.1 g/dL      Hematocrit 39.4 %      MCV 91.4 fL      MCH 30.4 pg      MCHC 33.2 g/dL      RDW 13.3 %      RDW-SD 45.3 fl      MPV 9.4 fL      Platelets 311 10*3/mm3     Basic Metabolic Panel [[id number]]  (Abnormal) Collected: 07/01/22 0631    Specimen: Blood Updated: 07/01/22 0736     Glucose 90 mg/dL      BUN 10 mg/dL      Creatinine 0.94 mg/dL      Sodium 134 mmol/L      Potassium 4.8 mmol/L      Chloride 103 mmol/L      CO2 20.0 mmol/L      Calcium 9.7 mg/dL      BUN/Creatinine Ratio 10.6     Anion Gap 11.0 mmol/L      eGFR 81.3 mL/min/1.73      Comment: National Kidney Foundation and American Society of Nephrology (ASN) Task Force recommended calculation based on the Chronic Kidney Disease Epidemiology Collaboration (CKD-EPI) equation refit without adjustment for race.       Narrative:      GFR Normal >60  Chronic Kidney Disease <60  Kidney Failure <15      Lipid Panel [[id number]]  (Abnormal) Collected: 07/01/22 0631    Specimen: Blood Updated: 07/01/22 0736     Total Cholesterol 181 mg/dL      Triglycerides 72 mg/dL      HDL Cholesterol 53 mg/dL      LDL Cholesterol  115 mg/dL      VLDL Cholesterol 13 mg/dL      LDL/HDL Ratio 2.14    Narrative:      Cholesterol Reference Ranges  (U.S. Department of Health and Human Services ATP III Classifications)    Desirable          <200 mg/dL  Borderline High    200-239 mg/dL  High Risk          >240 mg/dL      Triglyceride Reference Ranges  (U.S. Department of Health and Human Services ATP III Classifications)    Normal           <150 mg/dL  Borderline High   150-199 mg/dL  High             200-499 mg/dL  Very High        >500 mg/dL    HDL Reference Ranges  (U.S. Department of Health and Human Services ATP III Classifications)    Low     <40 mg/dl (major risk factor for CHD)  High    >60 mg/dl ('negative' risk factor for CHD)        LDL Reference Ranges  (U.S. Department of Health and Human Services ATP III Classifications)    Optimal          <100 mg/dL  Near Optimal     100-129 mg/dL  Borderline High  130-159 mg/dL  High             160-189 mg/dL  Very High        >189 mg/dL    Phosphorus [[id number]]  (Normal) Collected: 07/01/22 0631    Specimen: Blood Updated: 07/01/22 0736     Phosphorus 3.2 mg/dL     Magnesium [[id number]]  (Normal) Collected: 07/01/22 0631    Specimen: Blood Updated: 07/01/22 0736     Magnesium 2.4 mg/dL     POC Glucose Once [[id number]]  (Normal) Collected: 06/30/22 2146    Specimen: Blood Updated: 06/30/22 2150     Glucose 102 mg/dL      Comment: Meter: [id number] : [id number] Carmel Humphrey       Urine Drug Screen - Urine, Clean Catch [[id number]]  (Normal) Collected: 06/30/22 1753    Specimen: Urine, Clean Catch Updated: 06/30/22 1805     THC, Screen, Urine Negative     Phencyclidine (PCP), Urine Negative     Cocaine Screen, Urine Negative     Methamphetamine, Ur Negative     Opiate Screen Negative     Amphetamine Screen, Urine Negative     Benzodiazepine Screen, Urine Negative     Tricyclic Antidepressants Screen Negative     Methadone Screen, Urine Negative     Barbiturates Screen, Urine Negative     Oxycodone Screen, Urine Negative     Propoxyphene Screen Negative     Buprenorphine, Screen, Urine Negative    Narrative:      Cutoff For Drugs Screened:    Amphetamines               500 ng/ml  Barbiturates               200 ng/ml  Benzodiazepines            150 ng/ml  Cocaine                    150 ng/ml  Methadone                  200 ng/ml  Opiates                    100 ng/ml  Phencyclidine               25 ng/ml  THC                             50 ng/ml  Methamphetamine            500 ng/ml  Tricyclic Antidepressants  300 ng/ml  Oxycodone                  100 ng/ml  Propoxyphene               300 ng/ml  Buprenorphine               10 ng/ml    The normal value for all drugs tested is negative. This report includes unconfirmed screening results, with the cutoff values listed, to be used for medical treatment purposes only.  Unconfirmed results must not be used for non-medical purposes such as employment or legal testing.  Clinical consideration should be applied to any drug of abuse test, particularly when unconfirmed results are used.      Pregnancy, Urine - Urine, Clean Catch [[id number]]  (Normal) Collected: 06/30/22 1753    Specimen: Urine, Clean Catch Updated: 06/30/22 1759     HCG, Urine QL Negative    Jena Draw [[id number]] Collected: 06/30/22 1300    Specimen: Blood Updated: 06/30/22 1703    Narrative:      The following orders were created for panel order Jena Draw.  Procedure                               Abnormality         Status                     ---------                               -----------         ------                     Green Top (Gel)[[id number]]                                  Final result               Lavender Top[[id number]]                                     Final result               Gold Top - SST[[id number]]                                   Final result               Bradford Top[[id number]]                                         Final result               Light Blue Top[[id number]]                                   Final result                 Please view results for these tests on the individual orders.    Bradford Top [[id number]] Collected: 06/30/22 1300    Specimen: Blood Updated: 06/30/22 1703     Extra Tube Hold for add-ons.     Comment: Auto resulted.       TSH [[id number]]  (Normal) Collected: 06/30/22 1300    Specimen: Blood Updated: 06/30/22 1632     TSH 3.580 uIU/mL     Hemoglobin A1c [[id number]]  (Normal)  Collected: 06/30/22 1300    Specimen: Blood Updated: 06/30/22 1623     Hemoglobin A1C 4.80 %     Narrative:      Hemoglobin A1C Ranges:    Increased Risk for Diabetes  5.7% to 6.4%  Diabetes                     >= 6.5%  Diabetic Goal                < 7.0%    COVID PRE-OP / PRE-PROCEDURE SCREENING ORDER (NO ISOLATION) - Swab, Nasopharynx [[id number]]  (Normal) Collected: 06/30/22 1348    Specimen: Swab from Nasopharynx Updated: 06/30/22 1424    Narrative:      The following orders were created for panel order COVID PRE-OP / PRE-PROCEDURE SCREENING ORDER (NO ISOLATION) - Swab, Nasopharynx.  Procedure                               Abnormality         Status                     ---------                               -----------         ------                     COVID-19 and FLU A/B PCR...[[id number]]  Normal              Final result                 Please view results for these tests on the individual orders.    COVID-19 and FLU A/B PCR - Swab, Nasopharynx [[id number]]  (Normal) Collected: 06/30/22 1348    Specimen: Swab from Nasopharynx Updated: 06/30/22 1424     COVID19 Not Detected     Influenza A PCR Not Detected     Influenza B PCR Not Detected    Narrative:      Fact sheet for providers: https://www.fda.gov/media/283808/download    Fact sheet for patients: https://www.fda.gov/media/931776/download    Test performed by PCR.    Green Top (Gel) [[id number]] Collected: 06/30/22 1300    Specimen: Blood Updated: 06/30/22 1404     Extra Tube Hold for add-ons.     Comment: Auto resulted.       Lavender Top [[id number]] Collected: 06/30/22 1300    Specimen: Blood Updated: 06/30/22 1404     Extra Tube hold for add-on     Comment: Auto resulted       Gold Top - SST [[id number]] Collected: 06/30/22 1300    Specimen: Blood Updated: 06/30/22 1404     Extra Tube Hold for add-ons.     Comment: Auto resulted.       Light Blue Top [[id number]] Collected: 06/30/22 1300    Specimen: Blood Updated: 06/30/22 1404     Extra Tube Hold for  add-ons.     Comment: Auto resulted           CT Angiogram Neck    Result Date: 6/30/2022  CT perfusion: No perfusion abnormalities in the brain.  CTA head and neck. No significant vascular abnormalities in the head or the neck. No high-grade stenosis, vessel occlusion, aneurysm or dissection.  This report was finalized on 6/30/2022 1:34 PM by Glenn Jamison MD.      MRI Brain Without Contrast    Result Date: 7/1/2022   1. No acute intracranial abnormality.  This report was finalized on 7/1/2022 8:14 AM by Miky Johnson.      XR Chest 1 View    Result Date: 6/30/2022  Interstitial prominence, a nonspecific finding which may reflect chronic interstitial changes although atypical/viral infection or reactive airways disease could appear similarly. No focal consolidation.  This report was finalized on 6/30/2022 1:34 PM by Gucci Wells MD.      CT Head Without Contrast Stroke Protocol    Result Date: 6/30/2022  No evidence of acute intracranial disease.      Exam time shown is 12:45 PM. Study was reviewed on the CT scan monitor and discussed with Dr. Boone at 12:50 PM..  This report was finalized on 6/30/2022 1:11 PM by Dr. Reji Dunbar MD.      CT Angiogram Head w AI Analysis of LVO    Result Date: 6/30/2022  CT perfusion: No perfusion abnormalities in the brain.  CTA head and neck. No significant vascular abnormalities in the head or the neck. No high-grade stenosis, vessel occlusion, aneurysm or dissection.  This report was finalized on 6/30/2022 1:34 PM by Glenn Jamison MD.      CT CEREBRAL PERFUSION WITH & WITHOUT CONTRAST    Result Date: 6/30/2022  CT perfusion: No perfusion abnormalities in the brain.  CTA head and neck. No significant vascular abnormalities in the head or the neck. No high-grade stenosis, vessel occlusion, aneurysm or dissection.  This report was finalized on 6/30/2022 1:34 PM by Glenn Jamison MD.      Results for orders placed during the hospital encounter of 06/30/22    Adult  Transthoracic Echo Complete W/ Cont if Necessary Per Protocol (With Agitated Saline)    Interpretation Summary  · Left ventricular ejection fraction appears to be 61 - 65%. Left ventricular systolic function is normal.  · Left ventricular diastolic function was normal.  · Saline test results are negative for right to left atrial level shunt.  · Normal cardiac valves.           Assessment/Plan     Assessment/Plan:  This is 37-year-old right-handed white female with active medical problems of anxiety depression panic disorder, migraine presented with an acute onset of right-sided weakness.  Patient was given IV TNKase with the presentation.       On my exam, patient is having poor effort in the right lower extremity, inconsistent exam of the right upper extremity.  Likely this is effort related.  Family and the patient states that patient suffers with migraine, but this is atypical.       Acute stroke imaging included CT head, CTA head and neck, CT perfusion, which did not reveal any acute abnormality. MRI brain did not evidence any acute infarct.      Based on history, exam, diagnostics less likely with ischemic vascular event of the brain. Her reflexes are normal, with no back pain, and bowel bladder issues. I do not think MRI L spine is indicated at this time, given her symptoms ( stammering, right arm and right leg weakness) which does not localize to lumbar spine.  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     Plan  -discontinue aspirin and Lipitor  -stable to be transferred to the floor.   -Pt/OT/SPeech can continue to work with the patient.       Christian Rondon MD  07/01/22  13:45 EDT        Electronically signed by Christian Rondon MD at 07/01/22 1350     Danya Rosa MD at 07/01/22 1157          Critical Care Note     LOS: 1 day   Patient Care Team:  Cinda Alvarado APRN as PCP - General (Cardiology)  Brandy Santos, ANJEL as Ambulatory  (Population Health)    Chief  "Complaint/Reason for visit:    Chief Complaint   Patient presents with   • Stroke   Right-sided weakness  History of migraine headaches    Subjective     Patient is a 37 y.o. female presents with PMH migraines, GERD, hiatal hernia, anxiety, depression and ADHD (on Concerta) who presented to the ED today for stroke-like symptoms.  She was in her usual state of health this morning.  She did have a headache and took Excedrin.  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  She was given 162 mg ASA at the clinic.  EMS was called and then they called for the flight crew who gave her zofran for nausea en route.  In the ED, NIHSS was initially 8 and worsened to 10.  CTH was negative.  CTA H/N and CTP was unremarkable.  She was given TNK at 1305.   Per records, she is followed by [facility] Avalos in psychiatry for anxiety, depression and ADHD.  Ritalin was changed to Concerta recently and Prozac increased.     Interval History:     She continues to have difficulty with speech and right leg weakness.  Numbness has improved.  She denies headache.    Review of Systems:    All systems were reviewed and negative except as noted in subjective.    Medical history, surgical history, social history, family history reviewed    Objective     Intake/Output:    Intake/Output Summary (Last 24 hours) at 7/1/2022 1158  Last data filed at 7/1/2022 0600  Gross per 24 hour   Intake 600 ml   Output 2100 ml   Net -1500 ml       Nutrition:  Diet Soft Texture; Whole Foods; Thin    Infusions:         Telemetry: Sinus rhythm             Vital Signs  Blood pressure 120/90, pulse 84, temperature 98.4 °F (36.9 °C), temperature source Oral, resp. rate 18, height 162.6 cm (64.02\"), weight 74.8 kg (164 lb 14.5 oz), SpO2 98 %.    Physical Exam:  General Appearance:   Well developed young woman in no acute distress   Head:   Atraumatic   Eyes:        "   Conjunctiva pink, no jaundice   Ears:     Throat:  Oral mucosa moist   Neck:  Trachea midline, no carotid bruit   Back:      Lungs:    Symmetric chest expansion.  Breath sounds are bilateral without wheeze or rhonchi    Heart:   Regular rhythm, S1, S2 auscultated   Abdomen:    Nondistended, bowel sounds present, soft, nontender   Rectal:   Deferred   Extremities:  No pretibial edema or cyanosis   Pulses:  Palpable pedal pulses   Skin:  Warm and dry without rash   Lymph nodes:  No cervical adenopathy   Neurologic:  Face is symmetric, tongue is midline.  Speech is stuttering.  No motor drift upper extremities.  Today she is able to lift her right leg off of the bed but it drifts.  Left leg without drift.    Interval:  (AM shift change)  1a. Level of Consciousness: 0-->Alert, keenly responsive  1b. LOC Questions: 0-->Answers both questions correctly  1c. LOC Commands: 0-->Performs both tasks correctly  2. Best Gaze: 0-->Normal  3. Visual: 0-->No visual loss  4. Facial Palsy: 0-->Normal symmetrical movements  5a. Motor Arm, Left: 0-->No drift, limb holds 90 (or 45) degrees for full 10 secs  5b. Motor Arm, Right: 0-->No drift, limb holds 90 (or 45) degrees for full 10 secs  6a. Motor Leg, Left: 0-->No drift, leg holds 30 degree position for full 5 secs  6b. Motor Leg, Right: 2-->Some effort against gravity, leg falls to bed by 5 secs, but has some effort against gravity  7. Limb Ataxia: 0-->Absent  8. Sensory: 1-->Mild-to-moderate sensory loss, patient feels pinprick is less sharp or is dull on the affected side, or there is a loss of superficial pain with pinprick, but patient is aware of being touched  9. Best Language: 1-->Mild-to-moderate aphasia, some obvious loss of fluency or facility of comprehension, without significant limitation on ideas expressed or form of expression. Reduction of speech and/or comprehension, however, makes conversation. . . (see row details) (Stuttering present)  10. Dysarthria:  0-->Normal  11. Extinction and Inattention (formerly Neglect): 0-->No abnormality    Total (NIH Stroke Scale): 4      Results Review:     I reviewed the patient's new clinical results.   Results from last 7 days   Lab Units 07/01/22  0631 06/30/22  1300 06/30/22  1255   SODIUM mmol/L 134*  --   --    POTASSIUM mmol/L 4.8  --   --    CHLORIDE mmol/L 103  --   --    CO2 mmol/L 20.0*  --   --    BUN mg/dL 10  --   --    CREATININE mg/dL 0.94  --  0.90   CALCIUM mg/dL 9.7  --   --    ALT (SGPT) U/L  --  11  --    AST (SGOT) U/L  --  17  --    GLUCOSE mg/dL 90  --   --      Results from last 7 days   Lab Units 07/01/22  0826 06/30/22  1300 06/30/22  1255   WBC 10*3/mm3 8.74 10.54  --    HEMOGLOBIN g/dL 13.1 12.7  --    HEMOGLOBIN, POC g/dL  --   --  13.6   HEMATOCRIT % 39.4 37.0  --    HEMATOCRIT POC %  --   --  40   PLATELETS 10*3/mm3 311 329  --          No results found for: BLOODCX  Lab Results   Component Value Date    URINECX <10,000 CFU/mL Normal Urogenital Madiosn 08/16/2016       I reviewed the patient's new imaging including images and reports.    Preliminary echocardiogram revealed an EF of 63%, no PFO, no valvular disease    FINDINGS:  There are no areas of restricted diffusion.   There is no evidence of  intracranial hemorrhage. There is no mass, mass effect, or  hydrocephalus. No abnormal extra-axial fluid collections are seen.    Major intracranial vascular flow voids are preserved. Sella and  suprasellar cistern are within normal limits.   Cranio-vertebral  junction is normal.        Globes and orbits are within normal limits.      Visualized paranasal sinuses and mastoid air cells are clear.     IMPRESSION:     1. No acute intracranial abnormality.     This report was finalized on 7/1/2022 8:14 AM by Miky Johnson.     CTA of the head and neck, CT perfusion were without abnormalities      All medications reviewed.   aspirin, 325 mg, Oral, Daily   Or  aspirin, 300 mg, Rectal, Daily  atorvastatin, 80 mg,  Oral, Nightly  insulin lispro, 0-9 Units, Subcutaneous, 4x Daily With Meals & Nightly  pantoprazole, 40 mg, Intravenous, Q AM  sodium chloride, 10 mL, Intravenous, Q12H          Assessment & Plan       Suspected cerebrovascular accident (CVA)    Acute right-sided weakness    Anxiety    Depression    Gastroesophageal reflux disease without esophagitis    Hiatal hernia    ADHD    Migraine headache    37-year-old woman with suspected stroke who received TNKase.  MRI reveals no stroke.  She has some persistent right lower extremity weakness.  He has no history of lumbar disc disease.  She does have a history of migraine headaches.  She also takes amphetamines for attention deficit hyperactivity disorder.  However her urine drug screen showed no amphetamines.  She also was not pregnant.  Physical therapy assessed this morning and noted that she ambulated 3 feet with a rolling walker and minimal assist of 2.  She demonstrated nonweightbearing on the right lower extremity secondary to pain.    PLAN:    Will discuss with neurology whether we should image her lumbar spine  With no stroke on her MRI I wonder if we should stop her aspirin and Lipitor but again will defer to neurology  CT of the head 24 hours after TNKase, 1 PM and then likely transfer to the floor  Continue PT, OT  She should likely never receive amphetamines again and stop Concerta  Restart her Prozac, metoprolol        Danya DELGADO. MD Moe  07/01/22  11:58 EDT    Addendum discussed with neurology and they do not feel an MRI or CT of the spine would be helpful.    Time:20min    Electronically signed by Danya Rosa MD at 07/01/22 1245          Consult Notes (last 48 hours)      Fabby Ochoa, J[facility] at 06/30/22 1322      Consult Orders    1. Inpatient Neurology Consult Stroke [[id number]] ordered by Gt Boone MD at 06/30/22 1244          Attestation signed by Christian Rondon MD at 06/30/22 6228    I have reviewed this  documentation and agree.I had face-to-face interaction with the patient, took relevant history examined the patient at bedside personally in the emergency room. My findings are as follows:       This is 37-year-old right-handed white female with active medical problems of anxiety depression panic disorder, migraine presented with an acute onset of right-sided weakness.  Patient was given IV TNKase with the presentation.    On my exam, patient is having poor effort in the right lower extremity, inconsistent exam of the right upper extremity.  Likely this is effort related.  Family and the patient states that patient suffers with migraine, but this is atypical.    Acute stroke imaging included CT head, CTA head and neck, CT perfusion, which did not reveal any acute abnormality.    Based on history, exam, diagnostics less likely with ischemic vascular event of the brain.  Likely this could be migraine with aura versus functional neurological disorder.    I expect, patient can be discharged tomorrow after 24 hours of observation in the ICU.    Neurology will continue to follow the patient.                Stroke Consult Note    Patient Name: Diallo Spence   MRN: [id number]  Age: 37 y.o.  Sex: female  : 1985    Primary Care Physician: Cinda Alvarado APRN  Referring Physician:  Dr. Boone     TIME PATIENT SEEN: on arrival on flight deck     Handedness: right  Race:      Chief Complaint/Reason for Consultation:  Acute onset right sided weakness, right facial droop and word finding difficulty     HPI:   Diallo Spence is a 37 year old female with known medical diagnoses of anxiety, depression, panic disorder, migraine and GERD. 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 She reports that symptoms started very suddenly at 1100 while at work. Her NIH was 8 initially but on re-exam it worsened to 10. CT  head w/o negative for acute intracranial abnormality. Discussed TNK with patient with ED provider at bedside. She reports she feels her symptoms are disabling and she would like to proceed after call her parents. Reviewed risks and benefits, contraindications. She has no contraindications. TNK given 1305. Obtained CTA head and neck negative for large vessel occlusion or flow limiting stenosis. CTP negative for area of reversible ischemia. She will be admitted to the neuro ICU for close monitoring.     Of note, patient does report history of frequent migraine. She denies current headache. She also reports she has never had an episode like this before with her migraines. On review of her chart she also has a significant psychiatric history. She was seen by Kelly CAMARA on 6/3/22 and at that time patient was having a strange constellation of symptoms. She reported worsening depression, anxiety. She has been doing things that are unusual such as mopping with no  and spraying deodorant in her hair. Patient states sleep is getting worse and sleeping more through the day. Patient reports still having same nightmares and having night sweats. At that time her Ritalin was discontinued and starting on Concerta. Her Prozac was increased.     Last Known Normal Date/Time: 1100 EST     Review of Systems   Unable to perform ROS: Other      Past Medical History:   Diagnosis Date   • ADHD (attention deficit hyperactivity disorder)    • Anxiety    • Colitis 2020    11/3/2020 had colonoscopy and recommend repeat in 5 years   • Depression    • Fatty liver    • GERD (gastroesophageal reflux disease)    • Hiatal hernia    • Liver hemangioma 06/15/2022   • Liver hemangioma    • Liver problem    • Migraine headache    • Panic disorder    • Peripheral neuropathy    • Psychiatric illness    • Self-injurious behavior      Past Surgical History:   Procedure Laterality Date   •  SECTION     • CHOLECYSTECTOMY     •  COLONOSCOPY  2008    / BEBETO Reynolds- Normal    • COLONOSCOPY N/A 8/30/2016    Procedure: COLONOSCOPY CPT CODE: 42600;  Surgeon: Sherry Muse DO;  Location:  [facility] OR;  Service:    • ENDOSCOPY N/A 8/30/2016    Procedure: ESOPHAGOGASTRODUODENOSCOPY WITH BIOPSY CPT CODE: 60270;  Surgeon: Sherry Muse DO;  Location:  [facility] OR;  Service:    • GALLBLADDER SURGERY     • NERVE SURGERY      Right arm    • SHOULDER SURGERY     • TONSILLECTOMY     • UPPER GASTROINTESTINAL ENDOSCOPY  2010    Dr. Jeter/ Small hiatal hernia      Family History   Problem Relation Age of Onset   • No Known Problems Mother    • Diabetes Father    • Atrial fibrillation Father    • Hypertension Father    • Hyperlipidemia Father    • Drug abuse Brother    • Anxiety disorder Brother    • Alcohol abuse Brother    • Drug abuse Brother    • Depression Brother    • Heart disease Maternal Grandmother    • Bone cancer Paternal Grandfather    • Diabetes Paternal Grandfather    • Dementia Paternal Grandmother    • Heart disease Paternal Grandmother    • Diabetes Other    • Heart disease Other    • Hypertension Other      Social History     Socioeconomic History   • Marital status:      Spouse name: Davon Goel    • Number of children: 1   • Years of education: [employer]    Tobacco Use   • Smoking status: Never Smoker   • Smokeless tobacco: Never Used   Vaping Use   • Vaping Use: Never used   Substance and Sexual Activity   • Alcohol use: Yes     Comment: very limited socially   • Drug use: No   • Sexual activity: Yes     Allergies   Allergen Reactions   • Latex      Prior to Admission medications    Medication Sig Start Date End Date Taking? Authorizing Provider   Cholecalciferol (Vitamin D3) 25 MCG (1000 UT) capsule Take 1 capsule by mouth Daily. 4/12/22   Cinda Alvarado APRN   fluconazole (Diflucan) 150 MG tablet Take 1 tablet by mouth now and 1 tablet at end of antibiotics. 6/28/22 6/29/22  Cinda Alvarado,  JAX   FLUoxetine (PROzac) 20 MG capsule Take 3 capsules by mouth Daily. 6/3/22   Kelly Nix APRN   methylphenidate (Concerta) 18 MG CR tablet Take 1 tablet by mouth Daily 6/3/22 6/3/23  Kelly Nix APRN   methylPREDNISolone (MEDROL) 4 MG dose pack Take as directed on package instructions. 6/28/22   Cinda Alvarado APRN   metoprolol succinate XL (TOPROL-XL) 25 MG 24 hr tablet Take 1/2 tablet by mouth Daily. 4/12/22   Cinda Alvarado APRN   pantoprazole (Protonix) 40 MG EC tablet Take 1 tablet by mouth Daily. 2/3/22   Cinda Alvarado APRN   sulfamethoxazole-trimethoprim (BACTRIM DS,SEPTRA DS) 800-160 MG per tablet Take 1 tablet by mouth 2 (Two) Times a Day. 6/28/22   Cinda Alvarado APRN   SUMAtriptan (Imitrex) 25 MG tablet Take 1 tablet by mouth 1 Time As Needed for Migraine. Take 1 tablet at onset of headache. May repeat dose 1 time in 2 hours if headache not relieved. 4/27/22   Cinad Alvarado APRN         Temp:  [98.5 °F (36.9 °C)] 98.5 °F (36.9 °C)  Heart Rate:  [98] 98  Resp:  [20] 20  BP: (136)/(80) 136/80  Neurological Exam  Mental Status  Awake and alert. Oriented to person, place, time and situation. Orientation: Slow to respond. Oriented to person, place, and time. Speech is normal. Expressive aphasia present. Attention and concentration are normal. Fund of knowledge is appropriate for level of education.    Cranial Nerves  CN II: Visual fields full to confrontation.  CN III, IV, VI: Extraocular movements intact bilaterally. Normal lids and orbits bilaterally. Pupils equal round and reactive to light bilaterally.  CN V:  Right: Diminished sensation of the entire right side of the face.  Left: Facial sensation is normal on the left.  CN VII:  Right: There is central facial weakness.  Left: There is no facial weakness.  CN VIII: Hearing is normal to speech .  CN XI: Shoulder shrug strength is normal.  CN XII: Tongue midline without atrophy or fasciculations.    Motor  Normal muscle  bulk throughout. No fasciculations present. Normal muscle tone. Strength is 5/5 in all four extremities except as noted.  RUE 3/5. RLE 1/5.    Sensory  Light touch abnormality: Diminished right cheek, arm and leg .     Reflexes                                            Right                      Left  Plantar                           Downgoing                Downgoing    Coordination  Right: Finger-to-nose abnormality:Left: Finger-to-nose normal.    Gait    Not assessed .      Physical Exam  Vitals and nursing note reviewed.   Constitutional:       General: She is awake. She is not in acute distress.     Appearance: Normal appearance. She is normal weight. She is not ill-appearing.   HENT:      Head: Normocephalic and atraumatic.      Nose: Nose normal.      Mouth/Throat:      Mouth: Mucous membranes are moist.   Eyes:      General: Lids are normal.      Extraocular Movements: Extraocular movements intact.      Pupils: Pupils are equal, round, and reactive to light.   Cardiovascular:      Rate and Rhythm: Normal rate and regular rhythm.      Pulses: Normal pulses.   Pulmonary:      Effort: Pulmonary effort is normal. No respiratory distress.   Skin:     General: Skin is warm and dry.   Neurological:      Mental Status: She is alert and oriented to person, place, and time.      Cranial Nerves: Cranial nerve deficit present.      Sensory: Sensory deficit present.      Motor: Weakness present.      Coordination: Coordination abnormal.   Psychiatric:         Speech: Speech normal.      Comments: anxious         Acute Stroke Data    Thrombolytic Inclusion / Exclusion Criteria    Time: 13:22 EDT  Person Administering Scale: JAX George    Inclusion Criteria  [x]   18 years of age or greater   [x]   Onset of symptoms < 4.5 hours before beginning treatment (stroke onset = time patient was last seen well or without symptoms).   [x]   Diagnosis of acute ischemic stroke causing measurable disabling deficit  (Complete Hemianopia, Any Aphasia, Visual or Sensory Extinction, Any weakness limiting sustained effort against gravity)   [x]   Any remaining deficit considered potentially disabling in view of patient and practitioner   Exclusion criteria (Do not proceed with Alteplase if any are checked under exclusion criteria)  []   Onset unknown or GREATER than 4.5 hours   []   ICH on CT/MRI   []   CT demonstrates hypodensity representing acute or subacute infarct   []   Significant head trauma or prior stroke in the previous 3 months   []   Symptoms suggestive of subarachnoid hemorrhage   []   History of un-ruptured intracranial aneurysm GREATER than 10 mm   []   Recent intracranial or intraspinal surgery within the last 3 months   []   Arterial puncture at a non-compressible site in the previous 7 days   []   Active internal bleeding   []   Acute bleeding tendency   []   Platelet count LESS than 100,000 for known hematological diseases such as leukemia, thrombocytopenia or chronic cirrhosis   []   Current use of anticoagulant with INR GREATER than 1.7 or PT GREATER than 15 seconds, aPTT GREATER than 40 seconds   []   Heparin received within 48 hours, resulting in abnormally elevated aPTT GREATER than upper limit of normal   []   Current use of direct thrombin inhibitors or direct factor Xa inhibitors in the past 48 hours   []   Elevated blood pressure refractory to treatment (systolic GREATER than 185 mm/Hg or diastolic  GREATER than 110 mm/Hg   []   Suspected infective endocarditis and aortic arch dissection   []   Current use of therapeutic treatment dose of low-molecular-weight heparin (LMWH) within the previous 24 hours   []   Structural GI malignancy or bleed   Relative exclusion for all patients  []   Only minor non-disabling symptoms   []   Pregnancy   []   Seizure at onset with postictal residual neurological impairments   []   Major surgery or previous trauma within past 14 days   []   History of previous  spontaneous ICH, intracranial neoplasm, or AV malformation   []   Postpartum (within previous 14 days)   []   Recent GI or urinary tract hemorrhage (within previous 21 days)   []   Recent acute MI (within previous 3 months)   []   History of un-ruptured intracranial aneurysm LESS than 10 mm   []   History of ruptured intracranial aneurysm   []   Blood glucose LESS than 50 mg/dL (2.7 mmol/L)   []   Dural puncture within the last 7 days   []   Known GREATER than 10 cerebral microbleeds   Additional exclusions for patients with symptoms onset between 3 and 4.5 hours.  []   Age > 80.   []   On any anticoagulants regardless of INR  >>> Warfarin (Coumadin), Heparin, Enoxaparin (Lovenox), fondaparinux (Arixtra), bivalirudin (Angiomax), Argatroban, dabigatran (Pradaxa), rivaroxaban (Xarelto), or apixaban (Eliquis)   []   Severe stroke (NIHSS > 25).   []   History of BOTH diabetes and previous ischemic stroke.   [x]   The risks and benefits have been discussed with the patient or family related to the administration of IV thrombolytic therapy for stroke symptoms.   [x]   I have discussed and reviewed the patient's case and imaging with the attending prior to IV thrombolytic therapy.   1305 Time IV thrombolytic administered       Hospital Meds:  Scheduled-    Infusions-     PRNs- •  sodium chloride    Functional Status Prior to Current Stroke/[place] Score: 0    NIH Stroke Scale  Time: 13:22 EDT  Person Administering Scale: JAX George    Interval: baseline  1a. Level of Consciousness: 0-->Alert, keenly responsive  1b. LOC Questions: 0-->Answers both questions correctly  1c. LOC Commands: 0-->Performs both tasks correctly  2. Best Gaze: 0-->Normal  3. Visual: 0-->No visual loss  4. Facial Palsy: 1-->Minor paralysis (flattened nasolabial fold, asymmetry on smiling)  5a. Motor Arm, Left: 0-->No drift, limb holds 90 (or 45) degrees for full 10 secs  5b. Motor Arm, Right: 3-->No effort against gravity, limb falls  6a.  Motor Leg, Left: 0-->No drift, leg holds 30 degree position for full 5 secs  6b. Motor Leg, Right: 3-->No effort against gravity, leg falls to bed immediately  7. Limb Ataxia: 1-->Present in one limb  8. Sensory: 1-->Mild-to-moderate sensory loss, patient feels pinprick is less sharp or is dull on the affected side, or there is a loss of superficial pain with pinprick, but patient is aware of being touched  9. Best Language: 1-->Mild-to-moderate aphasia, some obvious loss of fluency or facility of comprehension, without significant limitation on ideas expressed or form of expression. Reduction of speech and/or comprehension, however, makes conversation. . . (see row details)  10. Dysarthria: 0-->Normal  11. Extinction and Inattention (formerly Neglect): 0-->No abnormality    Total (NIH Stroke Scale): 10      Results Reviewed:  I have personally reviewed current lab, radiology, and data and agree with results.    Results for orders placed during the hospital encounter of 02/11/22    Adult Transthoracic Echo Complete W/ Cont if Necessary Per Protocol    Interpretation Summary  · Normal left ventricular cavity size and wall thickness noted.  · Left ventricular ejection fraction appears to be 66 - 70%.  · Left ventricular diastolic function was normal.  · Aortic valve is a trileaflet valve, there is no evidence of aortic stenosis or aortic regurgitation.  · Mitral valve echo shows some calcification of chordae, no mitral stenosis or regurgitation detected.  · Tricuspid valve echo is normal no significant tricuspid regurgitation noted. No evidence of pulmonary hypertension.  · No pulmonic stenosis or regurgitation detected.  · Is no evidence of pericardial effusion.       Assessment/Plan:    This is a 37 year old female with known medical diagnoses of anxiety, depression, panic disorder, migraine and GERD. She presents to [facility] ED as a scene flight via [facility] with acute onset word finding difficulty, right facial droop and  right sided weakness. She reports that symptoms started very suddenly at 1100 while at work. Her NIH was 8 initially but on re-exam it worsened to 10. She has no contraindications for TNK and this was administered at 1305.     Antiplatelet PTA: none   Anticoagulant PTA: none        1. Right sided weakness, wording finding difficulty. Possible left hemispheric stroke s/p TNK.  -initiate TIA/CVA s/p thrombolytic therapy order set   -CTH, CTA head and neck and CTP reviewed and negative for acute intracranial abnormality   -24 hr CT head tomorrow at 1300  -MRI brain w/o ordered  -TTE, fasting lipid panel, HgbA1C  -ASA tomorrow after stable CT head   -recommend lipitor 80 mg   -PT/OT/SLP consulted   -NPO until passes dysphagia screening   -SBP <180    2.anxiety, depression  -continue home medications      Discussed plan of care with patient, her parents over phone, ED provider and Dr. Rondon. Stroke neurology will continue to follow this patient. Thank you for this consult.     Fabby Ochoa, APRN  June 30, 2022  13:22 EDT    Electronically signed by Christian Rondon MD at 06/30/22 2282

## 2022-07-02 ENCOUNTER — APPOINTMENT (OUTPATIENT)
Dept: MRI IMAGING | Facility: HOSPITAL | Age: 37
End: 2022-07-02

## 2022-07-02 VITALS
WEIGHT: 164.9 LBS | HEART RATE: 74 BPM | DIASTOLIC BLOOD PRESSURE: 82 MMHG | HEIGHT: 64 IN | BODY MASS INDEX: 28.15 KG/M2 | SYSTOLIC BLOOD PRESSURE: 124 MMHG | RESPIRATION RATE: 18 BRPM | OXYGEN SATURATION: 98 % | TEMPERATURE: 97.8 F

## 2022-07-02 LAB
GLUCOSE BLDC GLUCOMTR-MCNC: 105 MG/DL (ref 70–130)
GLUCOSE BLDC GLUCOMTR-MCNC: 73 MG/DL (ref 70–130)
GLUCOSE BLDC GLUCOMTR-MCNC: 95 MG/DL (ref 70–130)

## 2022-07-02 PROCEDURE — 99239 HOSP IP/OBS DSCHRG MGMT >30: CPT | Performed by: INTERNAL MEDICINE

## 2022-07-02 PROCEDURE — 82962 GLUCOSE BLOOD TEST: CPT

## 2022-07-02 PROCEDURE — 72156 MRI NECK SPINE W/O & W/DYE: CPT

## 2022-07-02 PROCEDURE — A9577 INJ MULTIHANCE: HCPCS | Performed by: INTERNAL MEDICINE

## 2022-07-02 PROCEDURE — 0 GADOBENATE DIMEGLUMINE 529 MG/ML SOLUTION: Performed by: INTERNAL MEDICINE

## 2022-07-02 PROCEDURE — 99233 SBSQ HOSP IP/OBS HIGH 50: CPT | Performed by: STUDENT IN AN ORGANIZED HEALTH CARE EDUCATION/TRAINING PROGRAM

## 2022-07-02 PROCEDURE — 72157 MRI CHEST SPINE W/O & W/DYE: CPT

## 2022-07-02 PROCEDURE — 72158 MRI LUMBAR SPINE W/O & W/DYE: CPT

## 2022-07-02 RX ORDER — TOPIRAMATE 25 MG/1
50 CAPSULE, COATED PELLETS ORAL ONCE
Status: COMPLETED | OUTPATIENT
Start: 2022-07-02 | End: 2022-07-02

## 2022-07-02 RX ORDER — MAGNESIUM OXIDE 400 MG/1
400 TABLET ORAL 2 TIMES DAILY
Qty: 60 TABLET | Refills: 2 | Status: SHIPPED | OUTPATIENT
Start: 2022-07-02 | End: 2022-08-01

## 2022-07-02 RX ORDER — ALPRAZOLAM 1 MG/1
1 TABLET ORAL 3 TIMES DAILY PRN
Status: DISCONTINUED | OUTPATIENT
Start: 2022-07-02 | End: 2022-07-02 | Stop reason: HOSPADM

## 2022-07-02 RX ORDER — SUMATRIPTAN 50 MG/1
50 TABLET, FILM COATED ORAL
Status: DISCONTINUED | OUTPATIENT
Start: 2022-07-02 | End: 2022-07-02 | Stop reason: HOSPADM

## 2022-07-02 RX ORDER — OXYCODONE HYDROCHLORIDE AND ACETAMINOPHEN 5; 325 MG/1; MG/1
1 TABLET ORAL EVERY 6 HOURS PRN
Qty: 30 TABLET | Refills: 0 | Status: SHIPPED | OUTPATIENT
Start: 2022-07-02 | End: 2022-09-28

## 2022-07-02 RX ORDER — RIBOFLAVIN (VITAMIN B2) 400 MG
400 TABLET ORAL
Qty: 30 TABLET | Refills: 3 | Status: SHIPPED | OUTPATIENT
Start: 2022-07-02

## 2022-07-02 RX ADMIN — Medication 10 ML: at 10:03

## 2022-07-02 RX ADMIN — TOPIRAMATE 50 MG: 25 CAPSULE, COATED PELLETS ORAL at 11:39

## 2022-07-02 RX ADMIN — FLUOXETINE HYDROCHLORIDE 60 MG: 20 CAPSULE ORAL at 10:03

## 2022-07-02 RX ADMIN — ACETAMINOPHEN 650 MG: 325 TABLET ORAL at 06:39

## 2022-07-02 RX ADMIN — ALPRAZOLAM 1 MG: 1 TABLET ORAL at 12:27

## 2022-07-02 RX ADMIN — SUMATRIPTAN SUCCINATE 50 MG: 50 TABLET ORAL at 11:39

## 2022-07-02 RX ADMIN — GADOBENATE DIMEGLUMINE 13 ML: 529 INJECTION, SOLUTION INTRAVENOUS at 14:13

## 2022-07-02 RX ADMIN — PANTOPRAZOLE SODIUM 40 MG: 40 INJECTION, POWDER, FOR SOLUTION INTRAVENOUS at 06:39

## 2022-07-02 RX ADMIN — SUMATRIPTAN SUCCINATE 50 MG: 50 TABLET ORAL at 16:58

## 2022-07-02 NOTE — CASE MANAGEMENT/SOCIAL WORK
Discharge Planning Assessment  [facility]     Patient Name: Diallo Spence  MRN: [id number]  Today's Date: 7/2/2022    Admit Date: 6/30/2022     Discharge Needs Assessment    No documentation.                Discharge Plan     Row Name 07/02/22 1607       Plan    Plan Home    Patient/Family in Agreement with Plan yes    Plan Comments Received a call from Dr Sneed. He wanted me to order a w/c, BSC, and RW for Ms Spence. Orders placed in Epic. Called and spoke with Nicholas with [employer] and he will deliver these items to the room prior to DC. Went and spoke with Ms Spence to explain about the ordered equipment and the need for HHPT. Plan remains for home & her parents will transport.    1626: Ms Spence is agreeable to outpatient PT at [facility] instead of . Ambulatory order for PT placed in Epic. Her parents will transport to therapy.      Final Discharge Disposition Code 01 - home or self-care    Row Name 07/02/22 1334       Plan    Plan Home    Patient/Family in Agreement with Plan yes    Plan Comments Received word from JUANCARLOS Davis, that per Dr Sneed, he would like for Ms Spence to go home with HHPT. Unable to arrange this today due to patient has commercial [place] and [place]. It's very unlikely that insurance will approve and there will be difficulty in finding a  agency that accepts [place] in the [facility] area. However, if she is DC over the holiday weekend or on July 4, then CM will follow-up and make referrals. [facility] can also try for [facility]. Went to visit with her at the bedside to relay this information, and she was off the unit for a MRI.    Final Discharge Disposition Code 01 - home or self-care              Continued Care and Services - Admitted Since 6/30/2022     Durable Medical Equipment Coordination complete.    Service Provider Request Status Selected Services Address Phone Fax Patient Preferred    [facility]   Selected Durable  Medical Equipment [address] [phone] -- --            Selected Continued Care - Episodes Includes selections from active Coordinated Care Management episodes    Rising Risk Care Management Episode start date: 7/1/2022   There are no active outsourced providers for this episode.                  Demographic Summary    No documentation.                Functional Status    No documentation.                Psychosocial    No documentation.                Abuse/Neglect    No documentation.                Legal    No documentation.                Substance Abuse    No documentation.                Patient Forms    No documentation.                   Kandace Mcdowell RN

## 2022-07-02 NOTE — CASE MANAGEMENT/SOCIAL WORK
Continued Stay Note  [facility]     Patient Name: Diallo Spence  MRN: [id number]  Today's Date: 7/2/2022    Admit Date: 6/30/2022     Discharge Plan     Row Name 07/02/22 1334       Plan    Plan Home    Patient/Family in Agreement with Plan yes    Plan Comments Received word from JUANCARLOS Davis, that per Dr Sneed, he would like for Ms Spence to go home with HHPT. Unable to arrange this today due to patient has commercial [place] and [place] It's very unlikely that insurance will approve and there will be difficulty in finding a HH agency that [place] in the [place] [facility] area. However, if she is DC over the holiday weekend or on July 4, then CM will follow-up and make referrals. We can also try for outpatient PT at [facility]. Went to visit with her at the bedside to relay this information, and she was off the unit for a MRI.    Final Discharge Disposition Code 01 - home or self-care               Discharge Codes    No documentation.                     Kandace Mcdowell RN

## 2022-07-02 NOTE — DISCHARGE SUMMARY
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 female with past medical history of migraine headache, GERD, hiatal hernia, anxiety and depression, ADHD who presented to the hospital with acute stroke  like symptoms (expressivea phasia and right hemiplegia with right facial droop) and was admitted to the intensive care unit where she received thrombolytic therapy.  All imaging studies including CTA head and neck and MRI brain were unremarkable for any acute stroke.  And underwent pan spine MRI with and without contrast that did not show any acute spinal abnormalities to explain her weakness and significant hyperreflexia of both lower extremities noted on exam (no evidence of demyelinating disease or transverse myelitis).  Seen by Dr. Rondon/neurology who is impressed about functional neurologic disorder recommended to discontinue aspirin and Lipitor.  For her migraine headache, he recommended to start the patient on magnesium  500 mg daily, co-Q10 150 mg daily and riboflavin 400 mg daily and to follow-up with his clinic in 4 weeks. upon discharge, the patient continued to have right lower extremity weakness with right foot drop and PT recommended inpatient rehab but the patient declined.  She is interested in outpatient rehab and  arrange for it to be given at [facility] where she lives.  She remained stable throughout this hospitalization and was discharged home with her parents who will be caring for her.  She recovers.    Discharge Follow Up Recommendations for outpatient labs/diagnostics:  PCP in 1 week  Neurology/ Dr diaz's in 4 weeks    Day of Discharge     HPI:   I have seen and evaluated the patient this morning.  Comfortable in bed.   still complaining of headache that she rates as 4/10 in intensity.  Still weak and her right lower extremity but weakness of right upper extremity much improved and back to normal.  Still complaining of stuttering speech.  Parents at bedside and updated.  She was offered to stay in the hospital and arrange for inpatient rehab but she declined.  Also interested in outpatient rehab rather than home PT.    Review of Systems  General : no fevers, chills  CVS: No chest pain, palpitations  Respiratory: No cough, dyspnea  GI: No N/V/D, abd pain  10 point review of systems is negative except for what is mentioned in HPI    Vital Signs:   Temp:  [97.8 °F (36.6 °C)-98.9 °F (37.2 °C)] 97.8 °F (36.6 °C)  Heart Rate:  [61-89] 74  Resp:  [18] 18  BP: (111-125)/(72-87) 124/82      Physical Exam:  General: Comfortable, not in any acute distress, appears stated age, conversant and cooperative  Head: Atraumatic and normocephalic, without obvious abnormality  Eyes:   Conjunctivae and sclerae normal, no Icterus. No pallor  Ears:  Ears appear intact with no abnormalities noted  Throat: No oral lesions, no thrush, oral mucosa moist  Neck: Supple, trachea midline, no thyromegaly  Back:   No  kyphoscoliosis present. No tenderness to palpation,   no sacral edema  Lungs: Clear to auscultation bilaterally, equal air entry, no wheezing or crackles  Heart:  Normal S1 and S2, no murmur, no gallop, No JVD, no lower extremity swelling  Abdomen:  Soft, no tenderness, no organomegaly, normal bowel sounds  Normal bowel sounds, no masses, no organomegaly. Soft, nontender, nondistended, no guarding, no rebound tenderness.  Extremities: No gross abnormalities, no clubbing, pulses palpable and equal bilaterally  Skin: No bleeding, bruising or rash, normal skin turgor and elasticity  Neurologic: Stuttering speech noted.  Cranial nerves intact.  Motor and sensory function and reflexes are intact of both upper extremities.  Right lower extremity weakness 1/5 with hypotonia.  Mild right lower extremity hypoesthesia.  Significant bilateral lower extremity hyperreflexia for both knee and ankle reflexes, no clonus  Psych: Alert and oriented x 3, normal mood    Pertinent  and/or Most Recent Results     LAB RESULTS:      Lab 07/01/22  0826 06/30/22  1300 06/30/22  1255   WBC 8.74 10.54  --    HEMOGLOBIN 13.1 12.7  --    HEMOGLOBIN, POC  --   --  13.6   HEMATOCRIT 39.4 37.0  --    HEMATOCRIT POC  --   --  40   PLATELETS 311 329  --    NEUTROS ABS  --  7.97*  --    IMMATURE GRANS (ABS)  --  0.03  --    LYMPHS ABS  --  1.92  --    MONOS ABS  --  0.52  --    EOS ABS  --  0.04  --    MCV 91.4 88.5  --    APTT  --  31.9  --          Lab 07/01/22  0631 06/30/22  1300 06/30/22  1255   SODIUM 134*  --   --    POTASSIUM 4.8  --   --    CHLORIDE 103  --   --    CO2 20.0*  --   --    ANION GAP 11.0  --   --    BUN 10  --   --    CREATININE 0.94  --  0.90   EGFR 81.3  --  85.7   GLUCOSE 90  --   --    CALCIUM 9.7  --   --    MAGNESIUM 2.4  --   --    PHOSPHORUS 3.2  --   --    HEMOGLOBIN A1C  --  4.80  --    TSH  --  3.580  --          Lab 06/30/22  1300   ALT (SGPT) 11   AST (SGOT) 17         Lab 06/30/22  1300   TROPONIN T <0.010          Lab 07/01/22  0631   CHOLESTEROL 181   LDL CHOL 115*   HDL CHOL 53   TRIGLYCERIDES 72         Brief Urine Lab Results  (Last result in the past 365 days)      Color   Clarity   Blood   Leuk Est   Nitrite   Protein   CREAT   Urine HCG        06/30/22 1753               Negative           Microbiology Results (last 10 days)     Procedure Component Value - Date/Time    COVID PRE-OP / PRE-PROCEDURE SCREENING ORDER (NO ISOLATION) - Swab, Nasopharynx [[id number]]  (Normal) Collected: 06/30/22 1348    Lab Status: Final result Specimen: Swab from Nasopharynx Updated: 06/30/22 1424    Narrative:      The following orders were created for panel order COVID PRE-OP / PRE-PROCEDURE SCREENING ORDER (NO ISOLATION) - Swab, Nasopharynx.  Procedure                               Abnormality         Status                     ---------                               -----------         ------                     COVID-19 and FLU A/B PCR...[[id number]]  Normal              Final result                 Please view results for these tests on the individual orders.    COVID-19 and FLU A/B PCR - Swab, Nasopharynx [[id number]]  (Normal) Collected: 06/30/22 1348    Lab Status: Final result Specimen: Swab from Nasopharynx Updated: 06/30/22 1424     COVID19 Not Detected     Influenza A PCR Not Detected     Influenza B PCR Not Detected    Narrative:      Fact sheet for providers: https://www.fda.gov/media/920173/download    Fact sheet for patients: https://www.fda.gov/media/679258/download    Test performed by PCR.        Adult Transthoracic Echo Complete W/ Cont if Necessary Per Protocol (With Agitated Saline)    Result Date: 7/1/2022  · Left ventricular ejection fraction appears to be 61 - 65%. Left ventricular systolic function is normal. · Left ventricular diastolic function was normal. · Saline test results are negative for right to left atrial level shunt. · Normal cardiac valves.      CT Head Without Contrast    Result Date: 7/1/2022  CT  HEAD WO CONTRAST-  Date of Exam: 7/1/2022 1:50 PM  Indication: Stroke, follow up; R53.1-Weakness; R47.01-Aphasia; R13.11-Dysphagia, oral phase.  Comparison: CT scan of the head from 06/30/2022; CT perfusion scan 06/30/2022; MRI brain 07/01/2022  Technique:  Without contrast, contiguous axial CT images of the head were obtained from skull base to vertex.  Coronal and sagittal reconstructions were performed.  Automated exposure control and iterative reconstruction methods were used.  FINDINGS No evidence of intracranial hemorrhage, mass, or midline shift. The ventricles appear normal in size for the patient's age. No extra-axial collections identified. The gray white matter differentiation is intact. No air-fluid levels identified within the paranasal sinuses. The extra-axial structures demonstrate no acute process.      No evidence of hemorrhage, mass effect or midline shift. No acute process identified.  This report was finalized on 7/1/2022 2:02 PM by Julius Echavarria MD.      CT Angiogram Neck    Result Date: 6/30/2022  DATE OF EXAM: 6/30/2022 12:59 PM  PROCEDURE: CT CEREBRAL PERFUSION W WO CONTRAST-, CT ANGIOGRAM NECK-, CT ANGIOGRAM HEAD W AI ANALYSIS OF LVO-  INDICATIONS: Neuro deficit, acute, stroke suspected  COMPARISON: No comparisons available.  TECHNIQUE: Routine transaxial cuts were obtained through the head without administration of contrast. Routine transaxial cuts were then obtained through the head following the intravenous administration of 115 mL of Isovue 300. Core blood volume, core blood flow, mean transit time, and Tmax images were obtained utilizing the Rapid software protocol. A limited CT angiogram of the head was also performed to measure the blood vessel density.  Contrast-enhanced volumetric CT angiographic imaging of the head and neck was performed using the IV contrast dose referenced above. 3-D and color rendered reconstructions were created for interpretation.  The radiation dose reduction  device was turned on for each scan per the ALARA (As Low as Reasonably Achievable) protocol.  FINDINGS: CT perfusion: There is no evidence of a perfusion abnormality in the brain. No core infarct or brain at risk.  CTA head and neck: CT angiography: The aortic arch is normal. There is conventional 3 vessel arch anatomy. The right brachiocephalic and bilateral subclavian arteries appear widely patent. Bilateral common carotid arteries are normal. The carotid bifurcations, ECA and distal branches and cervical internal carotid arteries appear normal. The intracranial ICA segments appear normal. There is a patent posterior communicating artery on both sides. There is a patent anterior communicating artery. The A1 and M1 segments and distal LILLIAM and MCA branches appear patent.  There are codominant vertebral arteries. The vertebral arteries follow a normal course and appear normal caliber throughout the neck and into the head. The V4 segments appear normal. The basilar artery, superior cerebellar arteries, P1 segments and distal PCA branches appear widely patent. Dural venous sinuses appear normal.  Nonvascular findings: Lung apices are clear. Superior mediastinal structures appear normal. Thyroid and salivary glands appear normal. There are no acute intracranial findings. No abnormal extra-axial collections. Orbits appear within normal limits. The paranasal sinuses and the mastoid air cells appear well aerated. Dentition is unremarkable. There is no evidence of a neck mass or adenopathy.      CT perfusion: No perfusion abnormalities in the brain.  CTA head and neck. No significant vascular abnormalities in the head or the neck. No high-grade stenosis, vessel occlusion, aneurysm or dissection.  This report was finalized on 6/30/2022 1:34 PM by Glenn Jamison MD.      MRI Brain Without Contrast    Result Date: 7/1/2022  MRI BRAIN WO CONTRAST-  Date of Exam: 7/1/2022 2:03 AM  Indication: Stroke, follow up; R53.1-Weakness;  R47.01-Aphasia; R13.11-Dysphagia, oral phase.  Technique: Routine multiplanar multisequence MR imaging of the brain was performed without the administration of   gadolinium contrast.  Comparison Exams: CT 06/30/2022  FINDINGS: There are no areas of restricted diffusion.   There is no evidence of intracranial hemorrhage. There is no mass, mass effect, or hydrocephalus. No abnormal extra-axial fluid collections are seen.  Major intracranial vascular flow voids are preserved. Sella and suprasellar cistern are within normal limits.   Cranio-vertebral junction is normal.   Globes and orbits are within normal limits.  Visualized paranasal sinuses and mastoid air cells are clear.       1. No acute intracranial abnormality.  This report was finalized on 7/1/2022 8:14 AM by Miky Johnson.      MRI Cervical Spine With & Without Contrast    Result Date: 7/2/2022  DATE OF EXAM: 7/2/2022 1:03 PM  PROCEDURE: MRI CERVICAL SPINE W WO CONTRAST-  INDICATIONS: Rt side weakness; R53.1-Weakness; R47.01-Aphasia; R47.89-Other speech disturbances  COMPARISON: No comparisons available.  TECHNIQUE: Standard MR pulse sequences of the cervical spine were obtained before and after the intravenous administration of 13 ml of MultiHance contrast.  FINDINGS: The craniovertebral junction does not appear unusual. There is no intrinsic cord abnormality. The signal within the marrow visualized cervical vertebra does not appear unusual.  C2-3: There is no definite disc herniation or intervertebral foraminal stenosis.  C3-4: Some bulging of the annulus probably superimposed on some osseous degenerative change. The intervertebral foramina appear patent.  C4-5: No definite disc herniation or intervertebral foraminal stenosis.  C5-6: No definite disc herniation or intervertebral foraminal stenosis.  C6-7: No definite disc herniation or intervertebral foraminal stenosis.  C7-T1: No definite disc herniation or intervertebral foraminal stenosis.   Postcontrast images reveal no unusual enhancement.      1.  There is some bulging of the annulus at the C3-4 level probably superimposed on some osseous degenerative change.  This report was finalized on 7/2/2022 3:19 PM by Juan Ernandez MD.      MRI Thoracic Spine With & Without Contrast    Result Date: 7/2/2022  DATE OF EXAM: 7/2/2022 1:42 PM  PROCEDURE: MRI THORACIC SPINE W WO CONTRAST-  INDICATIONS: Rt side weakness; R53.1-Weakness; R47.01-Aphasia; R47.89-Other speech disturbances  COMPARISON: No comparisons available.  TECHNIQUE: Standard MR pulse sequences of the thoracic spine were obtained before and after the intravenous administration of 13 mL MultiHance contrast.  FINDINGS: The signal within the marrow of the visualized thoracic vertebra does not appear unusual. The vertebral body heights are well-maintained. There is no intrinsic cord abnormality. There is no definite disc herniation. On the postcontrast images there is no abnormal enhancement.      1.  No significant underlying abnormality.  This report was finalized on 7/2/2022 3:21 PM by Juan Ernandez MD.      XR Chest 1 View    Result Date: 6/30/2022  DATE OF EXAM: 6/30/2022 1:22 PM  PROCEDURE: XR CHEST 1 VW-  INDICATIONS: Acute Stroke Protocol (onset < 12 hrs).  COMPARISON: None available.  TECHNIQUE: Single frontal view of the chest.  FINDINGS: Normal cardiomediastinal silhouette. There is diffuse interstitial prominence with prominent interstitial markings in the bilateral lower lobes. No pleural effusion or pneumothorax. No acute osseous findings.      Interstitial prominence, a nonspecific finding which may reflect chronic interstitial changes although atypical/viral infection or reactive airways disease could appear similarly. No focal consolidation.  This report was finalized on 6/30/2022 1:34 PM by Gucci Wells MD.      Duplex Carotid Ultrasound CAR    Result Date: 7/1/2022  · Proximal right internal carotid artery is normal. · Proximal left  internal carotid artery is normal.      CT Head Without Contrast Stroke Protocol    Result Date: 6/30/2022  DATE OF EXAM: 6/30/2022 12:45 PM  PROCEDURE: CT HEAD WO CONTRAST STROKE PROTOCOL-  INDICATIONS: Neuro deficit, acute, stroke suspected  COMPARISON: No comparisons available.  TECHNIQUE: Routine transaxial and coronal reconstruction images were obtained through the head without the administration of contrast. Automated exposure control and iterative reconstruction methods were used.  The radiation dose reduction device was turned on for each scan per the ALARA (As Low as Reasonably Achievable) protocol.  FINDINGS: The calvarium appears intact. Included paranasal sinuses and mastoids appear clear. Soft tissue window images show no evidence of hemorrhage, contusion or edema, no evidence of mass or mass effect, acute or old infarct, hydrocephalus, or abnormal extra-axial collection. No unusual focal or generalized brain atrophy is seen.      No evidence of acute intracranial disease.      Exam time shown is 12:45 PM. Study was reviewed on the CT scan monitor and discussed with Dr. Boone at 12:50 PM..  This report was finalized on 6/30/2022 1:11 PM by Dr. Reji Dunbar MD.      CT Angiogram Head w AI Analysis of LVO    Result Date: 6/30/2022  DATE OF EXAM: 6/30/2022 12:59 PM  PROCEDURE: CT CEREBRAL PERFUSION W WO CONTRAST-, CT ANGIOGRAM NECK-, CT ANGIOGRAM HEAD W AI ANALYSIS OF LVO-  INDICATIONS: Neuro deficit, acute, stroke suspected  COMPARISON: No comparisons available.  TECHNIQUE: Routine transaxial cuts were obtained through the head without administration of contrast. Routine transaxial cuts were then obtained through the head following the intravenous administration of 115 mL of Isovue 300. Core blood volume, core blood flow, mean transit time, and Tmax images were obtained utilizing the Rapid software protocol. A limited CT angiogram of the head was also performed to measure the blood vessel density.   Contrast-enhanced volumetric CT angiographic imaging of the head and neck was performed using the IV contrast dose referenced above. 3-D and color rendered reconstructions were created for interpretation.  The radiation dose reduction device was turned on for each scan per the ALARA (As Low as Reasonably Achievable) protocol.  FINDINGS: CT perfusion: There is no evidence of a perfusion abnormality in the brain. No core infarct or brain at risk.  CTA head and neck: CT angiography: The aortic arch is normal. There is conventional 3 vessel arch anatomy. The right brachiocephalic and bilateral subclavian arteries appear widely patent. Bilateral common carotid arteries are normal. The carotid bifurcations, ECA and distal branches and cervical internal carotid arteries appear normal. The intracranial ICA segments appear normal. There is a patent posterior communicating artery on both sides. There is a patent anterior communicating artery. The A1 and M1 segments and distal LILLIAM and MCA branches appear patent.  There are codominant vertebral arteries. The vertebral arteries follow a normal course and appear normal caliber throughout the neck and into the head. The V4 segments appear normal. The basilar artery, superior cerebellar arteries, P1 segments and distal PCA branches appear widely patent. Dural venous sinuses appear normal.  Nonvascular findings: Lung apices are clear. Superior mediastinal structures appear normal. Thyroid and salivary glands appear normal. There are no acute intracranial findings. No abnormal extra-axial collections. Orbits appear within normal limits. The paranasal sinuses and the mastoid air cells appear well aerated. Dentition is unremarkable. There is no evidence of a neck mass or adenopathy.      CT perfusion: No perfusion abnormalities in the brain.  CTA head and neck. No significant vascular abnormalities in the head or the neck. No high-grade stenosis, vessel occlusion, aneurysm or  dissection.  This report was finalized on 6/30/2022 1:34 PM by Glenn Jamison MD.      CT CEREBRAL PERFUSION WITH & WITHOUT CONTRAST    Result Date: 6/30/2022  DATE OF EXAM: 6/30/2022 12:59 PM  PROCEDURE: CT CEREBRAL PERFUSION W WO CONTRAST-, CT ANGIOGRAM NECK-, CT ANGIOGRAM HEAD W AI ANALYSIS OF LVO-  INDICATIONS: Neuro deficit, acute, stroke suspected  COMPARISON: No comparisons available.  TECHNIQUE: Routine transaxial cuts were obtained through the head without administration of contrast. Routine transaxial cuts were then obtained through the head following the intravenous administration of 115 mL of Isovue 300. Core blood volume, core blood flow, mean transit time, and Tmax images were obtained utilizing the Rapid software protocol. A limited CT angiogram of the head was also performed to measure the blood vessel density.  Contrast-enhanced volumetric CT angiographic imaging of the head and neck was performed using the IV contrast dose referenced above. 3-D and color rendered reconstructions were created for interpretation.  The radiation dose reduction device was turned on for each scan per the ALARA (As Low as Reasonably Achievable) protocol.  FINDINGS: CT perfusion: There is no evidence of a perfusion abnormality in the brain. No core infarct or brain at risk.  CTA head and neck: CT angiography: The aortic arch is normal. There is conventional 3 vessel arch anatomy. The right brachiocephalic and bilateral subclavian arteries appear widely patent. Bilateral common carotid arteries are normal. The carotid bifurcations, ECA and distal branches and cervical internal carotid arteries appear normal. The intracranial ICA segments appear normal. There is a patent posterior communicating artery on both sides. There is a patent anterior communicating artery. The A1 and M1 segments and distal LILLIAM and MCA branches appear patent.  There are codominant vertebral arteries. The vertebral arteries follow a normal course and  appear normal caliber throughout the neck and into the head. The V4 segments appear normal. The basilar artery, superior cerebellar arteries, P1 segments and distal PCA branches appear widely patent. Dural venous sinuses appear normal.  Nonvascular findings: Lung apices are clear. Superior mediastinal structures appear normal. Thyroid and salivary glands appear normal. There are no acute intracranial findings. No abnormal extra-axial collections. Orbits appear within normal limits. The paranasal sinuses and the mastoid air cells appear well aerated. Dentition is unremarkable. There is no evidence of a neck mass or adenopathy.      CT perfusion: No perfusion abnormalities in the brain.  CTA head and neck. No significant vascular abnormalities in the head or the neck. No high-grade stenosis, vessel occlusion, aneurysm or dissection.  This report was finalized on 6/30/2022 1:34 PM by Glenn Jamison MD.      MRI Lumbar Spine With & Without Contrast    Result Date: 7/2/2022  DATE OF EXAM: 7/2/2022 1:33 PM  PROCEDURE: MRI LUMBAR SPINE W WO CONTRAST-  INDICATIONS: Rt lower ext weakness; R53.1-Weakness; R47.01-Aphasia; R47.89-Other speech disturbances  COMPARISON: No comparisons available.  TECHNIQUE: Routine magnetic resonance imaging of the lumbar spine was performed before and after administration of 13 ml of MultiHance contrast.  FINDINGS: The signal within the marrow visualized lumbar vertebra does not appear unusual. The conus is around the level of T12.  T12-L1: No definite disc herniation or intervertebral foraminal stenosis.  L1-L2: No definite disc herniation or intervertebral foraminal stenosis.  L2-L3: No definite disc herniation or intervertebral foraminal stenosis.  L3-L4: No definite disc herniation or intervertebral foraminal stenosis.  L4-L5: No definite disc herniation or intervertebral foraminal stenosis.  L5-S1: No definite disc herniation or intervertebral foraminal stenosis.  Postcontrast images reveal  no abnormal enhancement.      1.  No significant underlying abnormality.  This report was finalized on 7/2/2022 3:24 PM by Juan Ernandez MD.      Results for orders placed during the hospital encounter of 06/30/22    Duplex Carotid Ultrasound CAR    Interpretation Summary  · Proximal right internal carotid artery is normal.  · Proximal left internal carotid artery is normal.    Results for orders placed during the hospital encounter of 06/30/22    Duplex Carotid Ultrasound CAR    Interpretation Summary  · Proximal right internal carotid artery is normal.  · Proximal left internal carotid artery is normal.    Results for orders placed during the hospital encounter of 06/30/22    Adult Transthoracic Echo Complete W/ Cont if Necessary Per Protocol (With Agitated Saline)    Interpretation Summary  · Left ventricular ejection fraction appears to be 61 - 65%. Left ventricular systolic function is normal.  · Left ventricular diastolic function was normal.  · Saline test results are negative for right to left atrial level shunt.  · Normal cardiac valves.    Plan for Follow-up of Pending Labs/Results:    Discharge Details        Discharge Medications      New Medications      Instructions Start Date   co-enzyme Q-10 30 MG capsule   150 mg, Oral, Daily      magnesium oxide 400 MG tablet  Commonly known as: MAG-OX   400 mg, Oral, 2 Times Daily      oxyCODONE-acetaminophen 5-325 MG per tablet  Commonly known as: Percocet   1 tablet, Oral, Every 6 Hours PRN      Riboflavin 400 MG tablet   400 mg, Oral, Daily With Breakfast         Changes to Medications      Instructions Start Date   sulfamethoxazole-trimethoprim 800-160 MG per tablet  Commonly known as: BACTRIM DS,SEPTRA DS  What changed: additional instructions   1 tablet, Oral, 2 Times Daily         Continue These Medications      Instructions Start Date   FLUoxetine 20 MG capsule  Commonly known as: PROzac   60 mg, Oral, Daily      metoprolol succinate XL 25 MG 24 hr  tablet  Commonly known as: TOPROL-XL   Take 1/2 tablet by mouth Daily.      pantoprazole 40 MG EC tablet  Commonly known as: Protonix   40 mg, Oral, Daily      SUMAtriptan 25 MG tablet  Commonly known as: Imitrex   Take 1 tablet by mouth 1 Time As Needed for Migraine. Take 1 tablet at onset of headache. May repeat dose 1 time in 2 hours if headache not relieved.      Vitamin D3 25 MCG (1000 UT) capsule   1,000 Units, Oral, Daily         Stop These Medications    fluconazole 150 MG tablet  Commonly known as: Diflucan          Allergies   Allergen Reactions   • Latex      Discharge Disposition:  Home or Self Care    Diet:  Hospital:  Diet Order   Procedures   • Diet Regular; Thin     Activity:  Activity Instructions     Activity as Tolerated          Restrictions or Other Recommendations:  None        CODE STATUS:    Code Status and Medical Interventions:   Ordered at: 06/30/22 1658     Level Of Support Discussed With:    Patient     Code Status (Patient has no pulse and is not breathing):    CPR (Attempt to Resuscitate)     Medical Interventions (Patient has pulse or is breathing):    Full Support     Future Appointments   Date Time Provider Department Center   7/8/2022  4:15 PM Kelly Nix APRN [facility] None   7/21/2022 11:45 AM Cinda Alvarado APRN [facility]     Additional Instructions for the Follow-ups that You Need to Schedule     Ambulatory Referral to Physical Therapy Evaluate and treat; (as tolerated)   As directed      Specialty needed: Evaluate and treat    Weight Bearing Status:  (as tolerated)             Luis Miguel Sneed MD  07/02/22    Time Spent on Discharge:  I spent  60 minutes on this discharge activity which included: face-to-face encounter with the patient, reviewing the data in the system, coordination of the care with the nursing staff as well as consultants, documentation, and entering orders.

## 2022-07-02 NOTE — PROGRESS NOTES
Stroke Progress Note       Chief Complaint:   Right leg weakness and speech difficulty.     Subjective    Subjective     Subjective:  No acute events overnight    Continues to have right leg paresthesias right leg weakness and stammering.    Review of Systems   Constitutional: No fatigue  HENT: Negative for nosebleeds and rhinorrhea.    Eyes: Negative for redness.   Respiratory: Negative for cough.    Gastrointestinal: Negative for anal bleeding.   Endocrine: Negative for polydipsia.   Genitourinary: Negative for enuresis and urgency.   Musculoskeletal: Negative for joint swelling.   Neurological: Negative for tremors.   Psychiatric/Behavioral: Negative for hallucinations.   Objective      Temp:  [97.8 °F (36.6 °C)-99.1 °F (37.3 °C)] 98.9 °F (37.2 °C)  Heart Rate:  [61-89] 80  Resp:  [18] 18  BP: (111-125)/(72-87) 125/87    NEURO    MENTAL STATUS: AAOx3, memory intact, fund of knowledge appropriate    LANG/SPEECH:stammerring and hesitant speech     CRANIAL NERVES:    Pupils equal and reactive, EOMI intact, no gaze deviation, no nystagmus  No facial droop, cough and gag intact, shoulder shrug intact, tongue midline     MOTOR:  Moves all extremities equally- except right leg and arm- effort related weakness.   Arnold positive.    SENSORY: Normal to light touch all throughout     COORDINATION: Normal finger to nose and heel to shin, no tremor, no dysmetria    STATION: Not assessed due to patient condition    GAIT: Not assessed due to patient condition  Results Review:    I reviewed the patient's new clinical results.    Lab Results (last 24 hours)     Procedure Component Value Units Date/Time    POC Glucose Once [[id number]]  (Normal) Collected: 07/02/22 1134    Specimen: Blood Updated: 07/02/22 1141     Glucose 73 mg/dL      Comment: Meter: [id number] : [id number] Jason Alfaro       POC Glucose Once [[id number]]  (Normal) Collected: 07/02/22 0744    Specimen: Blood Updated: 07/02/22 0746     Glucose 95 mg/dL       Comment: Meter: [id number] : [id number] Jason Alfaro       POC Glucose Once [[id number]]  (Normal) Collected: 07/01/22 2003    Specimen: Blood Updated: 07/01/22 2004     Glucose 121 mg/dL      Comment: Meter: [id number] : [id number] Alfredo Jimenes           CT Head Without Contrast    Result Date: 7/1/2022  No evidence of hemorrhage, mass effect or midline shift. No acute process identified.  This report was finalized on 7/1/2022 2:02 PM by Julius Echavarria MD.      MRI Brain Without Contrast    Result Date: 7/1/2022   1. No acute intracranial abnormality.  This report was finalized on 7/1/2022 8:14 AM by Miky Johnson.      MRI Cervical Spine With & Without Contrast    Result Date: 7/2/2022  1.  There is some bulging of the annulus at the C3-4 level probably superimposed on some osseous degenerative change.  This report was finalized on 7/2/2022 3:19 PM by Juan Ernandez MD.      MRI Thoracic Spine With & Without Contrast    Result Date: 7/2/2022  1.  No significant underlying abnormality.  This report was finalized on 7/2/2022 3:21 PM by Juan Ernandez MD.      MRI Lumbar Spine With & Without Contrast    Result Date: 7/2/2022  1.  No significant underlying abnormality.  This report was finalized on 7/2/2022 3:24 PM by Juan Ernandez MD.      Results for orders placed during the hospital encounter of 06/30/22    Adult Transthoracic Echo Complete W/ Cont if Necessary Per Protocol (With Agitated Saline)    Interpretation Summary  · Left ventricular ejection fraction appears to be 61 - 65%. Left ventricular systolic function is normal.  · Left ventricular diastolic function was normal.  · Saline test results are negative for right to left atrial level shunt.  · Normal cardiac valves.            Assessment/Plan     Assessment/Plan:  This is 37-year-old right-handed white female with active medical problems of anxiety depression panic disorder, migraine presented with an acute onset of right-sided weakness.  Patient was  given IV TNKase with the presentation.       On my exam, patient is having poor effort in the right lower extremity, inconsistent exam of the right upper extremity.  Likely this is effort related.  Family and the patient states that patient suffers with migraine, but this is atypical.       Acute stroke imaging included CT head, CTA head and neck, CT perfusion, which did not reveal any acute abnormality. MRI brain did not evidence any acute infarct.      Based on history, exam, diagnostics less likely with ischemic vascular event of the brain. Her reflexes are 3+ symmetrical, no up-going toe, with no back pain, and bowel bladder issues. I do not think MRI L spine is indicated at this time, given her symptoms ( stammering, right arm and right leg weakness) which does not localize to lumbar spine.  However, MRI whole spine was ordered by the hospitalist,  which did not reveal any acute abnormality. 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     Plan  -discontinue aspirin and Lipitor  -Pt/OT/SPeech can continue to work with the patient  -Restart magnesium 500, riboflavin 400, coq.10 150 as migraine prophylaxis  -Follow-up with Dr. Shaikh or follow-up with me in the stroke clinic in 1 month      Christian Rondon MD  07/02/22  16:01 EDT

## 2022-07-02 NOTE — PLAN OF CARE
Goal Outcome Evaluation:  Plan of Care Reviewed With: patient  Pt. refuse schedule medication. No acute distress noted.IV 20g Left AC and IV 18g right AC , flush with NS, clean dry and intact dressing. Pt. tolerated it well. SCD and Purewick in place. Will continue to monitor patient throughout the rest of this shift.

## 2022-07-05 ENCOUNTER — OFFICE VISIT (OUTPATIENT)
Dept: CARDIOLOGY | Facility: CLINIC | Age: 37
End: 2022-07-05

## 2022-07-05 VITALS
TEMPERATURE: 98.4 F | HEART RATE: 75 BPM | HEIGHT: 66 IN | DIASTOLIC BLOOD PRESSURE: 86 MMHG | WEIGHT: 160 LBS | SYSTOLIC BLOOD PRESSURE: 114 MMHG | OXYGEN SATURATION: 100 % | BODY MASS INDEX: 25.71 KG/M2

## 2022-07-05 DIAGNOSIS — F32.A DEPRESSION, UNSPECIFIED DEPRESSION TYPE: ICD-10-CM

## 2022-07-05 DIAGNOSIS — R00.2 PALPITATIONS: ICD-10-CM

## 2022-07-05 DIAGNOSIS — R53.1 RIGHT SIDED WEAKNESS: ICD-10-CM

## 2022-07-05 DIAGNOSIS — F41.9 ANXIETY: ICD-10-CM

## 2022-07-05 DIAGNOSIS — G43.401 HEMIPLEGIC MIGRAINE WITH STATUS MIGRAINOSUS, NOT INTRACTABLE: Primary | ICD-10-CM

## 2022-07-05 DIAGNOSIS — F90.9 ATTENTION DEFICIT HYPERACTIVITY DISORDER (ADHD), UNSPECIFIED ADHD TYPE: ICD-10-CM

## 2022-07-05 PROCEDURE — 99214 OFFICE O/P EST MOD 30 MIN: CPT | Performed by: NURSE PRACTITIONER

## 2022-07-05 RX ORDER — TOPIRAMATE 25 MG/1
25 TABLET ORAL
Qty: 30 TABLET | Refills: 1 | Status: SHIPPED | OUTPATIENT
Start: 2022-07-05 | End: 2022-07-19 | Stop reason: SDUPTHER

## 2022-07-05 RX ORDER — VERAPAMIL HYDROCHLORIDE 40 MG/1
TABLET ORAL
Qty: 60 TABLET | Refills: 1 | Status: SHIPPED | OUTPATIENT
Start: 2022-07-05 | End: 2022-07-19 | Stop reason: SDUPTHER

## 2022-07-05 RX ORDER — SUMATRIPTAN 25 MG/1
25 TABLET, FILM COATED ORAL ONCE AS NEEDED
Qty: 9 TABLET | Refills: 1 | Status: SHIPPED | OUTPATIENT
Start: 2022-07-05 | End: 2022-07-19 | Stop reason: SDUPTHER

## 2022-07-05 NOTE — PROGRESS NOTES
Subjective   Diallo Spence is a 37 y.o. female.   Chief Complaint   Patient presents with   • Hospital Follow Up Visit     SYED Luu   here for neuro referral       History of Present Illness   Wayne Spence is a 37-year-old female who presents to clinic today for follow-up. She is accompanied by her mother.     She is here today for hospital follow-up.  She was hospitalized at [facility] recently diagnosed with hemiplegic migraine. She is needing a referral to a neurologists who specializes in headaches.  She continues to regain strength in her right lower extremity however continues to have some difficulties and outpatient physical therapy has been arranged.  She is using a rolling walker for assistance. Her function and strength in her right upper extremity are baseline.  She continues to have bouts of expressive aphasia which are gradually improving.  She underwent extensive imaging and work-up during her hospitalization to rule out CVA, demyelinating disease or transverse myelitis.  She was discharged home on co-Q10, magnesium, riboflavin and Percocet.  She has underlying migraine headaches and uses Imitrex as needed for migraine headaches.  She also continues to take a lot of Karina aspirin and NSAIDs for her headaches.  She has underlying anxiety/depression and ADHD and managed by psychiatry who has recently increased her fluoxetine to 60 mg daily and changed her Ritalin to Concerta.  During hospitalization, it was recommended that she stop Concerta which she has remained off of.  She has follow-up with psychiatry this week.  She had a bee sting recently and was placed on Bactrim for possible early cellulitis and steroid taper she discontinued those medications due to hospitalization and reports the area seems to be resolving.     The following portions of the patient's history were reviewed and updated as appropriate: allergies, current medications, past family history, past medical  history, past social history, past surgical history and problem list.    Current Outpatient Medications:   •  SUMAtriptan (Imitrex) 25 MG tablet, Take 1 tablet by mouth 1 Time As Needed for Migraine. Take 1 tablet at onset of headache. May repeat dose 1 time in 2 hours if headache not relieved., Disp: 9 tablet, Rfl: 1  •  Cholecalciferol (Vitamin D3) 25 MCG (1000 UT) capsule, Take 1 capsule by mouth Daily., Disp: 60 capsule, Rfl: 0  •  co-enzyme Q-10 30 MG capsule, Take 5 capsules by mouth Daily for 30 days., Disp: 150 capsule, Rfl: 2  •  FLUoxetine (PROzac) 20 MG capsule, Take 3 capsules by mouth Daily., Disp: 90 capsule, Rfl: 0  •  magnesium oxide (MAG-OX) 400 MG tablet, Take 1 tablet by mouth 2 (Two) Times a Day for 30 days., Disp: 60 tablet, Rfl: 2  •  oxyCODONE-acetaminophen (Percocet) 5-325 MG per tablet, Take 1 tablet by mouth Every 6 (Six) Hours As Needed for Severe Pain  or Moderate Pain ., Disp: 30 tablet, Rfl: 0  •  pantoprazole (Protonix) 40 MG EC tablet, Take 1 tablet by mouth Daily., Disp: 90 tablet, Rfl: 1  •  Riboflavin 400 MG tablet, Take 400 mg by mouth Daily With Breakfast., Disp: 30 tablet, Rfl: 3  •  topiramate (Topamax) 25 MG tablet, Take 1 tablet by mouth every night at bedtime., Disp: 30 tablet, Rfl: 1  •  verapamil (CALAN) 40 MG tablet, Take 1 pill by mouth daily and if BP tolerates then advance to BID, Disp: 60 tablet, Rfl: 1    Review of Systems   Constitutional: Negative for activity change, appetite change, chills, diaphoresis, fatigue and fever.   HENT: Negative for congestion, drooling, ear discharge, ear pain, mouth sores, nosebleeds, postnasal drip, rhinorrhea, sinus pressure, sneezing and sore throat.    Eyes: Negative for pain, discharge and visual disturbance.   Respiratory: Negative for cough, chest tightness, shortness of breath and wheezing.    Cardiovascular: Negative for chest pain, palpitations and leg swelling.   Gastrointestinal: Negative for abdominal pain, constipation,  diarrhea, nausea and vomiting.   Endocrine: Negative for cold intolerance, heat intolerance, polydipsia, polyphagia and polyuria.   Musculoskeletal: Negative for arthralgias, myalgias and neck pain.   Skin: Negative for rash and wound.   Neurological: Positive for weakness and headaches. Negative for syncope, speech difficulty and light-headedness.   Hematological: Negative for adenopathy. Does not bruise/bleed easily.   Psychiatric/Behavioral: Negative for confusion, dysphoric mood and sleep disturbance. The patient is not nervous/anxious.    All other systems reviewed and are negative.    Patient Active Problem List   Diagnosis   • Anxiety   • Depression   • Palpitations   • Gastroesophageal reflux disease without esophagitis   • Liver hemangioma   • Fatty liver   • Hiatal hernia   • Colon polyp   • ADHD   • Migraine headache   • Acute right-sided weakness   • 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     Past Medical History:   Diagnosis Date   • ADHD (attention deficit hyperactivity disorder)    • Anxiety    • Colitis 2020    11/3/2020 had colonoscopy and recommend repeat in 5 years   • Depression    • Fatty liver    • GERD (gastroesophageal reflux disease)    • Hiatal hernia    • Liver hemangioma 06/15/2022   • Liver hemangioma    • Liver problem    • Migraine headache    • Panic disorder    • Peripheral neuropathy    • Psychiatric illness    • Self-injurious behavior      Past Surgical History:   Procedure Laterality Date   •  SECTION     • CHOLECYSTECTOMY     • COLONOSCOPY      / BEBETO Reynolds- Normal    • COLONOSCOPY N/A 2016    Procedure: COLONOSCOPY CPT CODE: 72646;  Surgeon: Sherry Muse DO;  [facility];  Service:    • ENDOSCOPY N/A 2016    Procedure: ESOPHAGOGASTRODUODENOSCOPY WITH BIOPSY CPT CODE: 61445;  Surgeon: Sherry Muse DO;  [facility];  Service:    • GALLBLADDER SURGERY     • NERVE SURGERY      Right arm   "  • SHOULDER SURGERY     • TONSILLECTOMY     • UPPER GASTROINTESTINAL ENDOSCOPY  2010    Dr. Jeter/ Small hiatal hernia      /86 (BP Location: Left arm, Patient Position: Sitting, Cuff Size: Adult)   Pulse 75   Temp 98.4 °F (36.9 °C)   Ht 167.6 cm (66\")   Wt 72.6 kg (160 lb)   SpO2 100%   BMI 25.82 kg/m²     Objective   Allergies   Allergen Reactions   • Latex      Physical Exam  Vitals reviewed.   Constitutional:       Appearance: Normal appearance. She is well-developed.   HENT:      Head: Normocephalic.   Eyes:      Conjunctiva/sclera: Conjunctivae normal.   Neck:      Thyroid: No thyromegaly.      Vascular: No carotid bruit or JVD.   Cardiovascular:      Rate and Rhythm: Normal rate and regular rhythm.   Pulmonary:      Effort: Pulmonary effort is normal.      Breath sounds: Normal breath sounds.   Abdominal:      General: Bowel sounds are normal.      Palpations: Abdomen is soft. There is no hepatomegaly, splenomegaly or mass.      Tenderness: There is no abdominal tenderness.   Musculoskeletal:      Cervical back: Normal range of motion and neck supple.      Right lower leg: No edema.      Left lower leg: No edema.      Comments: Uses a rolling walker  Reflexes present and equal bilaterally in upper extremities.  Good strength in upper extremities as compared bilaterally  Reflexes present in bilateral lower extremities.  Strength diminished in lower extremity as compared bilaterally.   Skin:     General: Skin is warm and dry.      Comments: Resolving bruises on upper extremities noted from venipuncture during hospitalization   Neurological:      Mental Status: She is alert and oriented to person, place, and time.      Comments: Aphasia noted during exam today.  Appropriately dressed.   Psychiatric:         Attention and Perception: Attention normal.         Behavior: Behavior is cooperative.         Assessment & Plan   Diagnoses and all orders for this visit:    1. Hemiplegic migraine with status " migrainosus, not intractable (Primary)  -     Ambulatory Referral to Neurology  Start Topamax 25 mg nightly, start verapamil 40 mg daily while monitoring blood pressure. If blood pressure remains stable, she will increase to verapamil 40 mg twice daily.     2. Right sided weakness  Proceed with physical therapy     3. Anxiety  Continue with current regimen as prescribed by psychiatry    4. Depression, unspecified depression type    5. Attention deficit hyperactivity disorder (ADHD), unspecified ADHD type  She will remain off Concerta as recommended during hospital and further discuss treatment options with psychiatry.    6. Palpitations  Stop metoprolol as we are starting verapamil for migraine headaches.  Continue to monitor symptoms.  Recommend avoidance of caffeine and stimulants.    Other orders  -     topiramate (Topamax) 25 MG tablet; Take 1 tablet by mouth every night at bedtime.  Dispense: 30 tablet; Refill: 1  -     SUMAtriptan (Imitrex) 25 MG tablet; Take 1 tablet by mouth 1 Time As Needed for Migraine. Take 1 tablet at onset of headache. May repeat dose 1 time in 2 hours if headache not relieved.  Dispense: 9 tablet; Refill: 1  -     verapamil (CALAN) 40 MG tablet; Take 1 pill by mouth daily and if BP tolerates then advance to BID  Dispense: 60 tablet; Refill: 1      Follow-up as scheduled in 2 weeks, sooner if needed

## 2022-07-07 ENCOUNTER — TREATMENT (OUTPATIENT)
Dept: PHYSICAL THERAPY | Facility: CLINIC | Age: 37
End: 2022-07-07

## 2022-07-07 DIAGNOSIS — R53.1 ACUTE RIGHT-SIDED WEAKNESS: Primary | ICD-10-CM

## 2022-07-07 PROCEDURE — 97163 PT EVAL HIGH COMPLEX 45 MIN: CPT | Performed by: PHYSICAL THERAPIST

## 2022-07-07 PROCEDURE — 97116 GAIT TRAINING THERAPY: CPT | Performed by: PHYSICAL THERAPIST

## 2022-07-07 NOTE — PROGRESS NOTES
"    Physical Therapy Initial Evaluation and Plan of Care    Patient: Diallo Spence   : 1985  Diagnosis/ICD-10 Code:  Acute right-sided weakness [R53.1]  Referring practitioner: Luis Miguel Sneed MD  Date of Initial Visit: 2022  Today's Date: 2022  Patient seen for 1 session         Visit Diagnoses:    ICD-10-CM ICD-9-CM   1. Acute right-sided weakness  R53.1 728.87         Objective Measures: Tinetti: 10/28     TU seconds with rollator      Subjective Evaluation    History of Present Illness  Date of onset: 2022  Mechanism of injury: Patient arrives to therapy with her mother, who helps provide medical history.  On 2022, patient had an episode where she experienced a facial droop, right sided weakness, and difficulty with speech.  She notes that an ambulance was called and she was air lifted to [facility].  Patient notes that at [facility], tests were performed and were negative for a CVA.  Patient was eventually diagnosed with a hemiplegic migraine that caused right sided weakness.  Patient was discharged from the hospital on 2022.  Patient reports that she has regained movement and strength in her right UE, but does continue to have weakness and \"stiffness\" in the right LE.  Patient has been experiencing drop foot since the hemiplegic migraine.  Patient is having difficulty with balance and gait; she is currently ambulating with a rollator, but previously ambulated with no AD.  Patient reports that she is independent with bed mobility, but does have some difficulty.      Patient Occupation: MA (currently off work) Pain  Current pain ratin  Location: R) LE    Patient Goals  Patient goals for therapy: improved balance, increased motion, increased strength, independence with ADLs/IADLs and return to work             Objective          Active Range of Motion     Additional Active Range of Motion Details  R) DF: lacking 10 degrees from neutral    Strength/Myotome Testing "     Left Hip   Planes of Motion   Flexion: 4    Right Hip   Planes of Motion   Flexion: 2+    Left Knee   Flexion: 4+  Extension: 4+    Right Knee   Flexion: 2+  Extension: 2+    Left Ankle/Foot   Dorsiflexion: 4+  Plantar flexion: 4+    Right Ankle/Foot   Dorsiflexion: 2+  Plantar flexion: 2+  Inversion: 3    Ambulation     Observational Gait   Decreased right stance time.     Additional Observational Gait Details  Drop foot noted on the right, decreased right hip flexion.    Functional Assessment     Comments  Sit<>stand: CGA/supervision  Supine<>sit: min assist-CGA          Assessment & Plan     Assessment  Impairments: abnormal gait, abnormal or restricted ROM, activity intolerance, impaired balance, impaired physical strength, lacks appropriate home exercise program, pain with function, safety issue and weight-bearing intolerance  Functional Limitations: walking, pulling, pushing, uncomfortable because of pain, moving in bed, sitting, standing, stooping and unable to perform repetitive tasks  Assessment details: Patient is a 37 year old female who comes to physical therapy for right sided weakness. The patient presents with decreased right ankle DF ROM, decreased right LE strength, impaired balance, impaired gait, difficulty with transfers, and decreased endurance.  Patient is at increased fall risk, based on a score of 10/28 on the Tinetti.  Decreased gait speed, drop foot, decreased hip flexion, and decreased weightbearing is observed during ambulation.  Patient will benefit from skilled PT, so that patient can achieve maximum level of function.     Prognosis: good    Goals  Plan Goals: Short Term Goals: 4 weeks  1. Pt will perform sit<>stand transfers with minimal use of upper extremities to allow for improved independence.  2. Patient will be able to perform all bed mobility independently for improved independence at home.  3. Pt will perform TUG in less than 45 seconds for improved community  involvement.  4. Pt will score at least 15/28 on Tinetti for improved balance.    Long Term Goals: 12 weeks  1. Pt will be able to perform bilateral SLS for 5 seconds for improved balance.  2. Pt will perform TUG in less than 30 seconds for improved community involvement.  3. Pt will score at least 20/28 on Tinetti for improved balance.  4. Patient will ambulate for 200' with least restrictive assistive device and CGA with improved weight shifting to allow for improved community involvement.      Plan  Therapy options: will be seen for skilled therapy services  Planned modality interventions: cryotherapy, electrical stimulation/Russian stimulation, TENS and thermotherapy (hydrocollator packs)  Planned therapy interventions: manual therapy, ADL retraining, balance/weight-bearing training, neuromuscular re-education, body mechanics training, postural training, fine motor coordination training, soft tissue mobilization, flexibility, spinal/joint mobilization, functional ROM exercises, strengthening, gait training, stretching, home exercise program, therapeutic activities, IADL retraining, transfer training and joint mobilization  Frequency: 3x week  Duration in weeks: 12  Treatment plan discussed with: patient and family  Plan details: High Evaluation   82499  Re-evaluation   11026    Therapeutic exercise  82023  Therapeutic activity    25567  Neuromuscular re-education   55784  Manual therapy   94620  Gait training  29737    Attended e-stim  70303  Unattended e-stim (Private)  68588  Moist heat/cryotherapy 69973             History # of Personal Factors and/or Comorbidities: HIGH  Examination of Body System(s): # of elements: HIGH (4+)  Clinical Presentation: STABLE   Clinical Decision Making: HIGH      Timed:         Manual Therapy:         mins  87671;     Therapeutic Exercise:         mins  62937;     Neuromuscular Leyla:        mins  41226;    Therapeutic Activity:          mins  70789;     Gait Trainin      mins  36789;     Ultrasound:          mins  70099;    Ionto                                   mins   35726  Self Care                            mins   24347  Canalith Repos         mins 79749      Un-Timed:  Electrical Stimulation:         mins  54039 ( );  Dry Needling          mins self-pay  Traction          mins 22871  Low Eval          Mins  82785  Mod Eval          Mins  73955  High Eval                       37     Mins  99935        Timed Treatment:   11   mins   Total Treatment:     48   mins          PT: Sultana Martinez PT     License Number: [id number]  Electronically signed by Sultana Martinez PT, 07/07/22, 10:11 AM EDT    Certification Period: 7/7/2022 thru 10/4/2022  I certify that the therapy services are furnished while this patient is under my care.  The services outlined above are required by this patient, and will be reviewed every 90 days.         Physician Signature:__________________________________________________    PHYSICIAN: Luis Miguel Sneed MD  NPI: [id number]                                      DATE:      Please sign and return via fax to [email or web address] . Thank you, [facility].

## 2022-07-11 ENCOUNTER — TELEMEDICINE (OUTPATIENT)
Dept: NEUROLOGY | Facility: CLINIC | Age: 37
End: 2022-07-11

## 2022-07-11 DIAGNOSIS — G43.909 MIGRAINE WITHOUT STATUS MIGRAINOSUS, NOT INTRACTABLE, UNSPECIFIED MIGRAINE TYPE: Primary | ICD-10-CM

## 2022-07-11 PROCEDURE — 99215 OFFICE O/P EST HI 40 MIN: CPT | Performed by: STUDENT IN AN ORGANIZED HEALTH CARE EDUCATION/TRAINING PROGRAM

## 2022-07-11 NOTE — PROGRESS NOTES
Stroke Outpatient Tele Consult Note    Patient Name: Diallo Spence   MRN: [id number]  Age: 37 y.o.  Sex: female  : 1985    Primary Care Physician: Cinda Alvarado APRN  Referring Physician:  No ref. provider found    You have chosen to receive care through a telephone visit. Do you consent to use a telephone visit for your medical care today? Yes    Chief Complaint/Reason for Consultation: migraine and right side weakness    HPI:   This is 35-year-old female with active medical problems-headache was seen via telemedicine for follow-up visit after recent hospitalization for possible hemiplegic migraine.    Patient is recovering well, occasionally expresses that he has a trouble with speech.  She is coping well and has her mom help her with household chores.  Able to do bath clean herself.  She claims to regain her strength on her right side.  Today on my exam, patient was able to speak clearly without any hesitation.    The primary care has started her on Topamax and give a prescription of Imitrex.        Past Medical History:   Diagnosis Date   • ADHD (attention deficit hyperactivity disorder)    • Anxiety    • Colitis 2020    11/3/2020 had colonoscopy and recommend repeat in 5 years   • Depression    • Fatty liver    • GERD (gastroesophageal reflux disease)    • Hiatal hernia    • Liver hemangioma 06/15/2022   • Liver hemangioma    • Liver problem    • Migraine headache    • Panic disorder    • Peripheral neuropathy    • Psychiatric illness    • Self-injurious behavior      Past Surgical History:   Procedure Laterality Date   •  SECTION     • CHOLECYSTECTOMY     • COLONOSCOPY      / BEBETO Reynolds- Normal    • COLONOSCOPY N/A 2016    Procedure: COLONOSCOPY CPT CODE: 77454;  Surgeon: Sherry Muse DO;  Location: [facility];  Service:    • ENDOSCOPY N/A 2016    Procedure: ESOPHAGOGASTRODUODENOSCOPY WITH BIOPSY CPT CODE: 62405;  Surgeon: Sherry Muse DO;   Location: [facility] OR;  Service:    • GALLBLADDER SURGERY     • NERVE SURGERY      Right arm    • SHOULDER SURGERY     • TONSILLECTOMY     • UPPER GASTROINTESTINAL ENDOSCOPY  2010    Dr. Jeter/ Small hiatal hernia      Family History   Problem Relation Age of Onset   • No Known Problems Mother    • Diabetes Father    • Atrial fibrillation Father    • Hypertension Father    • Hyperlipidemia Father    • Drug abuse Brother    • Anxiety disorder Brother    • Alcohol abuse Brother    • Drug abuse Brother    • Depression Brother    • Heart disease Maternal Grandmother    • Bone cancer Paternal Grandfather    • Diabetes Paternal Grandfather    • Dementia Paternal Grandmother    • Heart disease Paternal Grandmother    • Diabetes Other    • Heart disease Other    • Hypertension Other      Social History     Socioeconomic History   • Marital status:      Spouse name: Davon Goel    • Number of children: 1   • Years of education: [employer]    Tobacco Use   • Smoking status: Never Smoker   • Smokeless tobacco: Never Used   Vaping Use   • Vaping Use: Never used   Substance and Sexual Activity   • Alcohol use: Yes     Comment: very limited socially   • Drug use: No   • Sexual activity: Yes     Allergies   Allergen Reactions   • Latex      Prior to Admission medications    Medication Sig Start Date End Date Taking? Authorizing Provider   Cholecalciferol (Vitamin D3) 25 MCG (1000 UT) capsule Take 1 capsule by mouth Daily. 4/12/22   Cinda Alvarado APRN   co-enzyme Q-10 30 MG capsule Take 5 capsules by mouth Daily for 30 days. 7/2/22 8/1/22  Luis Miguel Sneed MD   FLUoxetine (PROzac) 20 MG capsule Take 3 capsules by mouth Daily. 6/3/22   Kelly Nix APRN   magnesium oxide (MAG-OX) 400 MG tablet Take 1 tablet by mouth 2 (Two) Times a Day for 30 days. 7/2/22 8/1/22  Luis Miguel Sneed MD   oxyCODONE-acetaminophen (Percocet) 5-325 MG per tablet Take 1 tablet by mouth Every 6 (Six) Hours As Needed for  Severe Pain  or Moderate Pain . 22   Luis Miguel Sneed MD   pantoprazole (Protonix) 40 MG EC tablet Take 1 tablet by mouth Daily. 2/3/22   Cinda Alvarado APRN   Riboflavin 400 MG tablet Take 400 mg by mouth Daily With Breakfast. 22   Luis Miguel Sneed MD   SUMAtriptan (Imitrex) 25 MG tablet Take 1 tablet by mouth 1 Time As Needed for Migraine. Take 1 tablet at onset of headache. May repeat dose 1 time in 2 hours if headache not relieved. 22   Cinda Alvarado APRN   topiramate (Topamax) 25 MG tablet Take 1 tablet by mouth every night at bedtime. 22   Cinda Alvarado APRN   verapamil (CALAN) 40 MG tablet Take 1 pill by mouth daily and if BP tolerates then advance to BID 22   Cinda Alvarado APRN   methylphenidate (Concerta) 18 MG CR tablet Take 1 tablet by mouth Daily 6/3/22 7/1/22  Kelly Nix [facility] Meds:  Scheduled-   Infusions- No current facility-administered medications for this visit.     PRNs-     Functional Status Prior to Current Stroke/Cameron Score: 1    NIH Stroke Scale  Time: 07:58 EDT  Person Administering Scale: Christian Rondon MD  1a  Level of consciousness: 0=alert; keenly responsive   1b. LOC questions:  0=Performs both tasks correctly   1c. LOC commands: 0=Performs both tasks correctly   2.  Best Gaze: 0=normal   3.  Visual: 0=No visual loss   4. Facial Palsy: 0=Normal symmetric movement   5a.  Motor left arm: 0=No drift, limb holds 90 (or 45) degrees for full 10 seconds   5b.  Motor right arm: 1   6a. motor left le=No drift, limb holds 90 (or 45) degrees for full 10 seconds   6b  Motor right le=No drift, limb holds 90 (or 45) degrees for full 10 seconds   7. Limb Ataxia: 0=Absent   8.  Sensory: 0=Normal; no sensory loss   9. Best Language:  0=No aphasia, normal   10. Dysarthria: 0=Normal   11. Extinction and Inattention: 0=No abnormality    Total:   01      Total:   1       Results Reviewed:  I have personally reviewed current  lab, radiology, and data and agree with results.    Results for orders placed during the hospital encounter of 06/30/22    Adult Transthoracic Echo Complete W/ Cont if Necessary Per Protocol (With Agitated Saline)    Interpretation Summary  · Left ventricular ejection fraction appears to be 61 - 65%. Left ventricular systolic function is normal.  · Left ventricular diastolic function was normal.  · Saline test results are negative for right to left atrial level shunt.  · Normal cardiac valves.      Assessment/Plan:  This is 37-year-old female with active medical problems including headaches/migraines was seen as a follow-up after her recent hospitalization at [facility].    Migraine with aura, not status migrainosus, not intractable  -Continue Topamax 25 mg twice daily, caution with pregnancy.  More than 200 mg daily can cause congenital birth defects like left lip.  Patient was also well educated on side effects of Topamax-  kidney stones, paresthesias and cognitive dysfunction.    No further follow up is indicated at this time. Will see PRN.     Spent more than 45 min evaluating the patient,> 50% time spent on counseling regarding further management and care.     Christian Rondon MD  July 11, 2022  07:58 EDT    Verbal consent taken.  Patient agreeable to be seen via telemedicine.    This was an audio and video enabled telemedicine encounter.

## 2022-07-12 ENCOUNTER — TREATMENT (OUTPATIENT)
Dept: PHYSICAL THERAPY | Facility: CLINIC | Age: 37
End: 2022-07-12

## 2022-07-12 DIAGNOSIS — R53.1 ACUTE RIGHT-SIDED WEAKNESS: Primary | ICD-10-CM

## 2022-07-12 LAB
INR PPP: 1.3 (ref 0.8–1.2)
PROTHROMBIN TIME: 15.3 SECONDS (ref 12.8–15.2)

## 2022-07-12 PROCEDURE — 97112 NEUROMUSCULAR REEDUCATION: CPT | Performed by: PHYSICAL THERAPIST

## 2022-07-12 PROCEDURE — 97110 THERAPEUTIC EXERCISES: CPT | Performed by: PHYSICAL THERAPIST

## 2022-07-13 ENCOUNTER — TREATMENT (OUTPATIENT)
Dept: PHYSICAL THERAPY | Facility: CLINIC | Age: 37
End: 2022-07-13

## 2022-07-13 DIAGNOSIS — R53.1 ACUTE RIGHT-SIDED WEAKNESS: Primary | ICD-10-CM

## 2022-07-13 PROCEDURE — 97112 NEUROMUSCULAR REEDUCATION: CPT | Performed by: PHYSICAL THERAPIST

## 2022-07-13 PROCEDURE — 97110 THERAPEUTIC EXERCISES: CPT | Performed by: PHYSICAL THERAPIST

## 2022-07-13 NOTE — PROGRESS NOTES
Physical Therapy Daily Treatment Note      Patient: Diallo Spence   : 1985  Referring practitioner: Luis Miguel Sneed MD  Date of Initial Visit: Type: THERAPY  Noted: 2022  Today's Date: 2022  Patient seen for 2 sessions       Visit Diagnoses:    ICD-10-CM ICD-9-CM   1. Acute right-sided weakness  R53.1 728.87       Subjective Evaluation    History of Present Illness    Subjective comment: Patient reports that she has been working on walking without her rollator and feels like she is getting better.Pain  No pain reported           Objective   See Exercise, Manual, and Modality Logs for complete treatment.       Assessment & Plan     Assessment    Assessment details: Therapy session consisted of there ex and neuromuscular re-education.  Patient gave good effort at today's session and appears well motivated.  There ex focused on LE strengthening, with patient performing exercises in seated position.  Patient challenged with balance activities, including forward stepping and long stepping with agility dots.  Patient required min assist during balance activities for safety.  Education provided on importance of continuing to use rollator for safety, with patient verbalizing understanding.  She will continue to be progressed per her tolerance and POC.          Timed:         Manual Therapy:         mins  76658;     Therapeutic Exercise:    25     mins  17239;     Neuromuscular Leyla:    20    mins  27431;    Therapeutic Activity:          mins  89269;     Gait Training:           mins  95064;     Ultrasound:          mins  74385;    Ionto                                   mins   61550  Self Care                            mins   08411  Canalith Repos         mins 75261      Un-Timed:  Electrical Stimulation:         mins  08446 (MC );  Dry Needling          mins self-pay  Traction          mins 63795      Timed Treatment:   45   mins   Total Treatment:     45   mins    Sultana Martinez  PT  [place] License: [id number]  Electronically signed by Sultana Martinez, PT, 07/13/22, 9:50 AM EDT.

## 2022-07-13 NOTE — PROGRESS NOTES
Physical Therapy Daily Treatment Note      Patient: Diallo Spence   : 1985  Referring practitioner: Luis Miguel Sneed MD  Date of Initial Visit: Type: THERAPY  Noted: 2022  Today's Date: 2022  Patient seen for 3 sessions       Visit Diagnoses:    ICD-10-CM ICD-9-CM   1. Acute right-sided weakness  R53.1 728.87       Subjective   Patient reports that she isn't having any pain prior to treatment session. Patient states that she done good following last treatment session. Patient reports that she has been fatigued from trying to do more at home.    Objective   See Exercise, Manual, and Modality Logs for complete treatment.       Assessment/Plan  Patient tolerated treatment session good with rest breaks taken as needed by the patient. Educated patient to perform therex per her tolerance, patient verbalized understanding. No adverse reactions with treatment session. Treatment session consisted of exercises to improve strength and ROM to the LE's and activities to improve strength. Patient demonstrated difficulty with alt foot placement and required extra time to bring left foot on the step. Fatigue noted when performing seated exercises especially on the left LE.  No pain noted following treatment session. Continue per PT's POC, progress exercises per the patient's tolerance.     Timed:         Manual Therapy:         mins  70058;     Therapeutic Exercise:    25     mins  29856;     Neuromuscular Leyla:    20    mins  31453;    Therapeutic Activity:          mins  75613;     Gait Training:           mins  47492;     Ultrasound:          mins  23481;    Ionto                                   mins   46212  Self Care                            mins   48558  Canalith Repos         mins 91420      Un-Timed:  Electrical Stimulation:         mins  38319 ( );  Dry Needling          mins self-pay  Traction          mins 17880      Timed Treatment:   45   mins   Total Treatment:     45    griffin Valdez, PTA  [place] License: [id number]

## 2022-07-15 ENCOUNTER — TELEPHONE (OUTPATIENT)
Dept: PHYSICAL THERAPY | Facility: CLINIC | Age: 37
End: 2022-07-15

## 2022-07-19 ENCOUNTER — OFFICE VISIT (OUTPATIENT)
Dept: CARDIOLOGY | Facility: CLINIC | Age: 37
End: 2022-07-19

## 2022-07-19 VITALS
TEMPERATURE: 98.6 F | HEART RATE: 71 BPM | SYSTOLIC BLOOD PRESSURE: 132 MMHG | DIASTOLIC BLOOD PRESSURE: 88 MMHG | OXYGEN SATURATION: 98 % | BODY MASS INDEX: 26.2 KG/M2 | WEIGHT: 163 LBS | HEIGHT: 66 IN

## 2022-07-19 DIAGNOSIS — F41.9 ANXIETY: ICD-10-CM

## 2022-07-19 DIAGNOSIS — F90.9 ATTENTION DEFICIT HYPERACTIVITY DISORDER (ADHD), UNSPECIFIED ADHD TYPE: ICD-10-CM

## 2022-07-19 DIAGNOSIS — R53.1 RIGHT SIDED WEAKNESS: ICD-10-CM

## 2022-07-19 DIAGNOSIS — G43.401 HEMIPLEGIC MIGRAINE WITH STATUS MIGRAINOSUS, NOT INTRACTABLE: Primary | ICD-10-CM

## 2022-07-19 PROCEDURE — 99214 OFFICE O/P EST MOD 30 MIN: CPT | Performed by: NURSE PRACTITIONER

## 2022-07-19 RX ORDER — TOPIRAMATE 25 MG/1
25 TABLET ORAL 2 TIMES DAILY
Qty: 60 TABLET | Refills: 1 | Status: SHIPPED | OUTPATIENT
Start: 2022-07-19 | End: 2022-09-28

## 2022-07-19 RX ORDER — VERAPAMIL HYDROCHLORIDE 40 MG/1
TABLET ORAL
Qty: 60 TABLET | Refills: 1 | Status: SHIPPED | OUTPATIENT
Start: 2022-07-19 | End: 2022-09-28

## 2022-07-19 RX ORDER — SUMATRIPTAN 50 MG/1
TABLET, FILM COATED ORAL
Qty: 9 TABLET | Refills: 1 | Status: SHIPPED | OUTPATIENT
Start: 2022-07-19 | End: 2022-09-09 | Stop reason: SDUPTHER

## 2022-07-19 NOTE — PROGRESS NOTES
Subjective   Diallo Spence is a 37 y.o. female.   Chief Complaint   Patient presents with   • Hospital Follow Up Visit     SP Hemiplegic migraine      History of Present Illness   Wayne Spence is a 37-year-old female who presents to clinic today for follow-up.      Recent diagnosis of hemiplegic migraine resulting in right-sided weakness, she was hospitalized underwent extensive work-up to rule out stroke.  She has continued to have gradual improvement in her right-sided weakness and her speech has improved.  She has been doing outpatient physical therapy.  She feels her speech is back to normal.  Her strength in her left side is about 85% improved.  She continues to do at home physical therapy she is undecided if she is in a complete physical therapy.  She has done a televisit with neurology for follow-up after hospitalization he recommended increasing her Topamax however she reports it is difficult for her to remember her second dosing and has not been very compliant in taking twice daily dosing.  We discussed at last visit starting verapamil for her migraines however pharmacy has been out of it and she has not been able to start verapamil.  She has discontinued the metoprolol as previously instructed.  She continues on riboflavin, magnesium and co-Q10.  She is inquiring about increasing her Imitrex as she feels the 25 mg did not help significantly with her headaches.    Anxiety/depression continues on Prozac 60 mg daily.  She feels symptoms are stable.  She remains off her Concerta.  She is due to follow-up with behavioral health.  Likely    The following portions of the patient's history were reviewed and updated as appropriate: allergies, current medications, past family history, past medical history, past social history, past surgical history and problem list.    Current Outpatient Medications:   •  Cholecalciferol (Vitamin D3) 25 MCG (1000 UT) capsule, Take 1 capsule by mouth Daily., Disp: 60  capsule, Rfl: 0  •  co-enzyme Q-10 30 MG capsule, Take 5 capsules by mouth Daily for 30 days., Disp: 150 capsule, Rfl: 2  •  FLUoxetine (PROzac) 20 MG capsule, Take 3 capsules by mouth Daily., Disp: 90 capsule, Rfl: 0  •  magnesium oxide (MAG-OX) 400 MG tablet, Take 1 tablet by mouth 2 (Two) Times a Day for 30 days., Disp: 60 tablet, Rfl: 2  •  oxyCODONE-acetaminophen (Percocet) 5-325 MG per tablet, Take 1 tablet by mouth Every 6 (Six) Hours As Needed for Severe Pain  or Moderate Pain ., Disp: 30 tablet, Rfl: 0  •  pantoprazole (Protonix) 40 MG EC tablet, Take 1 tablet by mouth Daily., Disp: 90 tablet, Rfl: 1  •  Riboflavin 400 MG tablet, Take 400 mg by mouth Daily With Breakfast., Disp: 30 tablet, Rfl: 3  •  SUMAtriptan (IMITREX) 50 MG tablet, Take 1 tablet by mouth at the onset of headache, may repeat dose 1 time in 2 hours if headache not relieved., Disp: 9 tablet, Rfl: 1  •  topiramate (Topamax) 25 MG tablet, Take 1 tablet by mouth 2 (Two) Times a Day., Disp: 60 tablet, Rfl: 1  •  verapamil (CALAN) 40 MG tablet, Take 1 tablet by mouth daily and if BP tolerates, then advance to 2 times daily., Disp: 60 tablet, Rfl: 1    Review of Systems   Constitutional: Negative for activity change, appetite change, chills, diaphoresis, fatigue and fever.   HENT: Negative for congestion, drooling, ear discharge, ear pain, mouth sores, nosebleeds, postnasal drip, rhinorrhea, sinus pressure, sneezing and sore throat.    Eyes: Negative for pain, discharge and visual disturbance.   Respiratory: Negative for cough, chest tightness, shortness of breath and wheezing.    Cardiovascular: Negative for chest pain, palpitations and leg swelling.   Gastrointestinal: Negative for abdominal pain, constipation, diarrhea, nausea and vomiting.   Endocrine: Negative for cold intolerance, heat intolerance, polydipsia, polyphagia and polyuria.   Musculoskeletal: Negative for arthralgias, myalgias and neck pain.   Skin: Negative for rash and wound.    Neurological: Positive for headaches. Negative for syncope, speech difficulty, weakness and light-headedness.   Hematological: Negative for adenopathy. Does not bruise/bleed easily.   Psychiatric/Behavioral: Negative for confusion, dysphoric mood and sleep disturbance. The patient is not nervous/anxious.    All other systems reviewed and are negative.    Patient Active Problem List   Diagnosis   • Anxiety   • Depression   • Palpitations   • Gastroesophageal reflux disease without esophagitis   • Liver hemangioma   • Fatty liver   • Hiatal hernia   • Colon polyp   • ADHD   • Migraine headache   • Acute right-sided weakness   • 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     Past Medical History:   Diagnosis Date   • ADHD (attention deficit hyperactivity disorder)    • Anxiety    • Colitis 2020    11/3/2020 had colonoscopy and recommend repeat in 5 years   • Depression    • Fatty liver    • GERD (gastroesophageal reflux disease)    • Hiatal hernia    • Liver hemangioma 06/15/2022   • Liver hemangioma    • Liver problem    • Migraine headache    • Panic disorder    • Peripheral neuropathy    • Psychiatric illness    • Self-injurious behavior      Past Surgical History:   Procedure Laterality Date   •  SECTION     • CHOLECYSTECTOMY     • COLONOSCOPY      / BEBETO Reynolds- Normal    • COLONOSCOPY N/A 2016    Procedure: COLONOSCOPY CPT CODE: 67737;  Surgeon: Sherry Muse DO;  Location: [facility];  Service:    • ENDOSCOPY N/A 2016    Procedure: ESOPHAGOGASTRODUODENOSCOPY WITH BIOPSY CPT CODE: 00903;  Surgeon: Sherry Muse DO;  Location: [facility];  Service:    • GALLBLADDER SURGERY     • NERVE SURGERY      Right arm    • SHOULDER SURGERY     • TONSILLECTOMY     • UPPER GASTROINTESTINAL ENDOSCOPY      Dr. Jeter/ Small hiatal hernia      /88 (BP Location: Left arm, Patient Position: Sitting, Cuff Size: Adult)   Pulse 71   Temp 98.6 °F (37  "°C)   Ht 167.6 cm (66\")   Wt 73.9 kg (163 lb)   SpO2 98%   BMI 26.31 kg/m²     Objective   Allergies   Allergen Reactions   • Latex      Physical Exam  Vitals reviewed.   Constitutional:       Appearance: Normal appearance. She is well-developed.   HENT:      Head: Normocephalic.   Eyes:      Conjunctiva/sclera: Conjunctivae normal.   Neck:      Thyroid: No thyromegaly.      Vascular: No carotid bruit or JVD.   Cardiovascular:      Rate and Rhythm: Normal rate and regular rhythm.   Pulmonary:      Effort: Pulmonary effort is normal.      Breath sounds: Normal breath sounds.   Abdominal:      Palpations: Abdomen is soft. There is no hepatomegaly or splenomegaly.      Tenderness: There is no abdominal tenderness.   Musculoskeletal:      Cervical back: Normal range of motion and neck supple.      Right lower leg: No edema.      Left lower leg: No edema.      Comments: Strength in upper extremities is equal bilaterally  Strength is improving in right lower extremity, mild discrepancy in strength as compared to the left lower extremity.  Gait has improved.  She is no longer using a device for walking assistance.   Skin:     General: Skin is warm and dry.   Neurological:      Mental Status: She is alert and oriented to person, place, and time.      Comments: Speech is normal   Psychiatric:         Attention and Perception: Attention normal.         Mood and Affect: Mood normal.         Speech: Speech normal.         Behavior: Behavior normal. Behavior is cooperative.         Cognition and Memory: Cognition normal.       Assessment & Plan   Diagnoses and all orders for this visit:    1. Hemiplegic migraine with status migrainosus, not intractable (Primary)  Patient has not started she was unable to receive it from the pharmacy.  We will resend a prescription for verapamil 40 mg daily to start verapamil as she, if blood pressure and heart rate remained stable she can increase to 40 mg twice daily.  We will increase her " Imitrex to 50 mg to use as needed.  Recommend compliance with Topamax dosing and can gradually titrate her Topamax dosing to help with headaches.  She has been referred to migraine specialist at [facility] however the hub has been unable to reach her.  She is advised to call their office to schedule a new patient appointment.  Continue on riboflavin, magnesium and co-Q10.     2. Right sided weakness  Improved, continue physical therapy and home exercise.  She can return to work.    3. Anxiety  Stable, continue on Prozac and keep follow-up with behavioral health.    4. Attention deficit hyperactivity disorder (ADHD), unspecified ADHD type    Other orders  -     verapamil (CALAN) 40 MG tablet; Take 1 tablet by mouth daily and if BP tolerates, then advance to 2 times daily.  Dispense: 60 tablet; Refill: 1  -     topiramate (Topamax) 25 MG tablet; Take 1 tablet by mouth 2 (Two) Times a Day.  Dispense: 60 tablet; Refill: 1  -     SUMAtriptan (IMITREX) 50 MG tablet; Take 1 tablet by mouth at the onset of headache, may repeat dose 1 time in 2 hours if headache not relieved.  Dispense: 9 tablet; Refill: 1      Review of recent neurology telemedicine note.  Follow-up in 3 months, sooner if needed.

## 2022-08-04 DIAGNOSIS — R93.89 ABNORMAL CHEST X-RAY: Primary | ICD-10-CM

## 2022-08-26 NOTE — THERAPY EVALUATION
Acute Care - Speech Language Pathology Initial Evaluation  [facility]   Cognitive-Communication Evaluation  & Swallow Treatment     Patient Name: Diallo Spence  : 1985  MRN: [id number]  Today's Date: 2022               Admit Date: 2022     Visit Dx:    ICD-10-CM ICD-9-CM   1. Acute right-sided weakness  R53.1 728.87   2. Expressive aphasia  R47.01 784.3   3. Fluency disorder  R47.89 784.59     Patient Active Problem List   Diagnosis   • Anxiety   • Depression   • Palpitations   • Gastroesophageal reflux disease without esophagitis   • Liver hemangioma   • Fatty liver   • Hiatal hernia   • Colon polyp   • ADHD   • Migraine headache   • Acute right-sided weakness   • 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     Past Medical History:   Diagnosis Date   • ADHD (attention deficit hyperactivity disorder)    • Anxiety    • Colitis 2020    11/3/2020 had colonoscopy and recommend repeat in 5 years   • Depression    • Fatty liver    • GERD (gastroesophageal reflux disease)    • Hiatal hernia    • Liver hemangioma 06/15/2022   • Liver hemangioma    • Liver problem    • Migraine headache    • Panic disorder    • Peripheral neuropathy    • Psychiatric illness    • Self-injurious behavior      Past Surgical History:   Procedure Laterality Date   •  SECTION     • CHOLECYSTECTOMY     • COLONOSCOPY      / BEBETO Reynolds- Normal    • COLONOSCOPY N/A 2016    Procedure: COLONOSCOPY CPT CODE: 87519;  Surgeon: Sherry Muse DO;  Location: [facility] OR;  Service:    • ENDOSCOPY N/A 2016    Procedure: ESOPHAGOGASTRODUODENOSCOPY WITH BIOPSY CPT CODE: 26170;  Surgeon: Sherry Muse DO;  Location: [facility] OR;  Service:    • GALLBLADDER SURGERY     • NERVE SURGERY      Right arm    • SHOULDER SURGERY     • TONSILLECTOMY     • UPPER GASTROINTESTINAL ENDOSCOPY      Dr. Jeter/ Small hiatal hernia        SLP Recommendation and Plan  SLP Diagnosis:  Moderate-severe fluency disorder (07/01/22 1415)        Swallow Criteria for Skilled Therapeutic Interventions Met: demonstrates skilled criteria (06/30/22 1630)  SLC Criteria for Skilled Therapy Interventions Met: yes (07/01/22 1415)  Anticipated Discharge Disposition (SLP): anticipate therapy at next level of care, home with OP services (07/01/22 1415)     Therapy Frequency (Swallow): PRN (06/30/22 1630)  Predicted Duration Therapy Intervention (Days): until discharge (07/01/22 1415)  Daily Summary of Progress (SLP): prepare for discharge (07/01/22 1440)           Treatment Assessment (SLP): No overt clinical s/sxs oropharyngeal dysphagia. Pt reported lingual numbness has resolved and swallowing has returned to normal. (07/01/22 1440)  Plan for Continued Treatment (SLP): treatment no longer indicated as all goals met (07/01/22 1440)  Plan of Care Reviewed With: patient, mother (07/01/22 1517)  Progress: no change (07/01/22 1517)      SLP EVALUATION (last 72 hours)     SLP SLC Evaluation     Row Name 07/01/22 1415                   Communication Assessment/Intervention    Document Type evaluation  -AC        Subjective Information no complaints  -AC        Patient Observations alert;cooperative  -AC        Patient/Family/Caregiver Comments/Observations Mother present.  -AC        Patient Effort good  -AC                  General Information    Patient Profile Reviewed yes  -AC        Pertinent History Of Current Problem See previous eval.  -AC        Precautions/Limitations, Vision WFL with corrective lenses;for purposes of eval  -AC        Precautions/Limitations, Hearing WFL;for purposes of eval  -AC        Prior Level of Function-Communication WFL  -AC        Plans/Goals Discussed with patient and family;agreed upon  -AC        Barriers to Rehab none identified  -AC        Patient's Goals for Discharge patient did not state  -AC        Family Goals for Discharge family did not state  -AC                  Pain  Scale: Numbers Pre/Post-Treatment    Pretreatment Pain Rating 0/10 - no pain  -AC        Posttreatment Pain Rating 0/10 - no pain  -AC                  Comprehension Assessment/Intervention    Comprehension Assessment/Intervention Auditory Comprehension;Reading Comprehension  -AC                  Auditory Comprehension Assessment/Intervention    Auditory Comprehension (Communication) WFL  -AC        Answers Questions (Communication) WFL;complex;yes/no  -AC        Able to Follow Commands (Communication) WFL;2-step  -AC        Narrative Discourse WFL;conversational level  -AC                  Reading Comprehension Assessment/Intervention    Reading Comprehension (Communication) WFL  -AC        Functional Reading Tasks WFL  -AC                  Expression Assessment/Intervention    Expression Assessment/Intervention graphic expression;verbal expression  -AC                  Verbal Expression Assessment/Intervention    Verbal Expression moderate impairment;severe impairment  -AC        Automatic Speech (Communication) WFL;response to greeting  -AC        Repetition WFL;sounds  -AC        Responsive Naming WFL;complex  -AC        Confrontational Naming WFL;low frequency  -AC        Verbal Expression, Comment No detectable language impairments; however, pt exhibited dyfluencies--primarily part and whole word repetitions. Occasional blocks. Stimulable for easy onset, light articulatory contact, and diaphragmatic breathing strategies. Handouts provided.  -AC                  Graphic Expression Assessment/Intervention    Graphic Expression unable/difficult to assess;other (see comments)  2' R hand weakness/coordination difficulty  -AC        Biographical Information WFL;name  -AC                  Motor Speech Assessment/Intervention    Motor Speech Function unable/difficult to assess;other (see comments)  2' severity of fluency disorder, but no obvious dysarthria characteristics  -AC                  Cognitive Assessment  Intervention- SLP    Orientation Status (Cognition) WFL;person;place;time;situation  -        Attention (Cognitive) WFL;sustained  -AC                  SLP Evaluation Clinical Impressions    SLP Diagnosis Moderate-severe fluency disorder  -AC        Rehab Potential/Prognosis good  -        SLC Criteria for Skilled Therapy Interventions Met yes  -AC        Functional Impact difficulty communicating in an emergency;difficulty in expressing complex messages;restrictions in personal and social life;difficulty completing vocational tasks  -AC                  Recommendations    Therapy Frequency (SLP SLC) 5 days per week  -AC        Predicted Duration Therapy Intervention (Days) until discharge  -AC        Anticipated Discharge Disposition (SLP) anticipate therapy at next level of care;home with OP services  -              User Key  (r) = Recorded By, (t) = Taken By, (c) = Cosigned By    Initials Name Effective Dates    AC Rosalva Galvez MS CCC-SLP 06/16/21 -                    EDUCATION  The patient has been educated in the following areas:     Cognitive Impairment Communication Impairment.           SLP GOALS     Row Name 07/01/22 1415             Additional Goal 1 (SLP)    Additional Goal 1, SLP LTG: Pt will demonstrate functional communication skills for return to home/work responsibilities independently.  -AC      Time Frame (Additional Goal 1, SLP) by discharge  -AC              Additional Goal 2 (SLP)    Additional Goal 2, SLP Pt will demonstrate adequate use of diaphragmatic breathing 9/10 trials independently.  -AC      Time Frame (Additional Goal 2, SLP) short term goal (STG)  -AC              Additional Goal (SLP)    Additional Goal, SLP Pt will demonstrate adequate use of fluency enhancing strategies (easy onset, light artic contact) at the sentence level 9/10 trials independently.  -AC      Time Frame (Additional Goal, SLP) short term goal (STG)  -AC            User Key  (r) = Recorded By, (t) = Taken  By, (c) = Cosigned By    Initials Name Provider Type    Rosalva Dewitt, MS CCC-SLP Speech and Language Pathologist                        Time Calculation:      Time Calculation- SLP     Row Name 22 1518             Time Calculation- SLP    SLP Start Time 1415  -AC      SLP Received On 22  -AC              Untimed Charges    30373-PB Eval Speech and Production w/ Language Minutes 51  -AC      22506-YR Treatment Swallow Minutes 15  -AC              Total Minutes    Untimed Charges Total Minutes 66  -AC       Total Minutes 66  -AC            User Key  (r) = Recorded By, (t) = Taken By, (c) = Cosigned By    Initials Name Provider Type    Rosalva Dewitt, MS CCC-SLP Speech and Language Pathologist                Therapy Charges for Today     Code Description Service Date Service Provider Modifiers Qty    62641366027 HC ST EVAL ORAL PHARYNG SWALLOW 3 2022 Rosalva Galvez, MS CCC-SLP GN 1    96230025135 HC ST EVAL SPEECH AND PROD W LANG  3 2022 Rosalva Galvez, MS CCC-SLP GN 1    [id number] HC ST TREATMENT SWALLOW 1 2022 Rosalva Galvez, MS CCC-SLP GN 1                     Rosalva Galvez MS CCC-SLP  2022 and Acute Care - Speech Language Pathology   Swallow Treatment Note  [place]     Patient Name: Diallo Spence  : 1985  MRN: [id number]  Today's Date: 2022               Admit Date: 2022    Visit Dx:     ICD-10-CM ICD-9-CM   1. Acute right-sided weakness  R53.1 728.87   2. Expressive aphasia  R47.01 784.3   3. Fluency disorder  R47.89 784.59     Patient Active Problem List   Diagnosis   • Anxiety   • Depression   • Palpitations   • Gastroesophageal reflux disease without esophagitis   • Liver hemangioma   • Fatty liver   • Hiatal hernia   • Colon polyp   • ADHD   • Migraine headache   • Acute right-sided weakness   • 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     Past Medical History:   Diagnosis Date   • ADHD (attention deficit hyperactivity  disorder)    • Anxiety    • Colitis 2020    11/3/2020 had colonoscopy and recommend repeat in 5 years   • Depression    • Fatty liver    • GERD (gastroesophageal reflux disease)    • Hiatal hernia    • Liver hemangioma 06/15/2022   • Liver hemangioma    • Liver problem    • Migraine headache    • Panic disorder    • Peripheral neuropathy    • Psychiatric illness    • Self-injurious behavior      Past Surgical History:   Procedure Laterality Date   •  SECTION     • CHOLECYSTECTOMY     • COLONOSCOPY      / BEBETO Reynolds- Normal    • COLONOSCOPY N/A 2016    Procedure: COLONOSCOPY CPT CODE: 87473;  Surgeon: Sherry Muse DO;  Location: [facility] OR;  Service:    • ENDOSCOPY N/A 2016    Procedure: ESOPHAGOGASTRODUODENOSCOPY WITH BIOPSY CPT CODE: 85487;  Surgeon: Sherry Muse DO;  Location: [facility] OR;  Service:    • GALLBLADDER SURGERY     • NERVE SURGERY      Right arm    • SHOULDER SURGERY     • TONSILLECTOMY     • UPPER GASTROINTESTINAL ENDOSCOPY      Dr. Jeter/ Small hiatal hernia        SLP Recommendation and Plan  SLP Swallowing Diagnosis: mild, oral dysphagia, R/O pharyngeal dysphagia (22 1630)  SLP Diet Recommendation: regular textures, thin liquids (22 1440)  Recommended Precautions and Strategies: upright posture during/after eating, general aspiration precautions, reflux precautions (22 1630)  SLP Rec. for Method of Medication Administration: meds whole, with thin liquids, with pudding or applesauce, as tolerated (22 1630)     Monitor for Signs of Aspiration: yes, notify SLP if any concerns (22 1630)     Swallow Criteria for Skilled Therapeutic Interventions Met: demonstrates skilled criteria (22 1630)  Anticipated Discharge Disposition (SLP): anticipate therapy at next level of care, home with OP services (22 1415)  Rehab Potential/Prognosis, Swallowing: good, to achieve stated therapy goals (22 1630)  Therapy  Frequency (Swallow): PRN (06/30/22 1630)  Predicted Duration Therapy Intervention (Days): until discharge (07/01/22 1415)     Daily Summary of Progress (SLP): prepare for discharge (07/01/22 1440)               Treatment Assessment (SLP): No overt clinical s/sxs oropharyngeal dysphagia. Pt reported lingual numbness has resolved and swallowing has returned to normal. (07/01/22 1440)  Plan for Continued Treatment (SLP): treatment no longer indicated as all goals met (07/01/22 1440)         Plan of Care Reviewed With: patient, mother  Progress: no change      SWALLOW EVALUATION (last 72 hours)     SLP Adult Swallow Evaluation     Row Name 07/01/22 1440 06/30/22 1630                Rehab Evaluation    Document Type therapy note (daily note)  -AC evaluation  -AC       Subjective Information -- no complaints  -AC       Patient Observations -- alert;cooperative  -       Patient/Family/Caregiver Comments/Observations -- Parents arrived @ end of eval.  -AC       Patient Effort -- good  -AC                General Information    Patient Profile Reviewed -- yes  -AC       Pertinent History Of Current Problem -- R/o CVA. S/p TNK.  -AC       Current Method of Nutrition -- NPO  -AC       Precautions/Limitations, Vision -- WFL with corrective lenses;for purposes of eval  -AC       Precautions/Limitations, Hearing -- WFL;for purposes of eval  -AC       Prior Level of Function-Communication -- WFL  -AC       Prior Level of Function-Swallowing -- no diet consistency restrictions;esophageal concerns;other (see comments)  hx small HH--occasionally symptomatic  -AC       Plans/Goals Discussed with -- patient;agreed upon  -       Barriers to Rehab -- none identified  -       Patient's Goals for Discharge -- patient did not state  -AC       Family Goals for Discharge -- family did not state  -AC                Pain    Additional Documentation -- Pain Scale: Numbers Pre/Post-Treatment (Group)  -AC                Pain Scale: Numbers  Pre/Post-Treatment    Pretreatment Pain Rating 0/10 - no pain  -AC 0/10 - no pain  -AC       Posttreatment Pain Rating 0/10 - no pain  -AC 0/10 - no pain  -AC                Oral Motor Structure and Function    Dentition Assessment -- natural, present and adequate  -AC       Secretion Management -- WNL/WFL  -AC       Mucosal Quality -- moist, healthy  -AC       Volitional Swallow -- WFL  -AC       Volitional Cough -- WFL  -AC                Oral Musculature and Cranial Nerve Assessment    Oral Motor General Assessment -- lingual impairment;oral labial or buccal impairment  -       Lingual Impairment, Detail. Cranial Nerves IX, XII (Glossopharyngeal and Hypoglossal) -- reduced lingual ROM  -AC       Oral Motor, Comment -- tremulous labial/lingual movements  -                General Eating/Swallowing Observations    Respiratory Support Currently in Use -- room air  -AC       Eating/Swallowing Skills -- fed by SLP;other (see comments);self-fed  difficulty self-feeding 2' location of IVs  -AC       Positioning During Eating -- upright 90 degree;upright in bed  -       Utensils Used -- spoon;straw  -       Consistencies Trialed -- thin liquids;pureed;regular textures  -                Clinical Swallow Eval    Oral Prep Phase -- impaired  -AC       Oral Transit -- WFL  -AC       Oral Residue -- WFL  -AC       Pharyngeal Phase -- no overt signs/symptoms of pharyngeal impairment  -       Clinical Swallow Evaluation Summary -- Pt reported oral prep/transit felt different than normal; however, appeared adequate. No significant oral residue and no overt clinical s/sxs aspiration w/ any consistency--even when pushed.  -                Oral Prep Concerns    Oral Prep Concerns -- increased prep time  -AC       Increased Prep Time -- all consistencies  -AC                SLP Evaluation Clinical Impression    SLP Swallowing Diagnosis -- mild;oral dysphagia;R/O pharyngeal dysphagia  -       Functional Impact -- risk  of aspiration/pneumonia  -       Rehab Potential/Prognosis, Swallowing -- good, to achieve stated therapy goals  -       Swallow Criteria for Skilled Therapeutic Interventions Met -- demonstrates skilled criteria  -                SLP Treatment Clinical Impressions    Treatment Assessment (SLP) No overt clinical s/sxs oropharyngeal dysphagia. Pt reported lingual numbness has resolved and swallowing has returned to normal.  - --       Daily Summary of Progress (SLP) prepare for discharge  - --       Plan for Continued Treatment (SLP) treatment no longer indicated as all goals met  - --       Care Plan Review evaluation/treatment results reviewed;care plan/treatment goals reviewed;risks/benefits reviewed;current/potential barriers reviewed;patient/other agree to care plan  - --       Care Plan Review, Other Participant(s) mother  - --                Recommendations    Therapy Frequency (Swallow) -- PRN  -       Predicted Duration Therapy Intervention (Days) -- until discharge  -       SLP Diet Recommendation regular textures;thin liquids  - soft textures;whole;thin liquids  -       Recommended Precautions and Strategies -- upright posture during/after eating;general aspiration precautions;reflux precautions  -       Oral Care Recommendations -- Oral Care BID/PRN  -       SLP Rec. for Method of Medication Administration -- meds whole;with thin liquids;with pudding or applesauce;as tolerated  -       Monitor for Signs of Aspiration -- yes;notify SLP if any concerns  -       Anticipated Discharge Disposition (SLP) -- anticipate therapy at next level of care  -             User Key  (r) = Recorded By, (t) = Taken By, (c) = Cosigned By    Initials Name Effective Dates     Rosalva Galvez MS CCC-SLP 06/16/21 -                 EDUCATION  The patient has been educated in the following areas:   Dysphagia (Swallowing Impairment).     Please see flowsheet data for full details regarding  long-term and short-term goals.    SLP GOALS     Row Name 07/01/22 1415             Additional Goal 1 (SLP)    Additional Goal 1, SLP LTG: Pt will demonstrate functional communication skills for return to home/work responsibilities independently.  -AC      Time Frame (Additional Goal 1, SLP) by discharge  -AC              Additional Goal 2 (SLP)    Additional Goal 2, SLP Pt will demonstrate adequate use of diaphragmatic breathing 9/10 trials independently.  -AC      Time Frame (Additional Goal 2, SLP) short term goal (STG)  -AC              Additional Goal (SLP)    Additional Goal, SLP Pt will demonstrate adequate use of fluency enhancing strategies (easy onset, light artic contact) at the sentence level 9/10 trials independently.  -AC      Time Frame (Additional Goal, SLP) short term goal (STG)  -AC            User Key  (r) = Recorded By, (t) = Taken By, (c) = Cosigned By    Initials Name Provider Type    Rosalva Dewitt MS CCC-SLP Speech and Language Pathologist                   Time Calculation:    Time Calculation- SLP     Row Name 07/01/22 1518             Time Calculation- SLP    SLP Start Time 1415  -AC      SLP Received On 07/01/22  -AC              Untimed Charges    41921-RC Eval Speech and Production w/ Language Minutes 51  -AC      14517-QL Treatment Swallow Minutes 15  -AC              Total Minutes    Untimed Charges Total Minutes 66  -AC       Total Minutes 66  -AC            User Key  (r) = Recorded By, (t) = Taken By, (c) = Cosigned By    Initials Name Provider Type     Rosalva Galvez MS CCC-SLP Speech and Language Pathologist                Therapy Charges for Today     Code Description Service Date Service Provider Modifiers Qty    11147694524 HC ST EVAL ORAL PHARYNG SWALLOW 3 6/30/2022 Rosalva Galvez MS CCC-SLP GN 1    [id number] HC ST EVAL SPEECH AND PROD W LANG  3 7/1/2022 Rosalva Galvez MS CCC-SLP GN 1    [id number] HC ST TREATMENT SWALLOW 1 7/1/2022 Rosalva Galvez MS CCC-SLP GN 1         Patient was not wearing a face mask and did not exhibit coughing during this therapy encounter.  Procedure performed was aerosolizing, involved close contact (within 6 feet for at least 15 minutes or longer), and did not involve contact with infectious secretions or specimens.  Therapist used appropriate personal protective equipment including gloves, standard procedure mask and eye protection.  Appropriate PPE was worn during the entire therapy session.  Hand hygiene was completed before and after therapy session.          Rosalva Galvez MS CCC-SLP  7/1/2022   [phone]

## 2022-09-09 RX ORDER — SUMATRIPTAN 50 MG/1
TABLET, FILM COATED ORAL
Qty: 9 TABLET | Refills: 1 | Status: SHIPPED | OUTPATIENT
Start: 2022-09-09 | End: 2022-11-09

## 2022-09-28 ENCOUNTER — SPECIALTY PHARMACY (OUTPATIENT)
Dept: PHARMACY | Facility: TELEHEALTH | Age: 37
End: 2022-09-28

## 2022-09-28 ENCOUNTER — OFFICE VISIT (OUTPATIENT)
Dept: NEUROLOGY | Facility: CLINIC | Age: 37
End: 2022-09-28

## 2022-09-28 ENCOUNTER — DOCUMENTATION (OUTPATIENT)
Dept: PHARMACY | Facility: TELEHEALTH | Age: 37
End: 2022-09-28

## 2022-09-28 VITALS
SYSTOLIC BLOOD PRESSURE: 140 MMHG | BODY MASS INDEX: 26.2 KG/M2 | DIASTOLIC BLOOD PRESSURE: 92 MMHG | HEART RATE: 81 BPM | WEIGHT: 163 LBS | OXYGEN SATURATION: 100 % | HEIGHT: 66 IN

## 2022-09-28 DIAGNOSIS — G43.109 COMPLICATED MIGRAINE: ICD-10-CM

## 2022-09-28 DIAGNOSIS — G43.119 INTRACTABLE MIGRAINE WITH AURA WITHOUT STATUS MIGRAINOSUS: Primary | ICD-10-CM

## 2022-09-28 PROCEDURE — 99214 OFFICE O/P EST MOD 30 MIN: CPT | Performed by: PSYCHIATRY & NEUROLOGY

## 2022-09-28 RX ORDER — FREMANEZUMAB-VFRM 225 MG/1.5ML
225 INJECTION SUBCUTANEOUS
Qty: 1.5 ML | Refills: 11 | Status: SHIPPED | OUTPATIENT
Start: 2022-09-28 | End: 2023-02-24

## 2022-09-28 NOTE — PROGRESS NOTES
Subjective:    CC: Diallo Spence is seen today in consultation at the request of JAX Jamison for Migraine       HPI:  Patient is a 37-year-old female with past medical history of migraines referred to clinic to establish care for migraines.  She reports that she has had migraines for several years now.  She reports on an average 4-5 migraine days in a week.  She reports her typical migraine starts in the right temporal region associated with sharp pain and describes pain intensity is 10 out of 10.  She also reports associated light sensitivity, nausea but no vomiting.  She reports her typical triggers are stress, certain strong smells.  Back in July 2022, while she was at work, she is experienced typical migraine however she started experiencing associated difficulty with speech, right hemibody paresthesias and became significantly weak on her right side for which 911 had to be called and she then was brought to [facility] ED for further evaluation.  Code stroke was called and she was taken for CT perfusion, CT angiogram brain and neck.  All initial work-up was negative and she was considered a candidate for IV TNKase.  She was then admitted to ICU for observation.  Post TNKase CT was negative.  She continued to remain weak on her right side so she was eventually discharged home with outpatient physical therapy.  She reports that with physical therapy she has had about 80% improvement in right-sided weakness.  She is still having frequent migraines though.  She reports that she was started on Topamax and verapamil soon after discharge but both the medications made her really drowsy, sleepy and tired so she has stopped taking it.  She is also taking Imitrex 50 mg as needed as an abortive treatment but it does not really work well.  She usually has to take 2 doses of Imitrex followed by Excedrin or ibuprofen.  There is no family history of migraines.    The following portions of the patient's  history were reviewed today and updated as of 2022  : allergies, social history and problem list.  This document will be scanned to patient's chart.      Current Outpatient Medications:   •  Cholecalciferol (Vitamin D3) 25 MCG (1000 UT) capsule, Take 1 capsule by mouth Daily., Disp: 60 capsule, Rfl: 0  •  FLUoxetine (PROzac) 20 MG capsule, Take 3 capsules by mouth Daily., Disp: 90 capsule, Rfl: 0  •  pantoprazole (Protonix) 40 MG EC tablet, Take 1 tablet by mouth Daily., Disp: 90 tablet, Rfl: 1  •  Riboflavin 400 MG tablet, Take 400 mg by mouth Daily With Breakfast., Disp: 30 tablet, Rfl: 3  •  SUMAtriptan (IMITREX) 50 MG tablet, Take 1 tablet by mouth at the onset of headache, may repeat dose 1 time in 2 hours if headache not relieved., Disp: 9 tablet, Rfl: 1   Past Medical History:   Diagnosis Date   • ADHD (attention deficit hyperactivity disorder)    • Anxiety    • Colitis 2020    11/3/2020 had colonoscopy and recommend repeat in 5 years   • Depression    • Fatty liver    • GERD (gastroesophageal reflux disease)    • Hiatal hernia    • Liver hemangioma 06/15/2022   • Liver hemangioma    • Liver problem    • Migraine headache    • Panic disorder    • Peripheral neuropathy    • Psychiatric illness    • Self-injurious behavior       Past Surgical History:   Procedure Laterality Date   •  SECTION     • CHOLECYSTECTOMY     • COLONOSCOPY      / BEBETO Reynolds- Normal    • COLONOSCOPY N/A 2016    Procedure: COLONOSCOPY CPT CODE: 26807;  Surgeon: Sherry Muse DO;  Location:  [facility] OR;  Service:    • ENDOSCOPY N/A 2016    Procedure: ESOPHAGOGASTRODUODENOSCOPY WITH BIOPSY CPT CODE: 06409;  Surgeon: Sherry Muse DO;  Location:  [facility] OR;  Service:    • GALLBLADDER SURGERY     • NERVE SURGERY      Right arm    • SHOULDER SURGERY     • TONSILLECTOMY     • UPPER GASTROINTESTINAL ENDOSCOPY      Dr. Jeter/ Small hiatal hernia       Family History   Problem Relation Age  "of Onset   • No Known Problems Mother    • Diabetes Father    • Atrial fibrillation Father    • Hypertension Father    • Hyperlipidemia Father    • Drug abuse Brother    • Anxiety disorder Brother    • Alcohol abuse Brother    • Drug abuse Brother    • Depression Brother    • Heart disease Maternal Grandmother    • Bone cancer Paternal Grandfather    • Diabetes Paternal Grandfather    • Dementia Paternal Grandmother    • Heart disease Paternal Grandmother    • Diabetes Other    • Heart disease Other    • Hypertension Other       Review of Systems   Neurological: Positive for weakness.       All other systems reviewed and are negative     Objective:    /92   Pulse 81   Ht 167.6 cm (65.98\")   Wt 73.9 kg (163 lb)   SpO2 100%   BMI 26.32 kg/m²     Neurology Exam:    General apperance: NAD.     Mental status: Alert, awake and oriented to time place and person.    Recent and Remote memory: Can recall 3/3 objects at 5 minutes. Can recall historical events.     Attention span and Concentration: Serial 7s: Normal.     Fund of knowledge:  Normal.     Language and Speech: No aphasia or dysarthria.    Naming , Repitition and Comprehension:  Can name objects, repeat a sentence and follow commands. Speech is clear and fluent with good repetition, comprehension, and naming.    Cranial Nerves:   CN II: Visual fields are full. Intact. Fundi - Normal, No papillederma, Pupils - OVIDIO  CN III, IV and VI: Extraocular movements are intact. Normal saccades.   CN V: Facial sensation is intact.   CN VII: Muscles of facial expression reveal no asymmetry. Intact.   CN VIII: Hearing is intact. Whispered voice intact.   CN IX and X: Palate elevates symmetrically. Intact  CN XI: Shoulder shrug is intact.   CN XII: Tongue is midline without evidence of atrophy or fasciculation.     Motor:  Right UE muscle strength 5-/5. Normal tone.  Giveaway weakness noted.    Left UE muscle strength 5/5. Normal tone.      Right LE muscle strength " 5-/5. Normal tone.  Giveaway weakness noted.    Left LE muscle strength 5/5. Normal tone.      Sensory: Normal light touch, vibration and pinprick sensation bilaterally.    DTRs: 2+ bilaterally in upper and lower extremities.    Babinski: Negative bilaterally.    Co-ordination: Normal finger-to-nose, heel to shin B/L.    Rhomberg: Negative.    Gait: Normal.    Cardiovascular: Regular rate and rhythm without murmur, gallop or rub.    Ophthalmoscopic exam: Normal fundi, no papilledema.    Assessment and Plan:  1. Intractable migraine with aura without status migrainosus  2. Complicated migraine  Patient with a long-term history of migraines and recent admission to the hospital because of complex/complicated migraine causing right-sided hemibody paresthesias and weakness as well as aphasia.  Speech is back to normal.  Right-sided strength is about 80% back to baseline.  I have advised her to continue doing exercises as much as possible and have assured her that this will get back to normal eventually.  Since she is reporting 4-5 migraines in a week and has failed Topamax and verapamil, I am going to start her on Ajovy once a month injection for migraine prevention.  We will also start her on Ubrelvy 100 mg to be taken as needed as an abortive treatment as Imitrex does not seem to be working well.  Based on the response to this combination, further treatment options will be discussed with her.  I will see her back in clinic in 6 weeks for follow-up.       Return in about 6 weeks (around 11/9/2022).     Joao Shaikh MD

## 2022-09-28 NOTE — PROGRESS NOTES
Specialty Pharmacy Refill Coordination Note     Diallo is a 37 y.o. female contacted today regarding refills of  Ajovy, Ubrelvy specialty medication(s).    Reviewed and verified with patient:   Specialty medication(s) and dose(s) confirmed: yes    Refill Questions    Flowsheet Row Most Recent Value   Changes to allergies? No   Changes to medications? No   New conditions since last clinic visit No   Unplanned office visit, urgent care, ED, or hospital admission in the last 4 weeks  No   How does patient/caregiver feel medication is working? Fair  [new start]   Financial problems or insurance changes  No   Since the previous refill, were any specialty medication doses or scheduled injections missed or delayed?  No  [new start]   Does this patient require a clinical escalation to a pharmacist? No          Delivery Questions    Flowsheet Row Most Recent Value   Delivery method Other (Comment)  [Dr. Woodward Office  Attn: Diallo Spence  [address] [place] [address], [address]  [place] [id number]]   Delivery address correct? Yes   Number of medications in delivery 2   Medication being filled and delivered Ajovy, Ubrelvy   Doses left of specialty medications 0   Cooler needed? Yes   Are any medications first time fills? Yes                 Follow-up: 25 & 90 day(s)     Dayanara Espino, Pharmacy Technician  Specialty Pharmacy Technician

## 2022-09-28 NOTE — PROGRESS NOTES
Specialty Pharmacy Patient Management Program  Initial Assessment     Diallo Spence is a 37 y.o. female with chronic migraine and enrolled in the Patient Management program offered by [facility].  An initial outreach was conducted, including assessment of therapy appropriateness and specialty medication education for Ajovy 225mg/1.5ml and Ubrelvy 100mg. The patient was introduced to services offered by [facility], including: regular assessments, refill coordination, curbside pick-up or mail order delivery options, prior authorization maintenance, and financial assistance programs as applicable. The patient was also provided with contact information for the pharmacy team.     Insurance Coverage & Financial Support  Covered under [employer] insurance and copay assitance for Ajovy     Relevant Past Medical History and Comorbidities  Relevant medical history and concomitant health conditions were discussed with the patient. The patient's chart has been reviewed for relevant past medical history and comorbid health conditions and updated as necessary.   Past Medical History:   Diagnosis Date   • ADHD (attention deficit hyperactivity disorder)    • Anxiety    • Colitis 07/2020    11/3/2020 had colonoscopy and recommend repeat in 5 years   • Depression    • Fatty liver    • GERD (gastroesophageal reflux disease)    • Hiatal hernia    • Liver hemangioma 06/15/2022   • Liver hemangioma    • Liver problem    • Migraine headache    • Panic disorder    • Peripheral neuropathy    • Psychiatric illness    • Self-injurious behavior      Social History     Socioeconomic History   • Marital status:      Spouse name: Davon Goel    • Number of children: 1   • Years of education: [employer]    Tobacco Use   • Smoking status: Never Smoker   • Smokeless tobacco: Never Used   Vaping Use   • Vaping Use: Never used   Substance and Sexual Activity   • Alcohol  use: Yes     Comment: very limited socially   • Drug use: No   • Sexual activity: Yes       Allergies  Known allergies and reactions were discussed with the patient. The patient's chart has been reviewed for  allergy information and updated as necessary.   Latex    Current Medication List  This medication list has been reviewed with the patient and evaluated for any interactions or necessary modifications/recommendations, and updated to include all prescription medications, OTC medications, and supplements the patient is currently taking.  This list reflects what is contained in the patient's profile, which has also been marked as reviewed to communicate to other providers it is the most up to date version of the patient's current medication therapy.     Current Outpatient Medications:   •  Cholecalciferol (Vitamin D3) 25 MCG (1000 UT) capsule, Take 1 capsule by mouth Daily., Disp: 60 capsule, Rfl: 0  •  FLUoxetine (PROzac) 20 MG capsule, Take 3 capsules by mouth Daily., Disp: 90 capsule, Rfl: 0  •  Fremanezumab-vfrm (Ajovy) 225 MG/1.5ML solution auto-injector, Inject 225 mg under the skin into the appropriate area as directed Every 30 (Thirty) Days., Disp: 1.5 mL, Rfl: 11  •  pantoprazole (Protonix) 40 MG EC tablet, Take 1 tablet by mouth Daily., Disp: 90 tablet, Rfl: 1  •  Riboflavin 400 MG tablet, Take 400 mg by mouth Daily With Breakfast., Disp: 30 tablet, Rfl: 3  •  SUMAtriptan (IMITREX) 50 MG tablet, Take 1 tablet by mouth at the onset of headache, may repeat dose 1 time in 2 hours if headache not relieved., Disp: 9 tablet, Rfl: 1  •  ubrogepant (ubrogepant) 100 MG tablet, Take 1 tablet by mouth As Needed (Migraine) for up to 30 days., Disp: 16 tablet, Rfl: 3    Drug Interactions  none     Relevant Laboratory Values  No results for input(s): CMP, BMP, CBC in the last 72 hours.    Initial Education Provided for Specialty Medication  The patient has been provided with the following education and any applicable  administration techniques (i.e. self-injection) have been demonstrated for the therapies indicated. All questions and concerns have been addressed prior to the patient receiving the medication, and the patient has verbalized understanding of the education and any materials provided.  Additional patient education shall be provided and documented upon request by the patient, provider or payer.                   Ajovy (fremanezumab-vfrm) 225 mg subQ every 28 days           Medication Expectations   Why am I taking this medication? You are taking this medication for migraine prophylaxis.   What should I expect while on this medication? You should expect to a decrease in the frequency and severity of your migraines.   How does the medication work? Ajovy is a monoclonal antibody that binds to calcitonin gene-related peptide (CGRP) and blocks its binding to the receptor decreasing the severity of migraines.   How long will I be on this medication for? The amount of time you will be on this medication will be determined by your doctor and your response to the medication.    How do I take this medication? Take as directed on your prescription label. This medication is a self-injection given every 28 days.    What are some possible side effects? Injection site reactions and hypersensitivity reactions.   What happens if I miss a dose? If you miss a dose, take it as soon as you remember, and time next dose 28 days from last dose.                  Medication Safety   What are things I should warn my doctor immediately about? Cases of anaphylaxis and angioedema have been reported in the postmarketing setting. If a reaction occurs, notify your doctor immediately.   What are things that I should be cautious of? Injection site reaction and hypersensitivity reactions, including rash, pruritus, drug hypersensitivity, and urticaria   What are some medications that can interact with this one? No drug interactions identified.             Medication Storage/Handling   How should I handle this medication? Keep this medication our of reach of pets/children in original container.  On the day your Ajovy is due let it set at room temperature for 30 minutes prior to injection. (do NOT warm using a heat source such as hot water or a microwave).  Administer in the abdomen, thigh, back of the upper arm, or buttocks.  Do not inject where the skin is tender, bruised, red or hard.  Rotate injection sites.   How does this medication need to be stored? Store in refrigerator and keep dry.   How should I dispose of this medication? You can dispose of the empty syringe in a sharps container, and if this is not available you may use an empty hard plastic container such as a milk jug or tide container.            Resources/Support   How can I remind myself to take this medication? You can download a reminder marielle on your phone or use a calandar  to help with your monthly injection.   Is financial support available?  Yes, [employer] can provide co-pay cards if you have commercial insurance or patient assistance if you have Medicare or no insurance.    Which vaccines are recommended for me? Talk to your doctor about these vaccines: Flu, Coronavirus (COVID-19), Pneumococcal (pneumonia), Tdap, Hepatitis B, Zoster (shingles)               Ubrelvy (Ubrogepant)  Medication Expectations   Why am I taking this medication? You are taking this medication to treat acute migraines.   What should I expect while on this medication? You should expect to see a decrease in the frequency and severity of your migraines.   How does the medication work? Ubrelvy is a monoclonal antibody that binds to calcitonin gene-related peptide (CGRP) and blocks its binding to the receptor decreasing the severity of migraines.   How long will I be on this medication for? The amount of time you will be on this medication will be determined by your doctor and your response to the medication.     How do I take this medication? Take as directed on your prescription label.  Reviewed plan for Ubrelvy 100mg (1 tablet) PO daily prn; may repeat x 1 in 2 hours, if needed. Max dosage = 200mg/24 hours.    What are some possible side effects? Potential side effects including, but not limited to nausea and somnolence. Pt verbalized understanding.   What happens if I miss a dose? This is taken as needed for migraines.     Medication Safety   What are things I should warn my doctor immediately about? Allergic reaction: Itching or hives, swelling in your face or hands, swelling or tingling in your mouth or throat, chest tightness, trouble breathing     What are things that I should be cautious of? Tell your doctor if you are pregnant or breastfeeding, or if you have kidney disease or liver disease.   What are some medications that can interact with this one? Concomitant use of strong CY inhibitors, check with your pharmacist or provider before starting any new medications, avoid grapefruit juice.     Medication Storage/Handling   How should I handle this medication? Keep this medication out of reach of pets/children.   How does this medication need to be stored? Store at a controlled room temperature between 20 and 25 degrees C (68 and 77 degrees F), with excursions permitted between 15 and 30 degrees C (59 and 86 degrees F) away from direct sunlight and moisture.   How should I dispose of this medication? There should not be a need to dispose of this medication unless your provider decides to change the dose or therapy. If that is the case, take to your local police station for proper disposal. Some pharmacies also have take-back bins for medication drop-off.      Resources/Support   How can I remind myself to take this medication? This is taken as needed for migraines.   Is financial support available?  Yes, [employer] can provide co-pay cards if you have commercial insurance or patient assistance if  you have Medicare or no insurance.    Which vaccines are recommended for me? Talk to your doctor about these vaccines: Flu, Coronavirus (COVID-19), Pneumococcal (pneumonia), Tdap, Hepatitis B, Zoster (shingles)          Adherence and Self-Administration  • Barriers to Patient Adherence and/or Self-Administration: none   • Methods for Supporting Patient Adherence and/or Self-Administration: none required     Goals of Therapy  • Patient Goals of Therapy: reduction of migraine days by 50% with limited to no side effects   • Clinical Goals or Therapeutic Targets, If Applicable: same as patient as well as ensuring medications are affordable     Reassessment Plan & Follow-Up  1. Medication Therapy Changes: none  2. Additional Plans, Therapy Recommendations, or Therapy Problems to Be Addressed: none  3. Patient prior to therapy admits to 4-5 migraine days per week.  Patient has tried and failed other therapies noted in neuro MD note.  Patient will start with Ajovy for maintenance and Ubrelvy for prn use.  [facility] will follow up with patient in 3 weeks.   4. Pharmacist to perform regular reassessments no more than (6) months from the previous assessment.  5. Welcome information and patient satisfaction survey to be sent by retail team with patient's initial fill.  6. Care Coordinator to set up future refill outreaches, coordinate prescription delivery, and escalate clinical questions to pharmacist.     Attestation  I attest that the initiated specialty medication(s) are appropriate for the patient based on my assessment.  If the prescribed therapy is at any point deemed not appropriate based on the current or future assessments, a consultation will be initiated with the patient's specialty care provider to determine the best course of action. The revised plan of therapy will be documented along with any additional patient education provided.     Electronically signed by Nadeem Madera RPH, 09/28/22, 1:12 PM EDT.

## 2022-09-29 ENCOUNTER — HOSPITAL ENCOUNTER (OUTPATIENT)
Dept: GENERAL RADIOLOGY | Facility: HOSPITAL | Age: 37
Discharge: HOME | End: 2022-09-29
Admitting: NURSE PRACTITIONER

## 2022-09-29 DIAGNOSIS — R93.89 ABNORMAL CHEST X-RAY: ICD-10-CM

## 2022-09-29 PROCEDURE — 71046 X-RAY EXAM CHEST 2 VIEWS: CPT

## 2022-09-29 PROCEDURE — 71046 X-RAY EXAM CHEST 2 VIEWS: CPT | Performed by: RADIOLOGY

## 2022-09-30 ENCOUNTER — PATIENT MESSAGE (OUTPATIENT)
Dept: NEUROLOGY | Facility: CLINIC | Age: 37
End: 2022-09-30

## 2022-09-30 ENCOUNTER — PATIENT ROUNDING (BHMG ONLY) (OUTPATIENT)
Dept: NEUROLOGY | Facility: CLINIC | Age: 37
End: 2022-09-30

## 2022-10-06 ENCOUNTER — TELEPHONE (OUTPATIENT)
Dept: CARDIOLOGY | Facility: CLINIC | Age: 37
End: 2022-10-06

## 2022-10-06 RX ORDER — ACYCLOVIR 400 MG/1
400 TABLET ORAL 3 TIMES DAILY
Qty: 15 TABLET | Refills: 0 | Status: SHIPPED | OUTPATIENT
Start: 2022-10-06 | End: 2022-10-11

## 2022-10-06 NOTE — TELEPHONE ENCOUNTER
Patient has fever blisters, recurrent and using topicals. Requesting rx for oral medications. Rx acyclovir sent to pharmacy.

## 2022-11-04 ENCOUNTER — OFFICE VISIT (OUTPATIENT)
Dept: PSYCHIATRY | Facility: CLINIC | Age: 37
End: 2022-11-04

## 2022-11-04 VITALS — HEART RATE: 71 BPM | SYSTOLIC BLOOD PRESSURE: 116 MMHG | OXYGEN SATURATION: 100 % | DIASTOLIC BLOOD PRESSURE: 68 MMHG

## 2022-11-04 DIAGNOSIS — F41.9 ANXIETY DISORDER, UNSPECIFIED TYPE: ICD-10-CM

## 2022-11-04 DIAGNOSIS — F39 UNSPECIFIED MOOD (AFFECTIVE) DISORDER: ICD-10-CM

## 2022-11-04 PROCEDURE — 99213 OFFICE O/P EST LOW 20 MIN: CPT | Performed by: NURSE PRACTITIONER

## 2022-11-04 RX ORDER — ONDANSETRON 4 MG/1
4 TABLET, ORALLY DISINTEGRATING ORAL DAILY PRN
Qty: 10 TABLET | Refills: 0 | Status: SHIPPED | OUTPATIENT
Start: 2022-11-04

## 2022-11-04 RX ORDER — VORTIOXETINE 5 MG/1
5 TABLET, FILM COATED ORAL
Qty: 30 TABLET | Refills: 0 | COMMUNITY
Start: 2022-11-04 | End: 2023-01-13

## 2022-11-04 NOTE — PROGRESS NOTES
Subjective   Diallo Spence is a 37 y.o. female is here today for medication management follow-up.    Chief Complaint:  ADHD anxiety     History of Present Illness:   Patient presents today for a follow up for medication management for ADHD and anxiety. Patient states dealing with a lot of stress with court date coming up on the 15th. Patient states really anxious and stressed over having to have witnesses. Patient states still taking medication some, but not had prozac in a few days. Patient states very anxious lately with everything. Patient states feel like mind is running through everything. Patient states sleeping somewhat and getting about 5-7 hours a night. Denies any nightmares. Patient denies any panic attacks. Patient states anxiety is at a 10 on a 0-10 scale with 10 being the worst. Patient states always feeling anxious lately. Patient states depression is at a 6 on a 0-10 scale with 10 being the worst. Patient states unable to concentrate or focus on anything and struggling at work. Denies any thoughts to harm self or others. Denies any auditory or visual hallucinations. Patient states don't remember when had prozac last due to so much going on. Patient states with anxiety and stress get sick to stomach. Patient states appetite is decreased some due to feeling sick.   The following portions of the patient's history were reviewed and updated as appropriate: allergies, current medications, past family history, past medical history, past social history, past surgical history and problem list.    Review of Systems   Constitutional: Negative.    Respiratory: Negative.    Cardiovascular: Negative.    Gastrointestinal: Negative.    Neurological: Negative.    Psychiatric/Behavioral: Positive for decreased concentration, dysphoric mood and sleep disturbance. The patient is nervous/anxious.        Objective   Physical Exam  Vitals reviewed.   Constitutional:       Appearance: Normal appearance. She is  well-developed and well-groomed.   Neurological:      Mental Status: She is alert.   Psychiatric:         Attention and Perception: Attention and perception normal.         Mood and Affect: Affect normal. Mood is anxious.         Speech: Speech normal.         Behavior: Behavior normal. Behavior is cooperative.         Thought Content: Thought content normal.         Cognition and Memory: Cognition and memory normal.         Judgment: Judgment normal.       Blood pressure 116/68, pulse 71, SpO2 100 %. There is no height or weight on file to calculate BMI.    Allergies   Allergen Reactions   • Latex        Medication List:   Current Outpatient Medications   Medication Sig Dispense Refill   • Cholecalciferol (Vitamin D3) 25 MCG (1000 UT) capsule Take 1 capsule by mouth Daily. 60 capsule 0   • Fremanezumab-vfrm (Ajovy) 225 MG/1.5ML solution auto-injector Inject 225 mg under the skin into the appropriate area as directed Every 30 (Thirty) Days. 1.5 mL 11   • ondansetron ODT (ZOFRAN-ODT) 4 MG disintegrating tablet Place 1 tablet on the tongue Daily As Needed for Nausea or Vomiting. 10 tablet 0   • pantoprazole (Protonix) 40 MG EC tablet Take 1 tablet by mouth Daily. 90 tablet 1   • Riboflavin 400 MG tablet Take 400 mg by mouth Daily With Breakfast. 30 tablet 3   • SUMAtriptan (IMITREX) 100 MG tablet Take 1 tablet by mouth at the onset of headache. Repeat another dose in 3 hours if no relief. No more than 2 tablets in 1 day and no more than twice in a week. 9 tablet 3   • Vortioxetine HBr (Trintellix) 5 MG tablet tablet Take 1 tablet by mouth Daily With Breakfast. 30 tablet 0     No current facility-administered medications for this visit.       Mental Status Exam:   Hygiene:   good  Cooperation:  Cooperative  Eye Contact:  Good  Psychomotor Behavior:  Appropriate  Affect:  Appropriate  Hopelessness: Denies  Speech:  Normal  Thought Process:  Goal directed and Linear  Thought Content:  Normal  Suicidal:  None  Homicidal:   None  Hallucinations:  None  Delusion:  None  Memory:  Intact  Orientation:  Person, Place, Time and Situation  Reliability:  fair  Insight:  Fair  Judgement:  Fair  Impulse Control:  Fair  Physical/Medical Issues:  No               Assessment & Plan   Diagnoses and all orders for this visit:    1. Anxiety disorder, unspecified type  -     Vortioxetine HBr (Trintellix) 5 MG tablet tablet; Take 1 tablet by mouth Daily With Breakfast.  Dispense: 30 tablet; Refill: 0  -     ondansetron ODT (ZOFRAN-ODT) 4 MG disintegrating tablet; Place 1 tablet on the tongue Daily As Needed for Nausea or Vomiting.  Dispense: 10 tablet; Refill: 0    2. Unspecified mood (affective) disorder (HCC)  -     Vortioxetine HBr (Trintellix) 5 MG tablet tablet; Take 1 tablet by mouth Daily With Breakfast.  Dispense: 30 tablet; Refill: 0  -     ondansetron ODT (ZOFRAN-ODT) 4 MG disintegrating tablet; Place 1 tablet on the tongue Daily As Needed for Nausea or Vomiting.  Dispense: 10 tablet; Refill: 0            Discussed medication options with patient. Discont. Prozac. Start Trintellix 5mg daily for worsening depression and anxiety. Reviewed the risks, benefits, and side effects of the medications; patient acknowledged and verbally consented. Patient is agreeable to call the office with any questions, concerns, or worsening of symptoms. Patient is aware to call 911 or go to the nearest ER should begin having SI/HI.          Follow up in four weeks          Errors in dictation may reflect use of voice recognition software and not all errors in transcription may have been detected prior to signing.              This document has been electronically signed by JAX Avalos   November 15, 2022 12:02 EST  Answers for HPI/ROS submitted by the patient on 9/23/2022  Please describe your symptoms.: Depression  Have you had these symptoms before?: Yes  How long have you been having these symptoms?: Greater than 2 weeks  What is the primary reason for  your visit?: Other

## 2022-11-09 ENCOUNTER — OFFICE VISIT (OUTPATIENT)
Dept: NEUROLOGY | Facility: CLINIC | Age: 37
End: 2022-11-09

## 2022-11-09 VITALS
OXYGEN SATURATION: 100 % | HEIGHT: 66 IN | DIASTOLIC BLOOD PRESSURE: 70 MMHG | WEIGHT: 162.92 LBS | SYSTOLIC BLOOD PRESSURE: 110 MMHG | HEART RATE: 84 BPM | BODY MASS INDEX: 26.18 KG/M2

## 2022-11-09 DIAGNOSIS — G43.119 INTRACTABLE MIGRAINE WITH AURA WITHOUT STATUS MIGRAINOSUS: Primary | ICD-10-CM

## 2022-11-09 PROCEDURE — 99214 OFFICE O/P EST MOD 30 MIN: CPT | Performed by: PSYCHIATRY & NEUROLOGY

## 2022-11-09 RX ORDER — SUMATRIPTAN 100 MG/1
TABLET, FILM COATED ORAL
Qty: 9 TABLET | Refills: 3 | Status: SHIPPED | OUTPATIENT
Start: 2022-11-09 | End: 2023-01-13 | Stop reason: ALTCHOICE

## 2022-11-09 NOTE — PROGRESS NOTES
Subjective:    CC: Diallo Spence is in clinic today for follow up for history of migraines and an episode of complicated migraine.    HPI:  Initial visit: 9/28/2022: Patient is a 37-year-old female with past medical history of migraines referred to clinic to establish care for migraines.  She reports that she has had migraines for several years now.  She reports on an average 4-5 migraine days in a week.  She reports her typical migraine starts in the right temporal region associated with sharp pain and describes pain intensity is 10 out of 10.  She also reports associated light sensitivity, nausea but no vomiting.  She reports her typical triggers are stress, certain strong smells.  Back in July 2022, while she was at work, she is experienced typical migraine however she started experiencing associated difficulty with speech, right hemibody paresthesias and became significantly weak on her right side for which 911 had to be called and she then was brought to [facility] ED for further evaluation.  Code stroke was called and she was taken for CT perfusion, CT angiogram brain and neck.  All initial work-up was negative and she was considered a candidate for IV TNKase.  She was then admitted to ICU for observation.  Post TNKase CT was negative.  She continued to remain weak on her right side so she was eventually discharged home with outpatient physical therapy.  She reports that with physical therapy she has had about 80% improvement in right-sided weakness.  She is still having frequent migraines though.  She reports that she was started on Topamax and verapamil soon after discharge but both the medications made her really drowsy, sleepy and tired so she has stopped taking it.  She is also taking Imitrex 50 mg as needed as an abortive treatment but it does not really work well.  She usually has to take 2 doses of Imitrex followed by Excedrin or ibuprofen.  There is no family history of  migraines.    Follow-up: 11/9/2022: She is in clinic for regular follow-up.  Since her initial visit in September 2022, she has not had any further episodes of complicated or complex migraine.  She reports that she is now doing Ajovy once a month injection and has completed 2 Ajovy injections.  She reports that migraine intensity and frequency seems to be better.  She did try Ubrelvy as needed as an abortive treatment but reports that it sometimes works and sometimes does not work.  Imitrex works better.  Currently she is on Imitrex 50 mg dose.    The following portions of the patient's history were reviewed and updated as of 11/09/2022: allergies, social history and problem list.       Current Outpatient Medications:   •  Cholecalciferol (Vitamin D3) 25 MCG (1000 UT) capsule, Take 1 capsule by mouth Daily., Disp: 60 capsule, Rfl: 0  •  Fremanezumab-vfrm (Ajovy) 225 MG/1.5ML solution auto-injector, Inject 225 mg under the skin into the appropriate area as directed Every 30 (Thirty) Days., Disp: 1.5 mL, Rfl: 11  •  ondansetron ODT (ZOFRAN-ODT) 4 MG disintegrating tablet, Place 1 tablet on the tongue Daily As Needed for Nausea or Vomiting., Disp: 10 tablet, Rfl: 0  •  pantoprazole (Protonix) 40 MG EC tablet, Take 1 tablet by mouth Daily., Disp: 90 tablet, Rfl: 1  •  Riboflavin 400 MG tablet, Take 400 mg by mouth Daily With Breakfast., Disp: 30 tablet, Rfl: 3  •  Vortioxetine HBr (Trintellix) 5 MG tablet tablet, Take 1 tablet by mouth Daily With Breakfast., Disp: 30 tablet, Rfl: 0  •  SUMAtriptan (IMITREX) 100 MG tablet, 1 tablet at the onset of headache. Repeat another dose in 3hours if no relief.No more than 2tablets in 1day and no more than twice in a week, Disp: 9 tablet, Rfl: 3   Past Medical History:   Diagnosis Date   • ADHD (attention deficit hyperactivity disorder)    • Anxiety    • Colitis 07/2020    11/3/2020 had colonoscopy and recommend repeat in 5 years   • Depression    • Fatty liver    • GERD  "(gastroesophageal reflux disease)    • Hiatal hernia    • Liver hemangioma 06/15/2022   • Liver hemangioma    • Liver problem    • Migraine headache    • Panic disorder    • Peripheral neuropathy    • Psychiatric illness    • Self-injurious behavior       Past Surgical History:   Procedure Laterality Date   •  SECTION     • CHOLECYSTECTOMY     • COLONOSCOPY      / BEBETO Reynolds- Normal    • COLONOSCOPY N/A 2016    Procedure: COLONOSCOPY CPT CODE: 53437;  Surgeon: Sherry Muse DO;  Location:  [facility] OR;  Service:    • ENDOSCOPY N/A 2016    Procedure: ESOPHAGOGASTRODUODENOSCOPY WITH BIOPSY CPT CODE: 44894;  Surgeon: Sherry Muse DO;  Location:  [facility] OR;  Service:    • GALLBLADDER SURGERY     • NERVE SURGERY      Right arm    • SHOULDER SURGERY     • TONSILLECTOMY     • UPPER GASTROINTESTINAL ENDOSCOPY      Dr. Jteer/ Small hiatal hernia       Family History   Problem Relation Age of Onset   • No Known Problems Mother    • Diabetes Father    • Atrial fibrillation Father    • Hypertension Father    • Hyperlipidemia Father    • Drug abuse Brother    • Anxiety disorder Brother    • Alcohol abuse Brother    • Drug abuse Brother    • Depression Brother    • Heart disease Maternal Grandmother    • Bone cancer Paternal Grandfather    • Diabetes Paternal Grandfather    • Dementia Paternal Grandmother    • Heart disease Paternal Grandmother    • Diabetes Other    • Heart disease Other    • Hypertension Other         Review of Systems  Objective:    /70   Pulse 84   Ht 167.6 cm (65.98\")   Wt 73.9 kg (162 lb 14.7 oz)   SpO2 100%   BMI 26.31 kg/m²     Neurology Exam:  General apperance: NAD.     Mental status: Alert, awake and oriented to time place and person.    Recent and Remote memory: Can recall 3/3 objects at 5 minutes. Can recall historical events.     Attention span and Concentration: Serial 7s: Normal.     Fund of knowledge:  Normal.     Language and Speech: No " aphasia or dysarthria.    Naming , Repitition and Comprehension:  Can name objects, repeat a sentence and follow commands. Speech is clear and fluent with good repetition, comprehension, and naming.    CN II to XII: Intact.    Opthalmoscopic Exam: No papilledema.    Motor:  Right UE muscle strength 5-/5. Normal tone.     Left UE muscle strength 5/5. Normal tone.      Right LE muscle strength 5-/5. Normal tone.     Left LE muscle strength 5/5. Normal tone.      Sensory: Normal light touch, vibration and pinprick sensation bilaterally.    DTRs: 2+ bilaterally.    Babinski: Negative bilaterally.    Co-ordination: Normal finger-to-nose, heel to shin B/L.    Rhomberg: Negative.    Gait: Normal.    Cardiovascular: Regular rate and rhythm without murmur, gallop or rub.    Assessment and Plan:  1. Intractable migraine with aura without status migrainosus  -She has not had any more episodes of complex or complicated migraine.  Overall migraine frequency is reduced by 30 to 40% with monthly Ajovy use.  I am expecting even better results with continued use of Ajovy.  Since Imitrex is working better, it will be increased to 100 mg dose.  I have advised her to take Imitrex first and then alternate it with Ubrelvy and see if it works better that way for migraine abortive treatment.  Right-sided strength is improving.  Continue with exercises as per schedule and I will see her back in clinic in 3 months for follow-up.       I spent 30 minutes in patient care: Reviewing records prior to the visit, entering orders and documentation and spent more than lobo 50% of this time face-to-face in management, instructions and education regarding above mentioned diagnosis and also on counseling and discussing about taking medication regularly, possible side effects with medication use, importance of good sleep hygiene, good hydration and regular exercise.    Return in about 3 months (around 2/9/2023).

## 2022-12-20 ENCOUNTER — SPECIALTY PHARMACY (OUTPATIENT)
Dept: PHARMACY | Facility: TELEHEALTH | Age: 37
End: 2022-12-20

## 2023-01-13 ENCOUNTER — OFFICE VISIT (OUTPATIENT)
Dept: CARDIOLOGY | Facility: CLINIC | Age: 38
End: 2023-01-13
Payer: COMMERCIAL

## 2023-01-13 VITALS
HEART RATE: 84 BPM | SYSTOLIC BLOOD PRESSURE: 116 MMHG | DIASTOLIC BLOOD PRESSURE: 86 MMHG | WEIGHT: 170 LBS | BODY MASS INDEX: 27.32 KG/M2 | HEIGHT: 66 IN | OXYGEN SATURATION: 99 %

## 2023-01-13 DIAGNOSIS — F41.9 ANXIETY: ICD-10-CM

## 2023-01-13 DIAGNOSIS — F32.A DEPRESSION, UNSPECIFIED DEPRESSION TYPE: ICD-10-CM

## 2023-01-13 DIAGNOSIS — E55.9 VITAMIN D DEFICIENCY: ICD-10-CM

## 2023-01-13 DIAGNOSIS — R00.2 PALPITATIONS: Primary | ICD-10-CM

## 2023-01-13 DIAGNOSIS — K21.9 GASTROESOPHAGEAL REFLUX DISEASE WITHOUT ESOPHAGITIS: ICD-10-CM

## 2023-01-13 DIAGNOSIS — F44.4 FUNCTIONAL NEUROLOGICAL SYMPTOM DISORDER WITH WEAKNESS OR PARALYSIS: ICD-10-CM

## 2023-01-13 DIAGNOSIS — G43.401 HEMIPLEGIC MIGRAINE WITH STATUS MIGRAINOSUS, NOT INTRACTABLE: ICD-10-CM

## 2023-01-13 PROCEDURE — 99213 OFFICE O/P EST LOW 20 MIN: CPT | Performed by: NURSE PRACTITIONER

## 2023-01-13 RX ORDER — SUMATRIPTAN 100 MG/1
TABLET, FILM COATED ORAL
Qty: 9 TABLET | Refills: 3 | Status: SHIPPED | OUTPATIENT
Start: 2023-01-13

## 2023-01-13 RX ORDER — PANTOPRAZOLE SODIUM 40 MG/1
40 TABLET, DELAYED RELEASE ORAL DAILY
Qty: 90 TABLET | Refills: 1 | Status: SHIPPED | OUTPATIENT
Start: 2023-01-13

## 2023-01-14 NOTE — PROGRESS NOTES
Subjective     Diallo Spence is a 37 y.o. female.   Chief Complaint   Patient presents with   • Palpitations     Follow up   • Migraine     Follow up   • Med Refill     pending      History of Present Illness   Wayne Spence is a 37 y.o. female who presents to the clinic today for a routine follow up.     Palpitations not currently on beta blockers. She has previously been on beta blockers but was discontinued when she was placed on other medications for headache prevention. She reports caffeine intake. She denies hx of anemia or thyroid disease. She has noted some intermittent symptoms however doesn't want to restart therapy.     Hemiplegic migraine stable on current medications. She feels her headaches are stable and improved. She is having some injection site reactions with her medications and the speciality pharmacy is aware. She reports they are considering medication changes. She has R sided weakness which has mostly resolved. She quit formal physical therapy and had been doing home exercise. She has now quit home exercises. She continues to have problems with foot drop on the R side.     Anxiety/depression currently off medication. She was on Prozac but that was changed to Trintellix and couldn't tolerate. She continues to follow with behavioral health and hopes to restart Prozac.     Vitamin D hasn't been taking her supplement recently. She has GERD and stable on protonix.     Patient Active Problem List   Diagnosis   • Anxiety   • Depression   • Palpitations   • Gastroesophageal reflux disease without esophagitis   • Liver hemangioma   • Fatty liver   • Hiatal hernia   • Colon polyp   • ADHD   • Migraine headache   • Acute right-sided weakness   • 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     Past Medical History:   Diagnosis Date   • ADHD (attention deficit hyperactivity disorder)    • Anxiety    • Colitis 07/2020    11/3/2020 had colonoscopy and recommend repeat in 5  years   • Depression    • Fatty liver    • GERD (gastroesophageal reflux disease)    • Hiatal hernia    • Liver hemangioma 06/15/2022   • Liver hemangioma    • Liver problem    • Migraine headache    • Panic disorder    • Peripheral neuropathy    • Psychiatric illness    • Self-injurious behavior      Past Surgical History:   Procedure Laterality Date   •  SECTION     • CHOLECYSTECTOMY     • COLONOSCOPY      / BEBETO Reynolds- Normal    • COLONOSCOPY N/A 2016    Procedure: COLONOSCOPY CPT CODE: 10471;  Surgeon: Sherry Muse DO;  Location:  [facility];  Service:    • ENDOSCOPY N/A 2016    Procedure: ESOPHAGOGASTRODUODENOSCOPY WITH BIOPSY CPT CODE: 88764;  Surgeon: Sherry Muse DO;  Location:  [facility];  Service:    • GALLBLADDER SURGERY     • NERVE SURGERY      Right arm    • SHOULDER SURGERY     • TONSILLECTOMY     • UPPER GASTROINTESTINAL ENDOSCOPY      Dr. Jeter/ Small hiatal hernia        Family History   Problem Relation Age of Onset   • No Known Problems Mother    • Diabetes Father    • Atrial fibrillation Father    • Hypertension Father    • Hyperlipidemia Father    • Drug abuse Brother    • Anxiety disorder Brother    • Alcohol abuse Brother    • Drug abuse Brother    • Depression Brother    • Heart disease Maternal Grandmother    • Bone cancer Paternal Grandfather    • Diabetes Paternal Grandfather    • Dementia Paternal Grandmother    • Heart disease Paternal Grandmother    • Diabetes Other    • Heart disease Other    • Hypertension Other      Social History     Tobacco Use   • Smoking status: Never   • Smokeless tobacco: Never   Vaping Use   • Vaping Use: Never used   Substance Use Topics   • Alcohol use: Yes     Comment: very limited socially   • Drug use: No     The following portions of the patient's history were reviewed and updated as appropriate: allergies, current medications, past family history, past medical history, past social history, past surgical  history and problem list.    Allergies   Allergen Reactions   • Latex        Current Outpatient Medications:   •  Cholecalciferol (Vitamin D3) 25 MCG (1000 UT) capsule, Take 1 capsule by mouth Daily., Disp: 60 capsule, Rfl: 0  •  Fremanezumab-vfrm (Ajovy) 225 MG/1.5ML solution auto-injector, Inject 225 mg under the skin into the appropriate area as directed Every 30 (Thirty) Days., Disp: 1.5 mL, Rfl: 11  •  ondansetron ODT (ZOFRAN-ODT) 4 MG disintegrating tablet, Place 1 tablet on the tongue Daily As Needed for Nausea or Vomiting., Disp: 10 tablet, Rfl: 0  •  pantoprazole (Protonix) 40 MG EC tablet, Take 1 tablet by mouth Daily., Disp: 90 tablet, Rfl: 1  •  Riboflavin 400 MG tablet, Take 400 mg by mouth Daily With Breakfast., Disp: 30 tablet, Rfl: 3  •  ubrogepant 100 MG tablet, Take 1 tablet by mouth As Needed (Migraine) for up to 30 days., Disp: 16 tablet, Rfl: 3    Review of Systems   Constitutional: Negative for activity change, appetite change, chills, diaphoresis, fatigue and fever.   HENT: Negative for congestion, drooling, ear discharge, ear pain, mouth sores, nosebleeds, postnasal drip, rhinorrhea, sinus pressure, sneezing and sore throat.    Eyes: Negative for pain, discharge and visual disturbance.   Respiratory: Negative for cough, chest tightness, shortness of breath and wheezing.    Cardiovascular: Positive for palpitations. Negative for chest pain and leg swelling.   Gastrointestinal: Negative for abdominal pain, constipation, diarrhea, nausea and vomiting.   Endocrine: Negative for cold intolerance, heat intolerance, polydipsia, polyphagia and polyuria.   Musculoskeletal: Negative for arthralgias, myalgias and neck pain.   Skin: Negative for rash and wound.   Neurological: Negative for dizziness, syncope, speech difficulty, weakness, light-headedness and headaches.   Hematological: Negative for adenopathy. Does not bruise/bleed easily.   Psychiatric/Behavioral: Negative for confusion, dysphoric mood  "and sleep disturbance. The patient is not nervous/anxious.    All other systems reviewed and are negative.      /86 (BP Location: Left arm, Patient Position: Sitting, Cuff Size: Adult)   Pulse 84   Ht 167.6 cm (66\")   Wt 77.1 kg (170 lb)   SpO2 99%   BMI 27.44 kg/m²     Objective   Allergies   Allergen Reactions   • Latex        Physical Exam  Vitals reviewed.   Constitutional:       Appearance: Normal appearance. She is well-developed.   HENT:      Head: Normocephalic.   Eyes:      Conjunctiva/sclera: Conjunctivae normal.   Neck:      Vascular: No JVD.   Cardiovascular:      Rate and Rhythm: Normal rate and regular rhythm.   Pulmonary:      Effort: Pulmonary effort is normal.      Breath sounds: Normal breath sounds.   Musculoskeletal:      Cervical back: Neck supple.      Right lower leg: No edema.      Left lower leg: No edema.   Skin:     General: Skin is warm and dry.   Neurological:      Mental Status: She is alert and oriented to person, place, and time.   Psychiatric:         Attention and Perception: Attention normal.         Mood and Affect: Mood normal.         Speech: Speech normal.         Behavior: Behavior normal. Behavior is cooperative.         Cognition and Memory: Cognition normal.           LABS  WBC   Date Value Ref Range Status   07/01/2022 8.74 3.40 - 10.80 10*3/mm3 Final     RBC   Date Value Ref Range Status   07/01/2022 4.31 3.77 - 5.28 10*6/mm3 Final     Hemoglobin   Date Value Ref Range Status   07/01/2022 13.1 12.0 - 15.9 g/dL Final     Hematocrit   Date Value Ref Range Status   07/01/2022 39.4 34.0 - 46.6 % Final     MCV   Date Value Ref Range Status   07/01/2022 91.4 79.0 - 97.0 fL Final     MCH   Date Value Ref Range Status   07/01/2022 30.4 26.6 - 33.0 pg Final     MCHC   Date Value Ref Range Status   07/01/2022 33.2 31.5 - 35.7 g/dL Final     RDW   Date Value Ref Range Status   07/01/2022 13.3 12.3 - 15.4 % Final     RDW-SD   Date Value Ref Range Status   07/01/2022 45.3 " 37.0 - 54.0 fl Final     MPV   Date Value Ref Range Status   07/01/2022 9.4 6.0 - 12.0 fL Final     Platelets   Date Value Ref Range Status   07/01/2022 311 140 - 450 10*3/mm3 Final     Neutrophil %   Date Value Ref Range Status   06/30/2022 75.6 42.7 - 76.0 % Final     Lymphocyte %   Date Value Ref Range Status   06/30/2022 18.2 (L) 19.6 - 45.3 % Final     Monocyte %   Date Value Ref Range Status   06/30/2022 4.9 (L) 5.0 - 12.0 % Final     Eosinophil %   Date Value Ref Range Status   06/30/2022 0.4 0.3 - 6.2 % Final     Basophil %   Date Value Ref Range Status   06/30/2022 0.6 0.0 - 1.5 % Final     Immature Grans %   Date Value Ref Range Status   06/30/2022 0.3 0.0 - 0.5 % Final     Neutrophils, Absolute   Date Value Ref Range Status   06/30/2022 7.97 (H) 1.70 - 7.00 10*3/mm3 Final     Lymphocytes, Absolute   Date Value Ref Range Status   06/30/2022 1.92 0.70 - 3.10 10*3/mm3 Final     Monocytes, Absolute   Date Value Ref Range Status   06/30/2022 0.52 0.10 - 0.90 10*3/mm3 Final     Eosinophils, Absolute   Date Value Ref Range Status   06/30/2022 0.04 0.00 - 0.40 10*3/mm3 Final     Basophils, Absolute   Date Value Ref Range Status   06/30/2022 0.06 0.00 - 0.20 10*3/mm3 Final     Immature Grans, Absolute   Date Value Ref Range Status   06/30/2022 0.03 0.00 - 0.05 10*3/mm3 Final     nRBC   Date Value Ref Range Status   06/30/2022 0.0 0.0 - 0.2 /100 WBC Final         Total Cholesterol   Date Value Ref Range Status   07/01/2022 181 0 - 200 mg/dL Final     Triglycerides   Date Value Ref Range Status   07/01/2022 72 0 - 150 mg/dL Final     HDL Cholesterol   Date Value Ref Range Status   07/01/2022 53 40 - 60 mg/dL Final     LDL Cholesterol    Date Value Ref Range Status   07/01/2022 115 (H) 0 - 100 mg/dL Final     IMAGING  XR Chest 2 View    Result Date: 9/29/2022  No radiographic evidence of acute cardiac or pulmonary disease.  This report was finalized on 9/29/2022 8:18 AM by Dr. Kapil Mota MD.               Assessment & Plan   Diagnoses and all orders for this visit:    1. Palpitations (Primary)  Continue to monitor, discussed reinitiation of beta blocker, she defers currently. Recommend avoidance of caffeine/stimulants.     2. Hemiplegic migraine with status migrainosus, not intractable  Continue on medications as managed by Neurology    3. Gastroesophageal reflux disease without esophagitis  Stable, refill medications     4. Anxiety  Stable, continue to follow with behavioral health    5. Depression, unspecified depression type  Stable, continue to follow with behavioral health    6. 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  Discussed imaging and further workup with nerve conduction, she defers. Recommend physical therapy     7. Vitamin D deficiency  Recommend daily supplementation    Recommend preventative health maintenance and GYN exam  Follow up in 4 months, sooner if needed

## 2023-01-27 ENCOUNTER — SPECIALTY PHARMACY (OUTPATIENT)
Dept: PHARMACY | Facility: TELEHEALTH | Age: 38
End: 2023-01-27
Payer: COMMERCIAL

## 2023-02-24 ENCOUNTER — OFFICE VISIT (OUTPATIENT)
Dept: NEUROLOGY | Facility: CLINIC | Age: 38
End: 2023-02-24
Payer: COMMERCIAL

## 2023-02-24 VITALS
OXYGEN SATURATION: 99 % | WEIGHT: 170 LBS | DIASTOLIC BLOOD PRESSURE: 74 MMHG | HEIGHT: 66 IN | SYSTOLIC BLOOD PRESSURE: 118 MMHG | HEART RATE: 112 BPM | BODY MASS INDEX: 27.32 KG/M2

## 2023-02-24 DIAGNOSIS — G43.109 COMPLICATED MIGRAINE: ICD-10-CM

## 2023-02-24 DIAGNOSIS — G43.119 INTRACTABLE MIGRAINE WITH AURA WITHOUT STATUS MIGRAINOSUS: Primary | ICD-10-CM

## 2023-02-24 PROCEDURE — 99214 OFFICE O/P EST MOD 30 MIN: CPT | Performed by: PSYCHIATRY & NEUROLOGY

## 2023-02-24 RX ORDER — POTASSIUM CHLORIDE 20 MEQ/1
20 TABLET, EXTENDED RELEASE ORAL DAILY
COMMUNITY
Start: 2023-02-24 | End: 2023-03-07

## 2023-02-24 RX ORDER — FLUOXETINE HYDROCHLORIDE 40 MG/1
40 CAPSULE ORAL DAILY
COMMUNITY

## 2023-02-24 RX ORDER — ASPIRIN 81 MG/1
81 TABLET ORAL DAILY
COMMUNITY
Start: 2023-02-24 | End: 2023-03-27

## 2023-02-24 RX ORDER — ERENUMAB-AOOE 140 MG/ML
140 INJECTION, SOLUTION SUBCUTANEOUS
Qty: 1.12 ML | Refills: 11 | Status: SHIPPED | OUTPATIENT
Start: 2023-02-24

## 2023-02-24 NOTE — PROGRESS NOTES
Subjective:    CC: Diallo EricksonEddieManasa is in clinic today for follow up for history of complicated migraines.    HPI:  Initial visit: 9/28/2022: Patient is a 37-year-old female with past medical history of migraines referred to clinic to establish care for migraines.  She reports that she has had migraines for several years now.  She reports on an average 4-5 migraine days in a week.  She reports her typical migraine starts in the right temporal region associated with sharp pain and describes pain intensity is 10 out of 10.  She also reports associated light sensitivity, nausea but no vomiting.  She reports her typical triggers are stress, certain strong smells.  Back in July 2022, while she was at work, she is experienced typical migraine however she started experiencing associated difficulty with speech, right hemibody paresthesias and became significantly weak on her right side for which 911 had to be called and she then was brought to [facility] ED for further evaluation.  Code stroke was called and she was taken for CT perfusion, CT angiogram brain and neck.  All initial work-up was negative and she was considered a candidate for IV TNKase.  She was then admitted to ICU for observation.  Post TNKase CT was negative.  She continued to remain weak on her right side so she was eventually discharged home with outpatient physical therapy.  She reports that with physical therapy she has had about 80% improvement in right-sided weakness.  She is still having frequent migraines though.  She reports that she was started on Topamax and verapamil soon after discharge but both the medications made her really drowsy, sleepy and tired so she has stopped taking it.  She is also taking Imitrex 50 mg as needed as an abortive treatment but it does not really work well.  She usually has to take 2 doses of Imitrex followed by Excedrin or ibuprofen.  There is no family history of migraines.    Follow-up: 11/9/2022: She is in  clinic for regular follow-up.  Since her initial visit in September 2022, she has not had any further episodes of complicated or complex migraine.  She reports that she is now doing Ajovy once a month injection and has completed 2 Ajovy injections.  She reports that migraine intensity and frequency seems to be better.  She did try Ubrelvy as needed as an abortive treatment but reports that it sometimes works and sometimes does not work.  Imitrex works better.  Currently she is on Imitrex 50 mg dose.    Follow-up: 2/24/2023: She is in clinic for regular follow-up.  Since her last visit in November 2022, she reported that she had an intense migraine 2 days ago causing right-sided weakness where she had to go to ED.  She underwent detailed stroke work-up including CT angiogram brain and neck as well as MRI brain.  I reviewed the results of this testing personally.  It did not reveal any acute intracranial abnormalities.  She reports that symptoms started with intense migraine and then she started having right-sided weakness.  She reports that she did take ibuprofen and Tylenol while she was at work which helped only temporarily and after reaching home, she took a dose of Ubrelvy but symptoms continue to progress.  She reports that she received IV cocktail while in ED and it helped resolve migraine almost completely.  However right-sided weakness has persisted.  She reports primarily weakness involving her right leg.  Her right arm weakness has resolved.  She is also wearing AFO on the right to help with walking.  She had to stop taking Ajovy back in December because of severe local injection site reaction.  Her last Ajovy injection was 2 months ago.  It was helping in keeping migraines under good control.      The following portions of the patient's history were reviewed and updated as of 02/24/2023: allergies, social history and problem list.       Current Outpatient Medications:   •  aspirin 81 MG EC tablet, Take 81  mg by mouth Daily., Disp: , Rfl:   •  Cholecalciferol (Vitamin D3) 25 MCG (1000 UT) capsule, Take 1 capsule by mouth Daily., Disp: 60 capsule, Rfl: 0  •  FLUoxetine (PROzac) 40 MG capsule, Take 40 mg by mouth Daily., Disp: , Rfl:   •  ondansetron ODT (ZOFRAN-ODT) 4 MG disintegrating tablet, Place 1 tablet on the tongue Daily As Needed for Nausea or Vomiting., Disp: 10 tablet, Rfl: 0  •  pantoprazole (Protonix) 40 MG EC tablet, Take 1 tablet by mouth Daily., Disp: 90 tablet, Rfl: 1  •  potassium chloride (K-DUR,KLOR-CON) 20 MEQ CR tablet, Take 20 mEq by mouth Daily., Disp: , Rfl:   •  Riboflavin 400 MG tablet, Take 400 mg by mouth Daily With Breakfast., Disp: 30 tablet, Rfl: 3  •  SUMAtriptan (IMITREX) 100 MG tablet, Take 1 tablet by mouth at the onset of headache. Repeat another dose in 3 hours if no relief. No more than 2 tablets in 1 day and no more than twice in a week., Disp: 9 tablet, Rfl: 3  •  ubrogepant 100 MG tablet, Take 1 tablet by mouth As Needed (Migraine) for up to 30 days., Disp: 16 tablet, Rfl: 3  •  Vortioxetine HBr (TRINTELLIX) 10 MG tablet tablet, Take 10 mg by mouth Daily., Disp: , Rfl:   •  Erenumab-aooe (Aimovig) 140 MG/ML auto-injector, Inject 1 mL under the skin into the appropriate area as directed Every 30 (Thirty) Days., Disp: 1.12 mL, Rfl: 11   Past Medical History:   Diagnosis Date   • ADHD (attention deficit hyperactivity disorder)    • Anxiety    • Colitis 2020    11/3/2020 had colonoscopy and recommend repeat in 5 years   • Depression    • Fatty liver    • GERD (gastroesophageal reflux disease)    • Headache, tension-type    • Hiatal hernia    • Liver hemangioma 06/15/2022   • Liver hemangioma    • Liver problem    • Migraine headache    • Panic disorder    • Peripheral neuropathy    • Psychiatric illness    • Self-injurious behavior       Past Surgical History:   Procedure Laterality Date   •  SECTION     • CHOLECYSTECTOMY     • COLONOSCOPY      / [place]  "[place]- Normal    • COLONOSCOPY N/A 8/30/2016    Procedure: COLONOSCOPY CPT CODE: 49374;  Surgeon: Sherry Muse DO;  Location:  [facility];  Service:    • ENDOSCOPY N/A 8/30/2016    Procedure: ESOPHAGOGASTRODUODENOSCOPY WITH BIOPSY CPT CODE: 49716;  Surgeon: Sherry Muse DO;  Location:  [facility];  Service:    • GALLBLADDER SURGERY     • NERVE SURGERY      Right arm    • SHOULDER SURGERY     • TONSILLECTOMY     • UPPER GASTROINTESTINAL ENDOSCOPY  2010    Dr. Jeter/ Small hiatal hernia       Family History   Problem Relation Age of Onset   • No Known Problems Mother    • Diabetes Father    • Atrial fibrillation Father    • Hypertension Father    • Hyperlipidemia Father    • Drug abuse Brother    • Anxiety disorder Brother    • Alcohol abuse Brother    • Drug abuse Brother    • Depression Brother    • Heart disease Maternal Grandmother    • Bone cancer Paternal Grandfather    • Diabetes Paternal Grandfather    • Dementia Paternal Grandmother    • Heart disease Paternal Grandmother    • Stroke Paternal Grandmother    • Diabetes Other    • Heart disease Other    • Hypertension Other         Review of Systems  Objective:    /74 (BP Location: Right arm, Patient Position: Sitting, Cuff Size: Adult)   Pulse 112   Ht 167.6 cm (65.98\")   Wt 77.1 kg (170 lb)   SpO2 99% Comment: RESTING ROOM AIR  BMI 27.45 kg/m²     Neurology Exam:  General apperance: NAD.     Mental status: Alert, awake and oriented to time place and person.    Recent and Remote memory: Can recall 3/3 objects at 5 minutes. Can recall historical events.     Attention span and Concentration: Serial 7s: Normal.     Fund of knowledge:  Normal.     Language and Speech: No aphasia or dysarthria.    Naming , Repitition and Comprehension:  Can name objects, repeat a sentence and follow commands. Speech is clear and fluent with good repetition, comprehension, and naming.    CN II to XII: Intact.    Opthalmoscopic Exam: No " papilledema.    Motor:  Right UE muscle strength 5/5. Normal tone.     Left UE muscle strength 5/5. Normal tone.      Right LE muscle strength 4/5. Normal tone.  Giveaway weakness noted.    Left LE muscle strength 5/5. Normal tone.      Sensory: Normal light touch, vibration and pinprick sensation bilaterally.    DTRs: 2+ bilaterally.    Babinski: Negative bilaterally.    Co-ordination: Normal finger-to-nose, heel to shin B/L.    Rhomberg: Negative.    Gait: Normal.    Cardiovascular: Regular rate and rhythm without murmur, gallop or rub.    Assessment and Plan:  1. Intractable migraine with aura without status migrainosus  2. Complicated migraine  -Patient with history of complicated migraines.  Unfortunately, she stopped Ajovy 2 months ago because of severe local injection site reaction.  Since stopping Ajovy, she did well until about 2 days ago where she had intense migraine followed by right-sided weakness risk requiring ED visit.  After IV cocktail, migraine has resolved however her right leg weakness has persisted.  She is able to walk unassisted with the help of AFO on the right.  I have assured her that with time, this will resolve completely.  She had good response to Ajovy but she stopped taking it due to severe injection site reaction.  I am going to start her on Aimovig and hopefully Aimovig will not cause such severe reaction like Ajovy.  Continue to use Ubrelvy as needed as an abortive treatment-she will be seen in neurology clinic in 6 weeks for follow-up.        I spent 30 minutes in patient care: Reviewing records prior to the visit, entering orders and documentation and spent more than lobo 50% of this time face-to-face in management, instructions and education regarding above mentioned diagnosis and also on counseling and discussing about taking medication regularly, possible side effects with medication use, importance of good sleep hygiene, good hydration and regular exercise.    Return in about  6 weeks (around 4/7/2023).       Note to patient: The 21st Century Cures Act makes medical notes like these available to patients in the interest of transparency. However, be advised this is a medical document. It is intended as peer to peer communication. It is written in medical language and may contain abbreviations or verbiage that are unfamiliar. It may appear blunt or direct. Medical documents are intended to carry relevant information, facts as evident, and the clinical opinion of the physician.

## 2023-03-03 ENCOUNTER — SPECIALTY PHARMACY (OUTPATIENT)
Dept: PHARMACY | Facility: TELEHEALTH | Age: 38
End: 2023-03-03
Payer: COMMERCIAL

## 2023-03-03 NOTE — PROGRESS NOTES
Specialty Pharmacy Refill Coordination Note     Diallo is a 37 y.o. female contacted today regarding refills of  Ubrelvy specialty medication(s).    Reviewed and verified with patient:  Allergies  Meds       Specialty medication(s) and dose(s) confirmed: yes    Refill Questions    Flowsheet Row Most Recent Value   Changes to allergies? No   Changes to medications? No   New conditions since last clinic visit No   Unplanned office visit, urgent care, ED, or hospital admission in the last 4 weeks  No   How does patient/caregiver feel medication is working? Very good   Financial problems or insurance changes  No   Since the previous refill, were any specialty medication doses or scheduled injections missed or delayed?  No   Does this patient require a clinical escalation to a pharmacist? No                     Follow-up: 23 day(s)     Efe Rick, Pharmacy Technician  Specialty Pharmacy Technician

## 2023-03-06 ENCOUNTER — SPECIALTY PHARMACY (OUTPATIENT)
Dept: PHARMACY | Facility: TELEHEALTH | Age: 38
End: 2023-03-06
Payer: COMMERCIAL

## 2023-03-07 ENCOUNTER — SPECIALTY PHARMACY (OUTPATIENT)
Dept: PHARMACY | Facility: TELEHEALTH | Age: 38
End: 2023-03-07
Payer: COMMERCIAL

## 2023-03-08 ENCOUNTER — SPECIALTY PHARMACY (OUTPATIENT)
Dept: PHARMACY | Facility: TELEHEALTH | Age: 38
End: 2023-03-08
Payer: COMMERCIAL

## 2023-03-08 NOTE — PROGRESS NOTES
Specialty Pharmacy Patient Management Program  Initial Assessment and Reassessment     Diallo Spence is a 38 y.o. female with chronic migraine and enrolled in the Patient Management program offered by [facility].  An initial outreach was conducted, including assessment of therapy appropriateness and specialty medication education for aimovig. The patient is also enrolled with chronic migraine and a follow-up outreach was conducted, including assessment of continued therapy appropriateness, medication adherence, and side effect incidence and management for ajovy.     Insurance Coverage & Financial Support, including any changes  aimovig pa aproved co-pay $5     Relevant Past Medical History and Comorbidities  Relevant medical history and concomitant health conditions were discussed with the patient. The patient's chart has been reviewed for relevant past medical history and comorbid health conditions and updated as necessary.   Past Medical History:   Diagnosis Date   • ADHD (attention deficit hyperactivity disorder)    • Anxiety    • Colitis 07/2020    11/3/2020 had colonoscopy and recommend repeat in 5 years   • Depression    • Fatty liver    • GERD (gastroesophageal reflux disease)    • Headache, tension-type    • Hiatal hernia    • Liver hemangioma 06/15/2022   • Liver hemangioma    • Liver problem    • Migraine headache    • Panic disorder    • Peripheral neuropathy    • Psychiatric illness    • Self-injurious behavior      Social History     Socioeconomic History   • Marital status:      Spouse name: Davon Goel    • Number of children: 1   • Years of education: [employer]    Tobacco Use   • Smoking status: Never   • Smokeless tobacco: Never   Vaping Use   • Vaping Use: Never used   Substance and Sexual Activity   • Alcohol use: Yes     Comment: Occasionally   • Drug use: No   • Sexual activity: Yes       Allergies  Known allergies and reactions were  discussed with the patient. The patient's chart has been reviewed for  allergy information and updated as necessary.   Latex    Current Medication List  This medication list has been reviewed with the patient and evaluated for any interactions or necessary modifications/recommendations, and updated to include all prescription medications, OTC medications, and supplements the patient is currently taking.  This list reflects what is contained in the patient's profile, which has also been marked as reviewed to communicate to other providers it is the most up to date version of the patient's current medication therapy.     Current Outpatient Medications:   •  aspirin 81 MG EC tablet, Take 81 mg by mouth Daily., Disp: , Rfl:   •  Cholecalciferol (Vitamin D3) 25 MCG (1000 UT) capsule, Take 1 capsule by mouth Daily., Disp: 60 capsule, Rfl: 0  •  Erenumab-aooe (Aimovig) 140 MG/ML auto-injector, Inject 1 mL under the skin into the appropriate area as directed Every 30 (Thirty) Days., Disp: 1.12 mL, Rfl: 11  •  Erenumab-aooe (Aimovig) 140 MG/ML auto-injector, Inject 1 ML subcutaneously every 30 days as directed., Disp: 1 mL, Rfl: 11  •  FLUoxetine (PROzac) 40 MG capsule, Take 40 mg by mouth Daily., Disp: , Rfl:   •  ondansetron ODT (ZOFRAN-ODT) 4 MG disintegrating tablet, Place 1 tablet on the tongue Daily As Needed for Nausea or Vomiting., Disp: 10 tablet, Rfl: 0  •  pantoprazole (Protonix) 40 MG EC tablet, Take 1 tablet by mouth Daily., Disp: 90 tablet, Rfl: 1  •  Riboflavin 400 MG tablet, Take 400 mg by mouth Daily With Breakfast., Disp: 30 tablet, Rfl: 3  •  SUMAtriptan (IMITREX) 100 MG tablet, Take 1 tablet by mouth at the onset of headache. Repeat another dose in 3 hours if no relief. No more than 2 tablets in 1 day and no more than twice in a week., Disp: 9 tablet, Rfl: 3  •  ubrogepant (Ubrelvy) 100 MG tablet, Take 1 tablet by mouth As Needed (Migraine) for up to 30 days., Disp: 16 tablet, Rfl: 3  •  Vortioxetine HBr  (TRINTELLIX) 10 MG tablet tablet, Take 10 mg by mouth Daily., Disp: , Rfl:     Drug Interactions  none     Relevant Laboratory Values  No results for input(s): CMP, BMP, CBC in the last 72 hours.    Initial Education Provided for new Specialty Medication  The patient has been provided with the following education and any applicable administration techniques (i.e. self-injection) have been demonstrated for the therapies indicated. All questions and concerns have been addressed prior to the patient receiving the medication, and the patient has verbalized understanding of the education and any materials provided.  Additional patient education shall be provided and documented upon request by the patient, provider or payer.                Aimovig (Erenumab-aoee)           Medication Expectations   Why am I taking this medication? You are taking this medication for migraine prophylaxis.   What should I expect while on this medication? You should expect to a decrease in the frequency and severity of your migraines.   How does the medication work? Aimovig is a monoclonal antibody that binds to calcitonin gene-related peptide (CGRP) and blocks its binding to the receptor decreasing the severity of migraines.   How long will I be on this medication for? The amount of time you will be on this medication will be determined by your doctor and your response to the medication.    How do I take this medication? Take as directed on your prescription label. This medication is a self-injection given every 28 days.    What are some possible side effects? Injection site reactions and hypersensitivity reactions, constipation, new or worsening hypertension.   What happens if I miss a dose? If you miss a dose, take it as soon as you remember, and time next dose 28 days from last dose.                  Medication Safety   What are things I should warn my doctor immediately about? Hypersensitivity reactions, development or worsening of  hypertension that may occur anytime during treatment. Tell you doctor if you develop new or worsening hypertension    What are things that I should be cautious of? Injection site reaction, constipation, new or worsening hypertension.   What are some medications that can interact with this one? No drug interactions identified.            Medication Storage/Handling   How should I handle this medication? Keep this medication our of reach of pets/children in original container.  On the day your Aimovig is due let it set at room temperature for 30 minutes prior to injection. (do NOT warm using a heat source such as hot water or a microwave).  Administer in the abdomen, thigh, back of the upper arm, or buttocks.  Do not inject where the skin is tender, bruised, red or hard.  Rotate injection sites.   How does this medication need to be stored? Store in refrigerator and keep dry.   How should I dispose of this medication? You can dispose of the empty syringe in a sharps container, and if this is not available you may use an empty hard plastic container such as a milk jug or tide container.            Resources/Support   How can I remind myself to take this medication? You can download a reminder marielle on your phone or use a calandar  to help with your monthly injection.   Is financial support available?  Yes, [employer] can provide co-pay cards if you have commercial insurance or patient assistance if you have Medicare or no insurance.    Which vaccines are recommended for me? Talk to your doctor about these vaccines: Flu, Coronavirus (COVID-19), Pneumococcal (pneumonia), Tdap, Hepatitis B, Zoster (shingles)               Adherence and Self-Administration (new medication)  • Barriers to Patient Adherence and/or Self-Administration: none   • Methods for Supporting Patient Adherence and/or Self-Administration: N/A     Goals of Therapy (new medication)  • Patient Goals of Therapy: take medication every 30  days  • Clinical Goals or Therapeutic Targets, If Applicable: reduce headache by 50%   •      Adverse Drug Reactions (current medication)  • Adverse Reactions Experienced: injection site reaction   • Plan for ADR Management: changing from ajovy to aimovig    Adherence and Self-Administration (currently medication)  • Approximate Number of Doses Missed Since Last Assessment: none  • Ongoing or New Barriers to Patient Adherence and/or Self-Administration: none   • Methods for Supporting Patient Adherence and/or Self-Administration: N/A     Goals of Therapy  • Progress Toward Meeting Patient-Identified Goals of Therapy: On Track  o New Patient-Identified Goals, If Applicable:     • Progress Toward Meeting Clinical Goals or Therapeutic Targets: On Track  o New Clinical Goals or Therapeutic Targets, If Applicable:     Hospitalizations and Urgent Care Since Last Assessment  • Hospitalizations or Admissions: none  • ED Visits: none  • Urgent Office Visits: none     Quality of Life Assessment   Quality of Life related to the patient's specialty therapy was discussed with the patient. The QOL segment of this outreach has been reviewed and updated.    Quality of Life Assessment  Quality of Life Improvement Scale: No change  Comments on Quality of Life: stoping ajovy and changing to aimovig do to injection sit reaction    Reassessment Plan & Follow-Up  1. Medication Therapy Changes: stop ajovy and start aimovig  2. Additional Plans, Therapy Recommendations, or Therapy Problems to Be Addressed: none   3. Pharmacist to perform regular reassessments no more than (6) months from the previous assessment.  4. Welcome information and patient satisfaction survey to be sent by retail team with patient's initial fill.  5. Care Coordinator to set up future refill outreaches, coordinate prescription delivery, and escalate clinical questions to pharmacist.     Attestation  I attest that the initiated specialty medication(s) are appropriate  for the patient based on my assessment.  If the prescribed therapy is at any point deemed not appropriate based on the current or future assessments, a consultation will be initiated with the patient's specialty care provider to determine the best course of action. The revised plan of therapy will be documented along with any additional patient education provided.     Electronically signed by Can Ji RPH, 03/08/23, 4:45 PM EST.

## 2023-03-14 NOTE — TELEPHONE ENCOUNTER
Armani Vera sent a fax stating insurance will only cover brand name medication derma-smoothe body oil without a prior auth needed.  Wondering if we can switch it over to dermasmooth Patient is aware

## 2023-03-17 ENCOUNTER — TELEPHONE (OUTPATIENT)
Dept: NEUROLOGY | Facility: CLINIC | Age: 38
End: 2023-03-17
Payer: COMMERCIAL

## 2023-03-17 NOTE — TELEPHONE ENCOUNTER
Hub to read:    Left message for patient that I am attempting to complete her Aimovig PA on cover my meds and receiving an error message that patient is inactive. Need patients updated insurance information.

## 2023-03-20 ENCOUNTER — PRIOR AUTHORIZATION (OUTPATIENT)
Dept: NEUROLOGY | Facility: CLINIC | Age: 38
End: 2023-03-20
Payer: COMMERCIAL

## 2023-03-20 ENCOUNTER — SPECIALTY PHARMACY (OUTPATIENT)
Dept: PHARMACY | Facility: TELEHEALTH | Age: 38
End: 2023-03-20
Payer: COMMERCIAL

## 2023-03-30 NOTE — TELEPHONE ENCOUNTER
Rx Refill Note  Requested Prescriptions     Pending Prescriptions Disp Refills   • ubrogepant (Ubrelvy) 100 MG tablet 16 tablet 3     Sig: Take 1 tablet by mouth As Needed (Migraine) for up to 30 days.      Last filled:09/28/2022. Sent with 3 refills.  Last office visit with prescribing clinician: 2/24/2023      Next office visit with prescribing clinician: Visit date not found     Diallo Simon MA  03/30/23, 14:58 EDT

## 2023-04-12 NOTE — PROGRESS NOTES
Neuro Office Visit      Encounter Date: 2023   Patient Name: Diallo Spence  : 1985   MRN: [id number]   PCP:  Cinda Alvarado APRN     Chief Complaint:    Chief Complaint   Patient presents with   • Migraine       History of Present Illness: Diallo Spence is a 38 y.o. female who is here today in Neurology for migraine follow up.    Prior neurologic workup per Dr. Shaikh:  Initial visit: 2022: Patient is a 37-year-old female with past medical history of migraines referred to clinic to establish care for migraines.  She reports that she has had migraines for several years now.  She reports on an average 4-5 migraine days in a week.  She reports her typical migraine starts in the right temporal region associated with sharp pain and describes pain intensity is 10 out of 10.  She also reports associated light sensitivity, nausea but no vomiting.  She reports her typical triggers are stress, certain strong smells.  Back in 2022, while she was at work, she is experienced typical migraine however she started experiencing associated difficulty with speech, right hemibody paresthesias and became significantly weak on her right side for which 911 had to be called and she then was brought to [facility] ED for further evaluation.  Code stroke was called and she was taken for CT perfusion, CT angiogram brain and neck.  All initial work-up was negative and she was considered a candidate for IV TNKase.  She was then admitted to ICU for observation.  Post TNKase CT was negative.  She continued to remain weak on her right side so she was eventually discharged home with outpatient physical therapy.  She reports that with physical therapy she has had about 80% improvement in right-sided weakness.  She is still having frequent migraines though.  She reports that she was started on Topamax and verapamil soon after discharge but both the medications made her really drowsy, sleepy and tired so she  has stopped taking it.  She is also taking Imitrex 50 mg as needed as an abortive treatment but it does not really work well.  She usually has to take 2 doses of Imitrex followed by Excedrin or ibuprofen.  There is no family history of migraines.     Follow-up: 11/9/2022: She is in clinic for regular follow-up.  Since her initial visit in September 2022, she has not had any further episodes of complicated or complex migraine.  She reports that she is now doing Ajovy once a month injection and has completed 2 Ajovy injections.  She reports that migraine intensity and frequency seems to be better.  She did try Ubrelvy as needed as an abortive treatment but reports that it sometimes works and sometimes does not work.  Imitrex works better.  Currently she is on Imitrex 50 mg dose.     Follow-up: 2/24/2023: She is in clinic for regular follow-up.  Since her last visit in November 2022, she reported that she had an intense migraine 2 days ago causing right-sided weakness where she had to go to ED.  She underwent detailed stroke work-up including CT angiogram brain and neck as well as MRI brain.  I reviewed the results of this testing personally.  It did not reveal any acute intracranial abnormalities.  She reports that symptoms started with intense migraine and then she started having right-sided weakness.  She reports that she did take ibuprofen and Tylenol while she was at work which helped only temporarily and after reaching home, she took a dose of Ubrelvy but symptoms continue to progress.  She reports that she received IV cocktail while in ED and it helped resolve migraine almost completely.  However right-sided weakness has persisted.  She reports primarily weakness involving her right leg.  Her right arm weakness has resolved.  She is also wearing AFO on the right to help with walking.  She had to stop taking Ajovy back in December because of severe local injection site reaction.  Her last Ajovy injection was 2  months ago.  It was helping in keeping migraines under good control.    Current visit 4/13/2023:   Patient is in clinic today for follow up for complicated migraines. She has taken her first Aimovig injection and is due for her next injection tomorrow. She reports that she is currently having 4-5 migraines/week (about 16-20 per month) with near daily headaches as well, most migraines lasting >4 hours. She has been taking Imitrex, Ubrelvy, Tylenol, and Ibuprofen for abortive treatment. She states that she is currently taking ibuprofen and tylenol nearly every day. She reports that 2 of the migraines in March resulted in right sided weakness and that she had 1 migraine earlier this week which caused left sided weakness. She has intermittently been wearing her right AFO as the right foot drop has significantly improved and she only needs the AFO after worsening weakness from migraine. She has identified migraine triggers including stress, bright lights, and lack of sleep. Her sleep schedule is inconsistent as her job requires her to work overnight. She feels that her migraines are significantly interfering with her ability to function daily. She also reports noticing increased floaters in her vision over the last few months and has not had a recent eye exam.     Subjective      Review of Systems   Constitutional: Negative for fatigue and fever.   HENT: Negative for trouble swallowing and voice change.    Eyes: Positive for photophobia and visual disturbance.   Respiratory: Negative for cough and shortness of breath.    Cardiovascular: Positive for palpitations. Negative for chest pain.        Intermittent palpitations for which she follows with cardiology   Gastrointestinal: Positive for nausea and vomiting. Negative for diarrhea.        Associated with migraines   Genitourinary: Negative for dysuria and frequency.   Musculoskeletal: Positive for gait problem. Negative for back pain.   Skin:        Reported small  injection site irritation after initial Aimovig injection   Neurological: Positive for weakness and headaches.   Psychiatric/Behavioral: Negative for confusion and hallucinations.          Past Medical History:   Past Medical History:   Diagnosis Date   • ADHD (attention deficit hyperactivity disorder)    • Anxiety    • Colitis 2020    11/3/2020 had colonoscopy and recommend repeat in 5 years   • Depression    • Fatty liver    • GERD (gastroesophageal reflux disease)    • Headache, tension-type    • Hiatal hernia    • Liver hemangioma 06/15/2022   • Liver hemangioma    • Liver problem    • Migraine headache    • Panic disorder    • Peripheral neuropathy    • Psychiatric illness    • Self-injurious behavior        Past Surgical History:   Past Surgical History:   Procedure Laterality Date   •  SECTION     • CHOLECYSTECTOMY     • COLONOSCOPY      / BEBETO Reynolds- Normal    • COLONOSCOPY N/A 2016    Procedure: COLONOSCOPY CPT CODE: 27273;  Surgeon: Sherry Muse DO;  [facility];  Service:    • ENDOSCOPY N/A 2016    Procedure: ESOPHAGOGASTRODUODENOSCOPY WITH BIOPSY CPT CODE: 72496;  Surgeon: Sherry Muse DO;  Location: [facility] OR;  Service:    • GALLBLADDER SURGERY     • NERVE SURGERY      Right arm    • SHOULDER SURGERY     • TONSILLECTOMY     • UPPER GASTROINTESTINAL ENDOSCOPY      Dr. Jeter/ Small hiatal hernia        Family History:   Family History   Problem Relation Age of Onset   • No Known Problems Mother    • Diabetes Father    • Atrial fibrillation Father    • Hypertension Father    • Hyperlipidemia Father    • Drug abuse Brother    • Anxiety disorder Brother    • Alcohol abuse Brother    • Drug abuse Brother    • Depression Brother    • Heart disease Maternal Grandmother    • Bone cancer Paternal Grandfather    • Diabetes Paternal Grandfather    • Dementia Paternal Grandmother    • Heart disease Paternal Grandmother    • Stroke Paternal Grandmother   "  • Diabetes Other    • Heart disease Other    • Hypertension Other        Social History:   Social History     Socioeconomic History   • Marital status:      Spouse name: Davon Goel    • Number of children: 1   • Years of education: [employer]    Tobacco Use   • Smoking status: Never     Passive exposure: Never   • Smokeless tobacco: Never   Vaping Use   • Vaping Use: Never used   Substance and Sexual Activity   • Alcohol use: Yes     Comment: Occasionally   • Drug use: No   • Sexual activity: Yes       Medications:     Current Outpatient Medications:   •  Cholecalciferol (Vitamin D3) 25 MCG (1000 UT) capsule, Take 1 capsule by mouth Daily., Disp: 60 capsule, Rfl: 0  •  Erenumab-aooe (Aimovig) 140 MG/ML auto-injector, Inject 1 ML subcutaneously every 30 days as directed., Disp: 1 mL, Rfl: 11  •  FLUoxetine (PROzac) 40 MG capsule, Take 1 capsule by mouth Daily., Disp: , Rfl:   •  ondansetron ODT (ZOFRAN-ODT) 4 MG disintegrating tablet, Place 1 tablet on the tongue Daily As Needed for Nausea or Vomiting., Disp: 10 tablet, Rfl: 0  •  pantoprazole (Protonix) 40 MG EC tablet, Take 1 tablet by mouth Daily., Disp: 90 tablet, Rfl: 1  •  Riboflavin 400 MG tablet, Take 400 mg by mouth Daily With Breakfast., Disp: 30 tablet, Rfl: 3  •  SUMAtriptan (IMITREX) 100 MG tablet, Take 1 tablet by mouth at the onset of headache. Repeat another dose in 3 hours if no relief. No more than 2 tablets in 1 day and no more than twice in a week., Disp: 9 tablet, Rfl: 3  •  ubrogepant (UBRELVY) 100 MG tablet, Take 1 tablet by mouth As Needed (Migraine) for up to 30 days., Disp: 16 tablet, Rfl: 3  •  methylPREDNISolone (MEDROL) 4 MG dose pack, Take by mouth as directed on package instructions., Disp: 21 tablet, Rfl: 0    Allergies:   Allergies   Allergen Reactions   • Latex Rash       Objective     Objective:    /82   Pulse 76   Ht 167.6 cm (65.98\")   Wt 77.1 kg (169 lb 15.6 oz)   SpO2 99%   BMI 27.45 kg/m² "   Body mass index is 27.45 kg/m².    Physical Exam  Vitals reviewed.   Constitutional:       Appearance: Normal appearance.   HENT:      Head: Normocephalic and atraumatic.      Mouth/Throat:      Mouth: Mucous membranes are moist.      Pharynx: Oropharynx is clear.   Pulmonary:      Effort: Pulmonary effort is normal. No respiratory distress.   Musculoskeletal:      Right lower leg: No edema.      Left lower leg: No edema.   Skin:     General: Skin is warm and dry.   Neurological:      Mental Status: She is alert.          Neurology Exam:    General apperance: NAD.     Mental status: Alert, awake and oriented to time place and person.    Fund of knowledge:  Normal.     Language and Speech: No aphasia or dysarthria.    Naming , Repetition and Comprehension:  Can name objects, repeat a sentence and follow commands. Speech is clear and fluent with good repetition, comprehension, and naming.    Cranial Nerves:   CN II: Visual fields are full. Intact.Pupils - PERRLA  CN III, IV and VI: Extraocular movements are intact. Normal saccades.   CN V: Facial sensation is intact.   CN VII: Muscles of facial expression reveal no asymmetry. Intact.   CN VIII: Hearing is intact. Whispered voice intact.   CN IX and X: Palate elevates symmetrically. Intact  CN XI: Shoulder shrug is intact, decreased strength R shoulder compared to L  CN XII: Tongue is midline without evidence of atrophy or fasciculation.     Motor:  Right UE muscle strength 4-/5. Normal tone.     Left UE muscle strength 4+/5. Normal tone.      Right LE muscle strength 4-/5. Normal tone.     Left LE muscle strength 4+/5. Normal tone.      Sensory: Normal light touch sensation bilaterally.    DTRs: 2+ bilaterally in upper and lower extremities.    Coordination: Normal finger-to-nose    Rhomberg: Negative.    Gait: Slow gait pattern, able to ambulate without assistive device, no foot drop appreciated today, gait is slow and cautious.      Results:   Imaging:     MR  BRAIN WITHOUT IV CONTRAST    Result Date: 2/23/2023  1. No evidence of significant intra-axial abnormality. Images reviewed, interpreted, and dictated by Kevon Quintero MD    CTA NECK / BRAIN STROKE PROTOCOL    Result Date: 2/22/2023  No acute intracranial hemorrhage or large acute cortical infarct. No evidence of vascular injury, aneurysm, hemodynamically significant stenosis or major vessel occlusion of the intracranial arterial system. 0% stenosis of the carotid bulbs by NASCET criteria. Patent vertebral arteries. Images personally reviewed, interpreted and dictated by AR Gloria M.D.    CT brain stroke protocol    Result Date: 2/22/2023  No acute intracranial hemorrhage or large acute cortical infarct. No evidence of vascular injury, aneurysm, hemodynamically significant stenosis or major vessel occlusion of the intracranial arterial system. 0% stenosis of the carotid bulbs by NASCET criteria. Patent vertebral arteries. Images personally reviewed, interpreted and dictated by AR Gloria M.D.         Assessment / Plan      Assessment/Plan:   Diagnoses and all orders for this visit:    1. 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  Dispense: 21 tablet; Refill: 0    2. Foot drop, right    Diallo Goel returns to clinic today for evaluation of complicated migraines. She has completed one Aimovig injection. She previously had good response with Ajovy but this was stopped d/t severe injection site reactions. I am hopeful that with subsequent injections she will have improvement in migraine frequency and intensity. Due to frequent use of Tylenol and Ibuprofen I am concerned that she is also having rebound headaches on top of her migraines, I have advised her to not take Tylenol or Ibuprofen more than twice per  week. I have prescribed Medrol Dose Pack to help break cycle of daily headaches, patient advised to take steroids with food and to not take concurrently with NSAIDs. Also discussed with patient that we may need to consider adding Botox to her treatment regimen down the line if Aimovig does not improve her migraines.  I have also referred her to PT to assist with gait and muscle strengthening, her right foot drop has significantly improved but I think that she would still benefit from continued PT for overall strength and gait training due to the weakness s/p migraine episodes.       Patient Education:       Reviewed medications, potential side effects and signs and symptoms to report. Discussed risk versus benefits of treatment plan with patient and/or family-including medications, labs and radiology that may be ordered. Addressed questions and concerns during visit. Patient and/or family verbalized understanding and agree with plan. Instructed to call the office with any questions and report to ER with any life-threatening symptoms.     Follow Up:   Return in about 6 weeks (around 5/25/2023).    I spent 45 minutes face to face with the patient/mother. I personally spent 50 percent of this time counseling and discussing diagnosis, evaluation, current status, treatment options and management .       During this visit the following were done:  Labs Reviewed []    Labs Ordered []    Radiology Reports Reviewed [x]    Radiology Ordered []    PCP Records Reviewed []    Referring Provider Records Reviewed []    ER Records Reviewed []    Hospital Records Reviewed [x]    History Obtained From Family []    Radiology Images Reviewed []    Other Reviewed [x]   Reviewed prior neurologic workup per Dr. Shaikh  Records Requested []      JAX Wilde  [facility]  [address] [address] [phone]

## 2023-04-13 ENCOUNTER — OFFICE VISIT (OUTPATIENT)
Dept: NEUROLOGY | Facility: CLINIC | Age: 38
End: 2023-04-13
Payer: COMMERCIAL

## 2023-04-13 ENCOUNTER — SPECIALTY PHARMACY (OUTPATIENT)
Dept: PHARMACY | Facility: HOSPITAL | Age: 38
End: 2023-04-13
Payer: COMMERCIAL

## 2023-04-13 VITALS
WEIGHT: 169.97 LBS | DIASTOLIC BLOOD PRESSURE: 82 MMHG | HEART RATE: 76 BPM | BODY MASS INDEX: 27.32 KG/M2 | HEIGHT: 66 IN | SYSTOLIC BLOOD PRESSURE: 118 MMHG | OXYGEN SATURATION: 99 %

## 2023-04-13 DIAGNOSIS — M21.371 FOOT DROP, RIGHT: ICD-10-CM

## 2023-04-13 DIAGNOSIS — G43.419 INTRACTABLE HEMIPLEGIC MIGRAINE WITHOUT STATUS MIGRAINOSUS: Primary | ICD-10-CM

## 2023-04-13 PROBLEM — J30.9 ALLERGIC RHINITIS: Status: ACTIVE | Noted: 2023-04-13

## 2023-04-13 PROBLEM — G47.00 CANNOT SLEEP: Status: ACTIVE | Noted: 2023-04-13

## 2023-04-13 PROBLEM — R07.89 ATYPICAL CHEST PAIN: Status: ACTIVE | Noted: 2023-04-13

## 2023-04-13 PROBLEM — G81.91 RIGHT HEMIPLEGIA: Status: ACTIVE | Noted: 2023-02-22

## 2023-04-13 PROBLEM — G43.409 MIGRAINE, HEMIPLEGIC: Status: ACTIVE | Noted: 2023-02-23

## 2023-04-13 PROBLEM — R00.1 BRADYCARDIA, SINUS: Status: ACTIVE | Noted: 2023-04-13

## 2023-04-13 PROCEDURE — 99214 OFFICE O/P EST MOD 30 MIN: CPT

## 2023-04-13 RX ORDER — METHYLPREDNISOLONE 4 MG/1
TABLET ORAL
Qty: 21 TABLET | Refills: 0 | Status: SHIPPED | OUTPATIENT
Start: 2023-04-13

## 2023-04-13 RX ORDER — POTASSIUM CHLORIDE 1500 MG/1
1 TABLET, FILM COATED, EXTENDED RELEASE ORAL DAILY
COMMUNITY
Start: 2023-02-23 | End: 2023-04-13

## 2023-04-13 NOTE — PROGRESS NOTES
Specialty Pharmacy Refill Coordination Note      Name:  Diallo Spence  :  1985  Date:  2023         Past Medical History:   Diagnosis Date   • ADHD (attention deficit hyperactivity disorder)    • Anxiety    • Colitis 2020    11/3/2020 had colonoscopy and recommend repeat in 5 years   • Depression    • Fatty liver    • GERD (gastroesophageal reflux disease)    • Headache, tension-type    • Hiatal hernia    • Liver hemangioma 06/15/2022   • Liver hemangioma    • Liver problem    • Migraine headache    • Panic disorder    • Peripheral neuropathy    • Psychiatric illness    • Self-injurious behavior        Past Surgical History:   Procedure Laterality Date   •  SECTION     • CHOLECYSTECTOMY     • COLONOSCOPY      / BEBETO Reynolds- Normal    • COLONOSCOPY N/A 2016    Procedure: COLONOSCOPY CPT CODE: 18393;  Surgeon: Sherry Muse DO;  Location: [facility];  Service:    • ENDOSCOPY N/A 2016    Procedure: ESOPHAGOGASTRODUODENOSCOPY WITH BIOPSY CPT CODE: 10428;  Surgeon: Sherry Muse DO;  Location: [facility];  Service:    • GALLBLADDER SURGERY     • NERVE SURGERY      Right arm    • SHOULDER SURGERY     • TONSILLECTOMY     • UPPER GASTROINTESTINAL ENDOSCOPY      Dr. Jeter/ Small hiatal hernia        Social History     Socioeconomic History   • Marital status:      Spouse name: Davon Goel    • Number of children: 1   • Years of education: College [employer]    Tobacco Use   • Smoking status: Never     Passive exposure: Never   • Smokeless tobacco: Never   Vaping Use   • Vaping Use: Never used   Substance and Sexual Activity   • Alcohol use: Yes     Comment: Occasionally   • Drug use: No   • Sexual activity: Yes       Family History   Problem Relation Age of Onset   • No Known Problems Mother    • Diabetes Father    • Atrial fibrillation Father    • Hypertension Father    • Hyperlipidemia Father    • Drug abuse Brother    • Anxiety disorder  Brother    • Alcohol abuse Brother    • Drug abuse Brother    • Depression Brother    • Heart disease Maternal Grandmother    • Bone cancer Paternal Grandfather    • Diabetes Paternal Grandfather    • Dementia Paternal Grandmother    • Heart disease Paternal Grandmother    • Stroke Paternal Grandmother    • Diabetes Other    • Heart disease Other    • Hypertension Other        Allergies   Allergen Reactions   • Latex Rash       Current Outpatient Medications   Medication Sig Dispense Refill   • Cholecalciferol (Vitamin D3) 25 MCG (1000 UT) capsule Take 1 capsule by mouth Daily. 60 capsule 0   • Erenumab-aooe (Aimovig) 140 MG/ML auto-injector Inject 1 ML subcutaneously every 30 days as directed. 1 mL 11   • FLUoxetine (PROzac) 40 MG capsule Take 1 capsule by mouth Daily.     • methylPREDNISolone (MEDROL) 4 MG dose pack Take by mouth as directed on package instructions. 21 tablet 0   • ondansetron ODT (ZOFRAN-ODT) 4 MG disintegrating tablet Place 1 tablet on the tongue Daily As Needed for Nausea or Vomiting. 10 tablet 0   • pantoprazole (Protonix) 40 MG EC tablet Take 1 tablet by mouth Daily. 90 tablet 1   • Riboflavin 400 MG tablet Take 400 mg by mouth Daily With Breakfast. 30 tablet 3   • SUMAtriptan (IMITREX) 100 MG tablet Take 1 tablet by mouth at the onset of headache. Repeat another dose in 3 hours if no relief. No more than 2 tablets in 1 day and no more than twice in a week. 9 tablet 3   • ubrogepant (UBRELVY) 100 MG tablet Take 1 tablet by mouth As Needed (Migraine) for up to 30 days. 16 tablet 3     No current facility-administered medications for this visit.         ASSESSMENT/PLAN:      Diallo is a 38 y.o. female contacted today regarding refills of  Aimovig injection  specialty medication(s).    Reviewed and verified with patient:       Specialty medication(s) and dose(s) confirmed: yes    Refill Questions    Flowsheet Row Most Recent Value   Changes to allergies? No   Changes to medications? No   New  conditions since last clinic visit No   Unplanned office visit, urgent care, ED, or hospital admission in the last 4 weeks  No   How does patient/caregiver feel medication is working? Very good   Financial problems or insurance changes  No   Since the previous refill, were any specialty medication doses or scheduled injections missed or delayed?  No   Does this patient require a clinical escalation to a pharmacist? No                     Follow-up: 28 day(s)     Danika Moffett, Pharmacy Technician  Specialty Pharmacy Technician

## 2023-05-18 ENCOUNTER — SPECIALTY PHARMACY (OUTPATIENT)
Dept: PHARMACY | Facility: HOSPITAL | Age: 38
End: 2023-05-18
Payer: COMMERCIAL

## 2023-05-18 NOTE — PROGRESS NOTES
Specialty Pharmacy Refill Coordination Note      Name:  Diallo Spence  :  1985  Date:  2023         Past Medical History:   Diagnosis Date   • ADHD (attention deficit hyperactivity disorder)    • Anxiety    • Colitis 2020    11/3/2020 had colonoscopy and recommend repeat in 5 years   • Depression    • Fatty liver    • GERD (gastroesophageal reflux disease)    • Headache, tension-type    • Hiatal hernia    • Liver hemangioma 06/15/2022   • Liver hemangioma    • Liver problem    • Migraine headache    • Panic disorder    • Peripheral neuropathy    • Psychiatric illness    • Self-injurious behavior        Past Surgical History:   Procedure Laterality Date   •  SECTION     • CHOLECYSTECTOMY     • COLONOSCOPY      / BEBETO Reynolds- Normal    • COLONOSCOPY N/A 2016    Procedure: COLONOSCOPY CPT CODE: 10660;  Surgeon: Sherry Muse DO;  Location: [facility];  Service:    • ENDOSCOPY N/A 2016    Procedure: ESOPHAGOGASTRODUODENOSCOPY WITH BIOPSY CPT CODE: 22619;  Surgeon: Sherry Muse DO;  Location: [facility];  Service:    • GALLBLADDER SURGERY     • NERVE SURGERY      Right arm    • SHOULDER SURGERY     • TONSILLECTOMY     • UPPER GASTROINTESTINAL ENDOSCOPY      Dr. Jeter/ Small hiatal hernia        Social History     Socioeconomic History   • Marital status:      Spouse name: Davon Goel    • Number of children: 1   • Years of education: [employer]    Tobacco Use   • Smoking status: Never     Passive exposure: Never   • Smokeless tobacco: Never   Vaping Use   • Vaping Use: Never used   Substance and Sexual Activity   • Alcohol use: Yes     Comment: Occasionally   • Drug use: No   • Sexual activity: Yes       Family History   Problem Relation Age of Onset   • No Known Problems Mother    • Diabetes Father    • Atrial fibrillation Father    • Hypertension Father    • Hyperlipidemia Father    • Drug abuse Brother    • Anxiety disorder  Brother    • Alcohol abuse Brother    • Drug abuse Brother    • Depression Brother    • Heart disease Maternal Grandmother    • Bone cancer Paternal Grandfather    • Diabetes Paternal Grandfather    • Dementia Paternal Grandmother    • Heart disease Paternal Grandmother    • Stroke Paternal Grandmother    • Diabetes Other    • Heart disease Other    • Hypertension Other        Allergies   Allergen Reactions   • Latex Rash       Current Outpatient Medications   Medication Sig Dispense Refill   • Cholecalciferol (Vitamin D3) 25 MCG (1000 UT) capsule Take 1 capsule by mouth Daily. 60 capsule 0   • Erenumab-aooe (Aimovig) 140 MG/ML auto-injector Inject 1 ML subcutaneously every 30 days as directed. 1 mL 11   • FLUoxetine (PROzac) 40 MG capsule Take 1 capsule by mouth Daily.     • methylPREDNISolone (MEDROL) 4 MG dose pack Take by mouth as directed on package instructions. 21 tablet 0   • ondansetron ODT (ZOFRAN-ODT) 4 MG disintegrating tablet Place 1 tablet on the tongue Daily As Needed for Nausea or Vomiting. 10 tablet 0   • pantoprazole (Protonix) 40 MG EC tablet Take 1 tablet by mouth Daily. 90 tablet 1   • Riboflavin 400 MG tablet Take 400 mg by mouth Daily With Breakfast. 30 tablet 3   • SUMAtriptan (IMITREX) 100 MG tablet Take 1 tablet by mouth at the onset of headache. Repeat another dose in 3 hours if no relief. No more than 2 tablets in 1 day and no more than twice in a week. 9 tablet 3   • ubrogepant (UBRELVY) 100 MG tablet Take 1 tablet by mouth As Needed (Migraine) for up to 30 days. 16 tablet 3     No current facility-administered medications for this visit.         ASSESSMENT/PLAN:      Diallo is a 38 y.o. female contacted today regarding refills of  Aimovig 140mg injection and ubrelvy 100mg tablet  specialty medication(s).    Reviewed and verified with patient:       Specialty medication(s) and dose(s) confirmed: yes    Refill Questions    Flowsheet Row Most Recent Value   Changes to allergies? No    Changes to medications? No   New conditions since last clinic visit No   Unplanned office visit, urgent care, ED, or hospital admission in the last 4 weeks  No   How does patient/caregiver feel medication is working? Very good   Financial problems or insurance changes  No   Since the previous refill, were any specialty medication doses or scheduled injections missed or delayed?  No   Does this patient require a clinical escalation to a pharmacist? No                     Follow-up: 30 day(s)     Danika Moffett, Pharmacy Technician  Specialty Pharmacy Technician

## 2023-05-25 ENCOUNTER — OFFICE VISIT (OUTPATIENT)
Dept: NEUROLOGY | Facility: CLINIC | Age: 38
End: 2023-05-25
Payer: COMMERCIAL

## 2023-05-25 VITALS
DIASTOLIC BLOOD PRESSURE: 72 MMHG | BODY MASS INDEX: 27.32 KG/M2 | OXYGEN SATURATION: 100 % | SYSTOLIC BLOOD PRESSURE: 132 MMHG | HEIGHT: 66 IN | HEART RATE: 66 BPM | WEIGHT: 169.97 LBS

## 2023-05-25 DIAGNOSIS — G43.419 INTRACTABLE HEMIPLEGIC MIGRAINE WITHOUT STATUS MIGRAINOSUS: Primary | ICD-10-CM

## 2023-05-25 DIAGNOSIS — F44.4 FUNCTIONAL NEUROLOGICAL SYMPTOM DISORDER WITH WEAKNESS OR PARALYSIS: ICD-10-CM

## 2023-05-25 NOTE — Clinical Note
Marciano Love, can we please start the process for botox for chronic migraines for Diallo? Thank you!

## 2023-05-25 NOTE — PROGRESS NOTES
Neuro Office Visit      Encounter Date: 2023   Patient Name: Diallo Spence  : 1985   MRN: [id number]   PCP:  Cinda Alvarado APRN     Chief Complaint:    Chief Complaint   Patient presents with   • Migraine       History of Present Illness: Diallo Spence is a 38 y.o. female who is here today in Neurology for migraine    Prior neurologic workup per Dr. Shaikh:  Initial visit: 2022: Patient is a 37-year-old female with past medical history of migraines referred to clinic to establish care for migraines.  She reports that she has had migraines for several years now.  She reports on an average 4-5 migraine days in a week.  She reports her typical migraine starts in the right temporal region associated with sharp pain and describes pain intensity is 10 out of 10.  She also reports associated light sensitivity, nausea but no vomiting.  She reports her typical triggers are stress, certain strong smells.  Back in 2022, while she was at work, she is experienced typical migraine however she started experiencing associated difficulty with speech, right hemibody paresthesias and became significantly weak on her right side for which 911 had to be called and she then was brought to [facility] ED for further evaluation.  Code stroke was called and she was taken for CT perfusion, CT angiogram brain and neck.  All initial work-up was negative and she was considered a candidate for IV TNKase.  She was then admitted to ICU for observation.  Post TNKase CT was negative.  She continued to remain weak on her right side so she was eventually discharged home with outpatient physical therapy.  She reports that with physical therapy she has had about 80% improvement in right-sided weakness.  She is still having frequent migraines though.  She reports that she was started on Topamax and verapamil soon after discharge but both the medications made her really drowsy, sleepy and tired so she has  stopped taking it.  She is also taking Imitrex 50 mg as needed as an abortive treatment but it does not really work well.  She usually has to take 2 doses of Imitrex followed by Excedrin or ibuprofen.  There is no family history of migraines.     Follow-up: 11/9/2022: She is in clinic for regular follow-up.  Since her initial visit in September 2022, she has not had any further episodes of complicated or complex migraine.  She reports that she is now doing Ajovy once a month injection and has completed 2 Ajovy injections.  She reports that migraine intensity and frequency seems to be better.  She did try Ubrelvy as needed as an abortive treatment but reports that it sometimes works and sometimes does not work.  Imitrex works better.  Currently she is on Imitrex 50 mg dose.     Follow-up: 2/24/2023: She is in clinic for regular follow-up.  Since her last visit in November 2022, she reported that she had an intense migraine 2 days ago causing right-sided weakness where she had to go to ED.  She underwent detailed stroke work-up including CT angiogram brain and neck as well as MRI brain.  I reviewed the results of this testing personally.  It did not reveal any acute intracranial abnormalities.  She reports that symptoms started with intense migraine and then she started having right-sided weakness.  She reports that she did take ibuprofen and Tylenol while she was at work which helped only temporarily and after reaching home, she took a dose of Ubrelvy but symptoms continue to progress.  She reports that she received IV cocktail while in ED and it helped resolve migraine almost completely.  However right-sided weakness has persisted.  She reports primarily weakness involving her right leg.  Her right arm weakness has resolved.  She is also wearing AFO on the right to help with walking.  She had to stop taking Ajovy back in December because of severe local injection site reaction.  Her last Ajovy injection was 2 months  ago.  It was helping in keeping migraines under good control.     First visit with me 4/13/2023:   Patient is in clinic today for follow up for complicated migraines. She has taken her first Aimovig injection and is due for her next injection tomorrow. She reports that she is currently having 4-5 migraines/week (about 16-20 per month) with near daily headaches as well, most migraines lasting >4 hours. She has been taking Imitrex, Ubrelvy, Tylenol, and Ibuprofen for abortive treatment. She states that she is currently taking ibuprofen and tylenol nearly every day. She reports that 2 of the migraines in March resulted in right sided weakness and that she had 1 migraine earlier this week which caused left sided weakness. She has intermittently been wearing her right AFO as the right foot drop has significantly improved and she only needs the AFO after worsening weakness from migraine. She has identified migraine triggers including stress, bright lights, and lack of sleep. Her sleep schedule is inconsistent as her job requires her to work overnight. She feels that her migraines are significantly interfering with her ability to function daily. She also reports noticing increased floaters in her vision over the last few months and has not had a recent eye exam.      Current visit 5/25/2023:  Diallo Jordy returns to neurology clinic for continued evaluation of complex migraines. She has continued with Aimovig and reports very mild injection site reactions. She is still having complex hemiplegic migraines, reports that since her last clinic visit she has had 3 episodes that involved right sided weakness and 4 episodes of left sided weakness. She also had a new episode this past Tuesday in which she felt locked-in and that she couldn't move or open her eyes, she did not have any loss of consciousness but was unable to open her eyes, she sought care at [facility] and underwent CT head which she reports was negative  and she was discharged home from ED when her symptoms improved. She did not have a headache on Tuesday but reports that she did have a horrible migraine the day prior. Despite Aimovig injections her migraine frequency has not significantly improved and she still has many weeks with more than 4 migraine days. She was prescribed Medrol Dose Pack at her last visit due to concern for rebound headaches from frequent NSAID use.     Subjective      Review of Systems   Eyes: Positive for photophobia.   Respiratory: Negative.    Cardiovascular: Negative.    Gastrointestinal: Positive for nausea.   Endocrine: Negative.    Genitourinary: Negative.    Musculoskeletal: Positive for gait problem.   Skin:        Mild injection site reaction   Allergic/Immunologic: Negative.    Neurological: Positive for weakness and headaches.   Psychiatric/Behavioral: Positive for sleep disturbance. The patient is nervous/anxious.           Past Medical History:   Past Medical History:   Diagnosis Date   • ADHD (attention deficit hyperactivity disorder)    • Anxiety    • Colitis 2020    11/3/2020 had colonoscopy and recommend repeat in 5 years   • Depression    • Fatty liver    • GERD (gastroesophageal reflux disease)    • Headache, tension-type    • Hiatal hernia    • Liver hemangioma 06/15/2022   • Liver hemangioma    • Liver problem    • Migraine headache    • Panic disorder    • Peripheral neuropathy    • Psychiatric illness    • Self-injurious behavior        Past Surgical History:   Past Surgical History:   Procedure Laterality Date   •  SECTION     • CHOLECYSTECTOMY     • COLONOSCOPY      / BEBETO Reynolds- Normal    • COLONOSCOPY N/A 2016    Procedure: COLONOSCOPY CPT CODE: 42272;  Surgeon: Sherry Muse DO;  Location: [facility] OR;  Service:    • ENDOSCOPY N/A 2016    Procedure: ESOPHAGOGASTRODUODENOSCOPY WITH BIOPSY CPT CODE: 51466;  Surgeon: Sherry Muse DO;  Location: [facility] OR;  Service:    •  GALLBLADDER SURGERY     • NERVE SURGERY      Right arm    • SHOULDER SURGERY     • TONSILLECTOMY     • UPPER GASTROINTESTINAL ENDOSCOPY  2010    Dr. Jeter/ Small hiatal hernia        Family History:   Family History   Problem Relation Age of Onset   • No Known Problems Mother    • Diabetes Father    • Atrial fibrillation Father    • Hypertension Father    • Hyperlipidemia Father    • Drug abuse Brother    • Anxiety disorder Brother    • Alcohol abuse Brother    • Drug abuse Brother    • Depression Brother    • Heart disease Maternal Grandmother    • Bone cancer Paternal Grandfather    • Diabetes Paternal Grandfather    • Dementia Paternal Grandmother    • Heart disease Paternal Grandmother    • Stroke Paternal Grandmother    • Diabetes Other    • Heart disease Other    • Hypertension Other        Social History:   Social History     Socioeconomic History   • Marital status:      Spouse name: Davon Goel    • Number of children: 1   • Years of education: College - [employer]    Tobacco Use   • Smoking status: Never     Passive exposure: Never   • Smokeless tobacco: Never   Vaping Use   • Vaping Use: Never used   Substance and Sexual Activity   • Alcohol use: Yes     Comment: Occasionally   • Drug use: No   • Sexual activity: Yes       Medications:     Current Outpatient Medications:   •  Cholecalciferol (Vitamin D3) 25 MCG (1000 UT) capsule, Take 1 capsule by mouth Daily., Disp: 60 capsule, Rfl: 0  •  Erenumab-aooe (Aimovig) 140 MG/ML auto-injector, Inject 1 ML subcutaneously every 30 days as directed., Disp: 1 mL, Rfl: 11  •  FLUoxetine (PROzac) 40 MG capsule, Take 1 capsule by mouth Daily., Disp: , Rfl:   •  ondansetron ODT (ZOFRAN-ODT) 4 MG disintegrating tablet, Place 1 tablet on the tongue Daily As Needed for Nausea or Vomiting., Disp: 10 tablet, Rfl: 0  •  pantoprazole (Protonix) 40 MG EC tablet, Take 1 tablet by mouth Daily., Disp: 90 tablet, Rfl: 1  •  SUMAtriptan (IMITREX) 100 MG tablet, Take 1  "tablet by mouth at the onset of headache. Repeat another dose in 3 hours if no relief. No more than 2 tablets in 1 day and no more than twice in a week., Disp: 9 tablet, Rfl: 3  •  ubrogepant (UBRELVY) 100 MG tablet, Take 1 tablet by mouth As Needed (Migraine) for up to 30 days., Disp: 16 tablet, Rfl: 3    Allergies:   Allergies   Allergen Reactions   • Latex Rash         STEADI Fall Risk Assessment has not been completed.    Objective     Objective:    /72   Pulse 66   Ht 167.6 cm (65.98\")   Wt 77.1 kg (169 lb 15.6 oz)   SpO2 100%   BMI 27.45 kg/m²   Body mass index is 27.45 kg/m².    Physical Exam  Vitals reviewed.   Constitutional:       General: She is not in acute distress.     Appearance: Normal appearance. She is not ill-appearing.   HENT:      Head: Normocephalic and atraumatic.   Pulmonary:      Effort: Pulmonary effort is normal. No respiratory distress.   Skin:     General: Skin is warm and dry.   Neurological:      Mental Status: She is alert.          Neurology Exam:    General apperance: NAD.     Mental status: Alert, awake and oriented to time place and person.    Fund of knowledge:  Normal.     Language and Speech: Mild stuttering    Naming , Repetition and Comprehension:  Can name objects, repeat a sentence and follow commands. Speech is clear and fluent with good repetition, comprehension, and naming.    Cranial Nerves:   CN II: Visual fields are full. Intact. Pupils - PERRLA  CN III, IV and VI: Extraocular movements are intact. Normal saccades.   CN V: Facial sensation is intact.   CN VII: Muscles of facial expression reveal no asymmetry. Intact.   CN VIII: Hearing is intact.   CN IX and X: Palate elevates symmetrically. Intact  CN XI: Shoulder shrug is intact.   CN XII: Tongue is midline without evidence of atrophy or fasciculation.     Motor:  Right UE muscle strength 5/5. Normal tone.     Left UE muscle strength 5/5. Normal tone.      Right LE muscle strength 4+5/5. Normal tone. "     Left LE muscle strength 4+/5. Normal tone.      Sensory: Normal light touch sensation bilaterally.    DTRs: 2+ bilaterally in upper and lower extremities.    Gait: Slow cautious gait. No assistive device.      Results:   Imaging:   XR Chest 1 View    Result Date: 2/22/2023  No acute cardiopulmonary findings. Images personally reviewed, interpreted and dictated by AR Gloria M.D.    MR BRAIN WITHOUT IV CONTRAST    Result Date: 2/23/2023  1. No evidence of significant intra-axial abnormality. Images reviewed, interpreted, and dictated by Kevon Quintero MD    CTA NECK / BRAIN STROKE PROTOCOL    Result Date: 2/22/2023  No acute intracranial hemorrhage or large acute cortical infarct. No evidence of vascular injury, aneurysm, hemodynamically significant stenosis or major vessel occlusion of the intracranial arterial system. 0% stenosis of the carotid bulbs by NASCET criteria. Patent vertebral arteries. Images personally reviewed, interpreted and dictated by AR Gloria M.D.    CT brain stroke protocol    Result Date: 2/22/2023  No acute intracranial hemorrhage or large acute cortical infarct. No evidence of vascular injury, aneurysm, hemodynamically significant stenosis or major vessel occlusion of the intracranial arterial system. 0% stenosis of the carotid bulbs by NASCET criteria. Patent vertebral arteries. Images personally reviewed, interpreted and dictated by AR Gloria M.D.       Labs:       Assessment / Plan      Assessment/Plan:   Diagnoses and all orders for this visit:    1. 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 She has not had good migraine control despite 3 months of Aimovig injections, she is having 15+ migraine days/month, we have discussed  botox and she would like to start injections. We will continue Aimovig for migraine prevention and also can take Ubrelvy as needed for abortive treatment.     She also had an episode in clinic today in which she couldn't open her eyes or talk, she was able to follow commands during this episode, she was evaluated alongside Dr. Sanchez during this episode and it was felt to be concerning for conversion disorder, she did not loose consciousness, no loss of bowel or bladder, no tongue bite, she was able to converse normally after episode, patient does report high degree of past and current stress.  I have placed referral to psychotherapy for continued assistance.     Will plan to see patient back in clinic next for botox injections for chronic migraines pending insurance approval.     Patient Education:       Reviewed medications, potential side effects and signs and symptoms to report. Discussed risk versus benefits of treatment plan with patient and/or family-including medications, labs and radiology that may be ordered. Addressed questions and concerns during visit. Patient and/or family verbalized understanding and agree with plan. Instructed to call the office with any questions and report to ER with any life-threatening symptoms.     Follow Up:   Return for Botox.    I spent 45 minutes face to face with the patient and mother. I personally spent 50 percent of this time counseling and discussing diagnosis, treatment options, and management.      During this visit the following were done:  Labs Reviewed []    Labs Ordered []    Radiology Reports Reviewed []    Radiology Ordered []    PCP Records Reviewed []    Referring Provider Records Reviewed []    ER Records Reviewed []    Hospital Records Reviewed []    History Obtained From Family []    Radiology Images Reviewed []    Other Reviewed []    Records Requested []      JAX Wilde  [facility]OLOG[facility]  [id number] [id number] [phone]

## 2023-08-01 ENCOUNTER — SPECIALTY PHARMACY (OUTPATIENT)
Dept: NEUROLOGY | Facility: CLINIC | Age: 38
End: 2023-08-01
Payer: COMMERCIAL

## 2023-10-04 ENCOUNTER — TELEPHONE (OUTPATIENT)
Dept: NEUROLOGY | Facility: CLINIC | Age: 38
End: 2023-10-04
Payer: COMMERCIAL

## 2023-10-04 NOTE — TELEPHONE ENCOUNTER
[facility] received a fax that requires your attention. The document has been indexed to the patient's chart for your review.      Reason for sending: SO PROVIDER IS AWARE OF REQUEST IN THE PATIENT'S CHART    Documents Description: PATIENT PRESCRIPTION REQUEST 10/1/23    Name of Sender: [facility]    Date Indexed: 10/4/23

## 2023-10-05 ENCOUNTER — TELEPHONE (OUTPATIENT)
Dept: NEUROLOGY | Facility: CLINIC | Age: 38
End: 2023-10-05

## 2023-10-05 NOTE — TELEPHONE ENCOUNTER
Caller: [facility] ([facility]) - [facility] [address] - [phone]  - [phone]    Relationship: Pharmacy    Best call back number:[phone]    Requested Prescriptions:   Requested Prescriptions      No prescriptions requested or ordered in this encounter        Pharmacy where request should be sent:      Last office visit with prescribing clinician: Visit date not found   Last telemedicine visit with prescribing clinician: Visit date not found   Next office visit with prescribing clinician: Visit date not found       Kimberly Reese Rep   10/05/23 16:17 EDT

## 2023-10-25 ENCOUNTER — PROCEDURE VISIT (OUTPATIENT)
Dept: NEUROLOGY | Facility: CLINIC | Age: 38
End: 2023-10-25
Payer: COMMERCIAL

## 2023-10-25 DIAGNOSIS — G43.719 INTRACTABLE CHRONIC MIGRAINE WITHOUT AURA AND WITHOUT STATUS MIGRAINOSUS: Primary | ICD-10-CM

## 2023-10-25 NOTE — PROGRESS NOTES
CC:  Migraines.    HPI : Patient is in clinic for first Botox injection.  Current headache frequency is approximately ---  15-20ache days in a month with some headaches lasting for more than 4 hours.      Botox Procedure Note    Current headache frequency: 15-20 headaches days / month.    The risks and benefits were discussed with the patient. The patient was given the opportunity to ask questions. Informed consent form was signed.    Onabotulinumtoxin A was reconstituted with 0.9% normal saline. All injections sites were prepped with alcohol swab. Approximately 5 units of botox were injected into each of the following sites bilaterally as per routine migraine botox injection PREEMPT protocol: , procerus, frontalis, temporalis, occipitalis, upper cervical paraspinals, and trapezius.    The patient tolerated the procedure well. There were no immediate complications. The patient will follow up in approximately 12 weeks for her next injection.    Total amount of onabotulinumtoxin A injected was 155 units with 45 units wasted.    Additional notes: She reports that migraines have become bit better few weeks ago after she had chiropractic manipulation but it seems to be getting worse now.  I am hoping that with addition of Botox and use of Aimovig once a month injection, migraine frequency and intensity will improve.  Continue with sumatriptan as needed as an abortive treatment and I will see her back in 12 weeks for next injection

## 2024-01-25 ENCOUNTER — TELEPHONE (OUTPATIENT)
Dept: NEUROLOGY | Facility: CLINIC | Age: 39
End: 2024-01-25
Payer: COMMERCIAL

## 2024-01-25 NOTE — TELEPHONE ENCOUNTER
Called cell phone, and lm, to check on her insurance coverage.  We need insurance cards front and back faxed to us, or put into my chart.  Please transfer to office.

## 2024-02-02 ENCOUNTER — TELEPHONE (OUTPATIENT)
Dept: NEUROLOGY | Facility: CLINIC | Age: 39
End: 2024-02-02
Payer: COMMERCIAL

## 2024-02-02 NOTE — TELEPHONE ENCOUNTER
Pharmacy Name: [facility] (NOW [facility]) - [facility] [address] - [phone]  - [phone] FX     Pharmacy representative name: HU    Pharmacy representative phone number: [phone]    What medication are you calling in regards to: BOTOX    What question does the pharmacy have: LEONORLISA STATES THE BOTOX IS REQUIRING A PRIOR AUTHORIZATION. THEY DID ATTEMPT TO START THE PA THROUGH [employer], HOWEVER, THEY WERE RECEIVING AN ERROR CODE. THEY CONTACT PT'S PLAN AND WERE ADVISED THAT THE OFFICE WOULD NEED TO CONTACT THE PLAN TO INITIATE THE PA.    PT'S PLAN INFORMATION:  PH [phone]  FX [phone]    **HU DOES NOTE THAT IF OFFICE PLANS TO USE [facility] TO FILL THE BOTOX, THE BOTOX MUST BE FILED UNDER PHARMACY BENEFITS.    Who is the provider that prescribed the medication: DR. HERRERA    PLEASE REVIEW AND ADVISE.     done

## 2024-02-05 NOTE — TELEPHONE ENCOUNTER
"Relay     \"Called Diallo to get both health and prescription insurance card information. She can also upload images of the front and back of each to [employer].     No answer and no option to leave voicemail.       Cain RICHARD \"                "

## 2024-04-05 ENCOUNTER — TRANSCRIBE ORDERS (OUTPATIENT)
Dept: ADMINISTRATIVE | Facility: HOSPITAL | Age: 39
End: 2024-04-05
Payer: COMMERCIAL

## 2024-04-05 DIAGNOSIS — R56.9 SEIZURE: Primary | ICD-10-CM

## 2024-04-08 ENCOUNTER — HOSPITAL ENCOUNTER (OUTPATIENT)
Dept: RESPIRATORY THERAPY | Facility: HOSPITAL | Age: 39
Discharge: HOME | End: 2024-04-08
Payer: COMMERCIAL

## 2024-04-08 DIAGNOSIS — R56.9 SEIZURE: ICD-10-CM

## 2024-04-08 PROCEDURE — 95819 EEG AWAKE AND ASLEEP: CPT | Performed by: PSYCHIATRY & NEUROLOGY

## 2024-04-08 PROCEDURE — 95819 EEG AWAKE AND ASLEEP: CPT

## 2024-04-17 ENCOUNTER — OFFICE VISIT (OUTPATIENT)
Dept: NEUROLOGY | Facility: CLINIC | Age: 39
End: 2024-04-17
Payer: COMMERCIAL

## 2024-04-17 VITALS
SYSTOLIC BLOOD PRESSURE: 124 MMHG | OXYGEN SATURATION: 100 % | DIASTOLIC BLOOD PRESSURE: 82 MMHG | HEIGHT: 66 IN | BODY MASS INDEX: 27.45 KG/M2 | HEART RATE: 78 BPM

## 2024-04-17 DIAGNOSIS — R56.9 SEIZURE-LIKE ACTIVITY: Primary | ICD-10-CM

## 2024-04-17 DIAGNOSIS — G43.719 INTRACTABLE CHRONIC MIGRAINE WITHOUT AURA AND WITHOUT STATUS MIGRAINOSUS: ICD-10-CM

## 2024-04-17 PROCEDURE — 99215 OFFICE O/P EST HI 40 MIN: CPT | Performed by: PSYCHIATRY & NEUROLOGY

## 2024-04-17 RX ORDER — LAMOTRIGINE 100 MG/1
100 TABLET ORAL 2 TIMES DAILY
Qty: 60 TABLET | Refills: 6 | Status: SHIPPED | OUTPATIENT
Start: 2024-04-17 | End: 2025-04-17

## 2024-04-17 RX ORDER — KETOROLAC TROMETHAMINE 10 MG/1
10 TABLET, FILM COATED ORAL AS NEEDED
COMMUNITY
Start: 2024-03-06

## 2024-04-17 RX ORDER — PROCHLORPERAZINE MALEATE 10 MG
10 TABLET ORAL DAILY
COMMUNITY
Start: 2024-03-20

## 2024-04-17 RX ORDER — LAMOTRIGINE 25 MG/1
75 TABLET ORAL 2 TIMES DAILY
Qty: 180 TABLET | Refills: 0 | Status: SHIPPED | OUTPATIENT
Start: 2024-04-17 | End: 2024-05-17

## 2024-04-17 RX ORDER — TOPIRAMATE 100 MG/1
50 TABLET, FILM COATED ORAL 2 TIMES DAILY
COMMUNITY
Start: 2024-04-10

## 2024-04-17 NOTE — PROGRESS NOTES
Subjective:    CC: Diallo Spence is in clinic today for follow up for   history of migraines and new onset seizures.    HPI:  She is in clinic for follow-up after her last Botox which was in October 2023.  She reports that she had to stop Botox as insurance stopped covering it.  Since stopping Botox, she has noticed increased migraine frequency and intensity significantly.  In addition to this, she reports that on April 4, 2024, she started having generalized tonic-clonic seizure.  First episode was when she was at work and witnessed by her PCP JAX Mustafa.  She was sitting in a chair at desk and appeared to be staring into space.  She developed bilateral eye twitching and could not verbally respond or move intentionally or voluntarily.  She slammed back into her seat and then stiffened and convulsed.  The episode lasted for less than 5 minutes.  Following this, EMS was called and prior to arrival of EMS, she had another 2 or 3 episodes.  She was airlifted to [facility] and ended out had couple of more such episodes requiring IV Ativan.  While in hospital, she underwent basic lab work and CT head which was unremarkable.  She was eventually discharged home and after coming home, she had a few more episodes following which, she was taken to [facility] ED.  She underwent EEG which did not reveal any epileptiform abnormalities or electrographic seizures.  After this ED visit, she had had several episodes at home.  She was started on Topamax and was instructed to increase the dose.  She currently is on 50 mg twice daily dose but reports that it has not really helped with seizure prevention.  She also that she has had episodes of staring off in space and not able to respond to questions being asked in the past but never has had generalized tonic-clonic activity.  She denies any family history of seizures.  She was born full-term and achieved all her milestones on time.  She does report increased stress  in last 6 months related to custody of her daughter and her ex-.  She does report anxiety and sometimes depressed mood for which she is on Trintellix.  She follows up with psychiatrist for the same.  As far as migraines are concerned, she responded really well to Ajovy but had to be stopped due to local injection site reaction.  She is willing to restart Ajovy back to bring migraine frequency and intensity under control.  She has a different insurance of Botox can be considered in future if need be.    The following portions of the patient'shistory were reviewed and updated as of 04/17/2024: allergies, social history, and problem list.       Current Outpatient Medications:     Cholecalciferol (Vitamin D3) 25 MCG (1000 UT) capsule, Take 1 capsule by mouth Daily., Disp: 60 capsule, Rfl: 0    ketorolac (TORADOL) 10 MG tablet, Take 1 tablet by mouth As Needed., Disp: , Rfl:     pantoprazole (Protonix) 40 MG EC tablet, Take 1 tablet by mouth Daily., Disp: 90 tablet, Rfl: 1    prochlorperazine (COMPAZINE) 10 MG tablet, Take 1 tablet by mouth Daily. Takes with trintellix daily due to n/v, Disp: , Rfl:     topiramate (TOPAMAX) 100 MG tablet, Take 0.5 tablets by mouth 2 (Two) Times a Day. 50mg BID currently and tapering up to 100mg BID, Disp: , Rfl:     Vortioxetine HBr (TRINTELLIX) 10 MG tablet tablet, Take 1 tablet by mouth Daily. Takes with compazine daily due to n/v, Disp: , Rfl:     lamoTRIgine (LaMICtal) 100 MG tablet, Take 1 tablet by mouth 2 (Two) Times a Day., Disp: 60 tablet, Rfl: 6    lamoTRIgine (LaMICtal) 25 MG tablet, Take 3 tablets by mouth 2 (Two) Times a Day for 30 days., Disp: 180 tablet, Rfl: 0    OnabotulinumtoxinA 200 units reconstituted solution, Inject 155 units IM into head neck shoulders every 12 weeks per FDA protocol. Dx. G43.719 Run under medical benefit (Patient not taking: Reported on 4/17/2024), Disp: 1 each, Rfl: 3    Current Facility-Administered Medications:     OnabotulinumtoxinA 200  "Units, 200 Units, Intramuscular, Q3 Months, Joao Shaikh MD, 200 Units at 10/25/23 1532   Past Medical History:   Diagnosis Date    ADHD (attention deficit hyperactivity disorder)     Anxiety     Cluster headache     Colitis 2020    11/3/2020 had colonoscopy and recommend repeat in 5 years    Depression     Fatty liver     GERD (gastroesophageal reflux disease)     Headache, tension-type     Hiatal hernia     Liver hemangioma 06/15/2022    Liver hemangioma     Liver problem     Migraine headache     Panic disorder     Peripheral neuropathy     Psychiatric illness     Self-injurious behavior       Past Surgical History:   Procedure Laterality Date     SECTION      CHOLECYSTECTOMY      COLONOSCOPY      / BEBETO Reynolds- Normal     COLONOSCOPY N/A 2016    Procedure: COLONOSCOPY CPT CODE: 93500;  Surgeon: Sherry Muse DO;  Location:  [facility];  Service:     ENDOSCOPY N/A 2016    Procedure: ESOPHAGOGASTRODUODENOSCOPY WITH BIOPSY CPT CODE: 14687;  Surgeon: Sherry Muse DO;  Location:  [facility];  Service:     GALLBLADDER SURGERY      NERVE SURGERY      Right arm     SHOULDER SURGERY      TONSILLECTOMY      UPPER GASTROINTESTINAL ENDOSCOPY      Dr. Jeter/ Small hiatal hernia       Family History   Problem Relation Age of Onset    No Known Problems Mother     Diabetes Father     Atrial fibrillation Father     Hypertension Father     Hyperlipidemia Father     Drug abuse Brother     Anxiety disorder Brother     Alcohol abuse Brother     Drug abuse Brother     Depression Brother     Heart disease Maternal Grandmother     Bone cancer Paternal Grandfather     Diabetes Paternal Grandfather     Dementia Paternal Grandmother     Heart disease Paternal Grandmother     Stroke Paternal Grandmother     Diabetes Other     Heart disease Other     Hypertension Other         Review of Systems  Objective:    /82   Pulse 78   Ht 167.6 cm (65.98\")   SpO2 100%   BMI 27.45 kg/m² "     Neurology Exam:  General apperance: NAD.     Mental status: Alert, awake and oriented to time place and person.    Language and Speech: No aphasia or dysarthria.    CN II to XII: Intact.    Opthalmoscopic Exam: No papilledema.    Motor:  Right UE muscle strength 5/5. Normal tone.     Left UE muscle strength 5/5. Normal tone.      Right LE muscle strength 5/5. Normal tone.     Left LE muscle strength 5/5. Normal tone.      Sensory: Normal light touch, vibration and pinprick sensation bilaterally.    DTRs: 2+ bilaterally.    Babinski: Negative bilaterally.    Co-ordination: Normal finger-to-nose, heel to shin B/L.    Rhomberg: Negative.    Gait: Normal.    Cardiovascular: Regular rate and rhythm without murmur, gallop or rub.    Assessment and Plan:  1. Seizure-like activity  2. Intractable chronic migraine without aura and without status migrainosus  Patient with no new onset of episodes of seizure-like activity.  Based on the description of these events, there is a possibility that these episodes are nonepileptic in nature.  However epileptic nature of these episodes cannot be completely ruled out.  She has an EEG which was normal.  CT head and MRI has been normal as well.  She has had several seizure-like episodes and she returns to her baseline fairly quickly.  She has had 1 episode where she lost bladder control.  She is currently on Topamax 50 mg twice daily but it has not helped with these seizure-like episodes.  I will instead start her on lamotrigine starting at 25 mg slowly increasing to 100 mg twice daily next 6 to 8 weeks.  Will slowly taper her off of Topamax.  She is reporting increased amount of stress in the last 6 months.  I am hoping that lamotrigine will help with mood stabilization and if these episodes are truly epileptic then should help bring the episode frequency under good control as well.  She is also reporting worsening in migraine frequency and intensity and would like to restart Ajovy,  spoken to our pharmacy team and they will start approval process.  Based on the response to lamotrigine and Ajovy, further treatment options will be discussed with her.  As, I will see her back in clinic in 6 weeks for follow-up.       I spent 45 minutes in patient care: Reviewing records prior to the visit, entering orders and documentation and spent more than lobo 50% of this time face-to-face in management, instructions and education regarding above mentioned diagnosis and also on counseling and discussing about taking medication regularly, possible side effects with medication use, importance of good sleep hygiene, good hydration and regular exercise.    Return in about 6 weeks (around 5/29/2024).       Note to patient: The 21st Century Cures Act makes medical notes like these available to patients in the interest of transparency. However, be advised this is a medical document. It is intended as peer to peer communication. It is written in medical language and may contain abbreviations or verbiage that are unfamiliar. It may appear blunt or direct. Medical documents are intended to carry relevant information, facts as evident, and the clinical opinion of the physician.

## 2024-04-18 ENCOUNTER — SPECIALTY PHARMACY (OUTPATIENT)
Dept: NEUROLOGY | Facility: CLINIC | Age: 39
End: 2024-04-18
Payer: COMMERCIAL

## 2024-04-22 ENCOUNTER — SPECIALTY PHARMACY (OUTPATIENT)
Dept: LAB | Facility: HOSPITAL | Age: 39
End: 2024-04-22
Payer: COMMERCIAL

## 2024-04-22 RX ORDER — FREMANEZUMAB-VFRM 225 MG/1.5ML
225 INJECTION SUBCUTANEOUS
Qty: 1.5 ML | Refills: 11 | Status: SHIPPED | OUTPATIENT
Start: 2024-04-22

## 2024-05-13 RX ORDER — LAMOTRIGINE 25 MG/1
TABLET ORAL
Qty: 180 TABLET | Refills: 4 | Status: SHIPPED | OUTPATIENT
Start: 2024-05-13

## 2024-07-16 ENCOUNTER — TRANSCRIBE ORDERS (OUTPATIENT)
Dept: ADMINISTRATIVE | Facility: HOSPITAL | Age: 39
End: 2024-07-16
Payer: COMMERCIAL

## 2024-07-16 DIAGNOSIS — K29.60 EROSIVE GASTRITIS: Primary | ICD-10-CM

## 2024-07-16 DIAGNOSIS — E61.1 IRON DEFICIENCY: ICD-10-CM

## 2024-07-16 DIAGNOSIS — R10.2 PELVIC PAIN IN FEMALE: ICD-10-CM

## 2024-07-22 ENCOUNTER — HOSPITAL ENCOUNTER (OUTPATIENT)
Dept: CT IMAGING | Facility: HOSPITAL | Age: 39
Discharge: HOME | End: 2024-07-22
Payer: COMMERCIAL

## 2024-07-22 DIAGNOSIS — K29.60 EROSIVE GASTRITIS: ICD-10-CM

## 2024-07-22 DIAGNOSIS — E61.1 IRON DEFICIENCY: ICD-10-CM

## 2024-07-22 DIAGNOSIS — R10.2 PELVIC PAIN IN FEMALE: ICD-10-CM

## 2024-07-22 PROCEDURE — 74177 CT ABD & PELVIS W/CONTRAST: CPT

## 2024-07-22 PROCEDURE — 25510000001 IOPAMIDOL 61 % SOLUTION

## 2024-07-22 PROCEDURE — 74177 CT ABD & PELVIS W/CONTRAST: CPT | Performed by: RADIOLOGY

## 2024-07-22 RX ADMIN — IOPAMIDOL 100 ML: 612 INJECTION, SOLUTION INTRAVENOUS at 10:42

## 2024-07-25 RX ORDER — LAMOTRIGINE 25 MG/1
TABLET ORAL
Qty: 361.8 TABLET | Refills: 0 | OUTPATIENT
Start: 2024-07-25

## 2024-07-25 NOTE — TELEPHONE ENCOUNTER
"Relay     \"LVM with Wayne to see how she is taking Lamictal-- need to confirm dose and frequency\"                "

## 2024-07-25 NOTE — TELEPHONE ENCOUNTER
Caller: Diallo Spence    Relationship: Self    Best call back number: [phone]    Preferred pharmacy: [facility] ([facility]) - [address] [phone] [facility] [phone]       What was the call regarding: PT CONFIRMS SHE IS TAKING LAMOTRIGINE (LAMICTAL) 100MG TABLET- ONE TABLET TWICE DAILY.    PT CONFIRMS SHE HAS GREATER THAN A 3 DAY SUPPLY OF THE MEDICATION LEFT.    Do you require a callback: IF NEEDED.    PLEASE REVIEW AND ADVISE.

## 2024-08-22 ENCOUNTER — OFFICE VISIT (OUTPATIENT)
Dept: NEUROLOGY | Facility: CLINIC | Age: 39
End: 2024-08-22
Payer: COMMERCIAL

## 2024-08-22 VITALS
OXYGEN SATURATION: 100 % | BODY MASS INDEX: 27.45 KG/M2 | HEIGHT: 66 IN | SYSTOLIC BLOOD PRESSURE: 124 MMHG | HEART RATE: 76 BPM | DIASTOLIC BLOOD PRESSURE: 86 MMHG

## 2024-08-22 DIAGNOSIS — R56.9 SEIZURE-LIKE ACTIVITY: Primary | ICD-10-CM

## 2024-08-22 DIAGNOSIS — G43.719 INTRACTABLE CHRONIC MIGRAINE WITHOUT AURA AND WITHOUT STATUS MIGRAINOSUS: ICD-10-CM

## 2024-08-22 DIAGNOSIS — R25.1 TREMOR: ICD-10-CM

## 2024-08-22 PROCEDURE — 99214 OFFICE O/P EST MOD 30 MIN: CPT | Performed by: PSYCHIATRY & NEUROLOGY

## 2024-08-22 RX ORDER — ATOGEPANT 60 MG/1
60 TABLET ORAL DAILY
Qty: 30 TABLET | Refills: 11 | Status: SHIPPED | OUTPATIENT
Start: 2024-08-22 | End: 2024-09-21

## 2024-08-22 RX ORDER — LAMOTRIGINE 150 MG/1
150 TABLET ORAL 2 TIMES DAILY
Qty: 180 TABLET | Refills: 1 | Status: SHIPPED | OUTPATIENT
Start: 2024-08-22 | End: 2024-09-21

## 2024-08-22 RX ORDER — RIZATRIPTAN BENZOATE 10 MG/1
10 TABLET ORAL ONCE AS NEEDED
Qty: 10 TABLET | Refills: 3 | Status: SHIPPED | OUTPATIENT
Start: 2024-08-22 | End: 2024-09-21

## 2024-08-22 RX ORDER — VENLAFAXINE HYDROCHLORIDE 37.5 MG/1
37.5 CAPSULE, EXTENDED RELEASE ORAL DAILY
COMMUNITY

## 2024-10-18 RX ORDER — RIZATRIPTAN BENZOATE 10 MG/1
TABLET ORAL
Qty: 40 TABLET | Refills: 0 | OUTPATIENT
Start: 2024-10-18

## 2024-10-18 NOTE — TELEPHONE ENCOUNTER
Rx Refill Note  Requested Prescriptions     Pending Prescriptions Disp Refills    rizatriptan (MAXALT) 10 MG tablet [Pharmacy Med Name: RIZATRIPTAN 10 MG TABLET] 40 tablet 0     Sig: Take one tablet by mouth once as needed for migraine. May repeat in 2 hours if needed, as directed      Last filled: #10 with 3 refills 8/22/24, refill too soon    Last office visit with prescribing clinician: 8/22/2024      Next office visit with prescribing clinician: 12/11/2024     Cain Wall MA  10/18/24, 11:53 EDT

## 2025-02-22 ENCOUNTER — HOSPITAL ENCOUNTER (EMERGENCY)
Facility: HOSPITAL | Age: 40
Discharge: HOME | End: 2025-02-22
Payer: COMMERCIAL

## 2025-02-22 VITALS
OXYGEN SATURATION: 100 % | HEART RATE: 99 BPM | WEIGHT: 175 LBS | TEMPERATURE: 98 F | SYSTOLIC BLOOD PRESSURE: 111 MMHG | RESPIRATION RATE: 16 BRPM | DIASTOLIC BLOOD PRESSURE: 82 MMHG | BODY MASS INDEX: 28.12 KG/M2 | HEIGHT: 66 IN

## 2025-02-22 DIAGNOSIS — R56.9 SEIZURE: Primary | ICD-10-CM

## 2025-02-22 DIAGNOSIS — G43.909 MIGRAINE SYNDROME: ICD-10-CM

## 2025-02-22 LAB
ALBUMIN SERPL-MCNC: 4 G/DL (ref 3.5–5.2)
ALBUMIN/GLOB SERPL: 1.3 G/DL
ALP SERPL-CCNC: 93 U/L (ref 39–117)
ALT SERPL W P-5'-P-CCNC: 12 U/L (ref 1–33)
ANION GAP SERPL CALCULATED.3IONS-SCNC: 11.7 MMOL/L (ref 5–15)
AST SERPL-CCNC: 15 U/L (ref 1–32)
BASOPHILS # BLD AUTO: 0.06 10*3/MM3 (ref 0–0.2)
BASOPHILS NFR BLD AUTO: 0.8 % (ref 0–1.5)
BILIRUB SERPL-MCNC: 0.2 MG/DL (ref 0–1.2)
BUN SERPL-MCNC: 8 MG/DL (ref 6–20)
BUN/CREAT SERPL: 9.3 (ref 7–25)
CALCIUM SPEC-SCNC: 8.8 MG/DL (ref 8.6–10.5)
CHLORIDE SERPL-SCNC: 102 MMOL/L (ref 98–107)
CO2 SERPL-SCNC: 24.3 MMOL/L (ref 22–29)
CREAT SERPL-MCNC: 0.86 MG/DL (ref 0.57–1)
DEPRECATED RDW RBC AUTO: 46.1 FL (ref 37–54)
EGFRCR SERPLBLD CKD-EPI 2021: 89.4 ML/MIN/1.73
EOSINOPHIL # BLD AUTO: 0.19 10*3/MM3 (ref 0–0.4)
EOSINOPHIL NFR BLD AUTO: 2.4 % (ref 0.3–6.2)
ERYTHROCYTE [DISTWIDTH] IN BLOOD BY AUTOMATED COUNT: 13.6 % (ref 12.3–15.4)
GLOBULIN UR ELPH-MCNC: 3 GM/DL
GLUCOSE SERPL-MCNC: 93 MG/DL (ref 65–99)
HCT VFR BLD AUTO: 36.1 % (ref 34–46.6)
HGB BLD-MCNC: 11.8 G/DL (ref 12–15.9)
IMM GRANULOCYTES # BLD AUTO: 0.01 10*3/MM3 (ref 0–0.05)
IMM GRANULOCYTES NFR BLD AUTO: 0.1 % (ref 0–0.5)
LYMPHOCYTES # BLD AUTO: 2.92 10*3/MM3 (ref 0.7–3.1)
LYMPHOCYTES NFR BLD AUTO: 37.5 % (ref 19.6–45.3)
MCH RBC QN AUTO: 29.7 PG (ref 26.6–33)
MCHC RBC AUTO-ENTMCNC: 32.7 G/DL (ref 31.5–35.7)
MCV RBC AUTO: 90.9 FL (ref 79–97)
MONOCYTES # BLD AUTO: 0.6 10*3/MM3 (ref 0.1–0.9)
MONOCYTES NFR BLD AUTO: 7.7 % (ref 5–12)
NEUTROPHILS NFR BLD AUTO: 4 10*3/MM3 (ref 1.7–7)
NEUTROPHILS NFR BLD AUTO: 51.5 % (ref 42.7–76)
NRBC BLD AUTO-RTO: 0 /100 WBC (ref 0–0.2)
PLATELET # BLD AUTO: 305 10*3/MM3 (ref 140–450)
PMV BLD AUTO: 9.5 FL (ref 6–12)
POTASSIUM SERPL-SCNC: 2.9 MMOL/L (ref 3.5–5.2)
PROT SERPL-MCNC: 7 G/DL (ref 6–8.5)
RBC # BLD AUTO: 3.97 10*6/MM3 (ref 3.77–5.28)
SODIUM SERPL-SCNC: 138 MMOL/L (ref 136–145)
WBC NRBC COR # BLD AUTO: 7.78 10*3/MM3 (ref 3.4–10.8)

## 2025-02-22 PROCEDURE — 80053 COMPREHEN METABOLIC PANEL: CPT

## 2025-02-22 PROCEDURE — 85025 COMPLETE CBC W/AUTO DIFF WBC: CPT

## 2025-02-22 PROCEDURE — 25010000002 LORAZEPAM PER 2 MG

## 2025-02-22 PROCEDURE — 99283 EMERGENCY DEPT VISIT LOW MDM: CPT

## 2025-02-22 PROCEDURE — 80175 DRUG SCREEN QUAN LAMOTRIGINE: CPT

## 2025-02-22 PROCEDURE — 36415 COLL VENOUS BLD VENIPUNCTURE: CPT

## 2025-02-22 PROCEDURE — 96374 THER/PROPH/DIAG INJ IV PUSH: CPT

## 2025-02-22 RX ORDER — POTASSIUM CHLORIDE 1500 MG/1
20 TABLET, EXTENDED RELEASE ORAL 2 TIMES DAILY
Qty: 10 TABLET | Refills: 0 | Status: SHIPPED | OUTPATIENT
Start: 2025-02-22 | End: 2025-02-27

## 2025-02-22 RX ORDER — LORAZEPAM 2 MG/ML
2 INJECTION INTRAMUSCULAR ONCE
Status: COMPLETED | OUTPATIENT
Start: 2025-02-22 | End: 2025-02-22

## 2025-02-22 RX ORDER — LAMOTRIGINE 25 MG/1
25 TABLET ORAL DAILY
Qty: 30 TABLET | Refills: 0 | Status: SHIPPED | OUTPATIENT
Start: 2025-02-22 | End: 2025-03-24

## 2025-02-22 RX ORDER — MAGNESIUM OXIDE 400 MG/1
400 TABLET ORAL DAILY
Qty: 5 TABLET | Refills: 0 | Status: SHIPPED | OUTPATIENT
Start: 2025-02-22 | End: 2025-02-27

## 2025-02-22 RX ORDER — LAMOTRIGINE 150 MG/1
150 TABLET ORAL 2 TIMES DAILY
Qty: 60 TABLET | Refills: 0 | Status: SHIPPED | OUTPATIENT
Start: 2025-02-22 | End: 2025-03-24

## 2025-02-22 RX ADMIN — LORAZEPAM 2 MG: 2 INJECTION INTRAMUSCULAR; INTRAVENOUS at 04:35

## 2025-02-22 NOTE — ED NOTES
Patient placed on Cerebell at this time to monitor seizures.  Seizure pads were placed on railings.

## 2025-02-22 NOTE — ED NOTES
RN made pt at 0433 in [facility]ell to override pull ativan verbal order for pt having active seizure. Called pharmacy to link order on made up pt in [facility] and pharmacy unsure. 2mg ativan override pulled on made up pt given at 0435 to pt.

## 2025-02-22 NOTE — ED PROVIDER NOTES
Subjective   History of Present Illness  This 39-year-old female has a known seizure disorder.  She started since becomes unresponsive here in the department has a tremor in her right hand.  Curls up in a ball and to the floor.  She is given 2 mg of Ativan put up on a stretcher and into room 13.  She did not have a grand mal seizure just kind of shaking in her right arm maybe her right leg.  And she has also been unresponsive.    Patient says that the seizures are part of her migraine syndrome.  She does have a mild headache now.      Review of Systems   Constitutional: Negative.  Negative for activity change and fatigue.        Patient says she just does not feel right.   HENT:  Negative for congestion, mouth sores and sneezing.    Eyes:  Negative for discharge.   Respiratory:  Negative for shortness of breath.    Cardiovascular:  Negative for chest pain.   Gastrointestinal:  Negative for abdominal distention and abdominal pain.   Endocrine: Negative for cold intolerance.   Genitourinary:  Negative for difficulty urinating and dysuria.   Musculoskeletal:  Negative for arthralgias.   Neurological:  Positive for headaches. Negative for dizziness.   Psychiatric/Behavioral:  Negative for behavioral problems.    All other systems reviewed and are negative.      Past Medical History:   Diagnosis Date   • ADHD (attention deficit hyperactivity disorder)    • Anxiety    • Cluster headache    • Colitis 2020    11/3/2020 had colonoscopy and recommend repeat in 5 years   • Depression    • Fatty liver    • GERD (gastroesophageal reflux disease)    • Headache, tension-type    • Hiatal hernia    • Liver hemangioma 06/15/2022   • Liver hemangioma    • Liver problem    • Migraine headache    • Panic disorder    • Peripheral neuropathy    • Psychiatric illness    • Self-injurious behavior        Allergies   Allergen Reactions   • Latex Rash       Past Surgical History:   Procedure Laterality Date   •  SECTION     •  CHOLECYSTECTOMY     • COLONOSCOPY  2008    / BEBETO Reynolds- Normal    • COLONOSCOPY N/A 08/30/2016    Procedure: COLONOSCOPY CPT CODE: 04429;  Surgeon: Sherry Muse DO;  Location:  [facility];  Service:    • ENDOSCOPY N/A 08/30/2016    Procedure: ESOPHAGOGASTRODUODENOSCOPY WITH BIOPSY CPT CODE: 94411;  Surgeon: Sherry Muse DO;  Location:  [facility];  Service:    • GALLBLADDER SURGERY     • NERVE SURGERY      Right arm    • SHOULDER SURGERY     • TONSILLECTOMY     • UPPER GASTROINTESTINAL ENDOSCOPY  2010    Dr. Jeter/ Small hiatal hernia        Family History   Problem Relation Age of Onset   • No Known Problems Mother    • Diabetes Father    • Atrial fibrillation Father    • Hypertension Father    • Hyperlipidemia Father    • Drug abuse Brother    • Anxiety disorder Brother    • Alcohol abuse Brother    • Drug abuse Brother    • Depression Brother    • Heart disease Maternal Grandmother    • Bone cancer Paternal Grandfather    • Diabetes Paternal Grandfather    • Dementia Paternal Grandmother    • Heart disease Paternal Grandmother    • Stroke Paternal Grandmother    • Diabetes Other    • Heart disease Other    • Hypertension Other        Social History     Socioeconomic History   • Marital status:      Spouse name: Davon Goel    • Number of children: 1   • Years of education: [employer] - [employer]    Tobacco Use   • Smoking status: Never     Passive exposure: Never   • Smokeless tobacco: Never   Vaping Use   • Vaping status: Never Used   Substance and Sexual Activity   • Alcohol use: Not Currently     Comment: Occasionally   • Drug use: No   • Sexual activity: Yes     Partners: Male           Objective   Physical Exam    Procedures           ED Course  ED Course as of 02/22/25 0617   Sat Feb 22, 2025   0611 CBC and CMP is good.  Lamictal levels pending.  Patient does not feel quite right so we will let her sleep it off a little bit more. [GP]      ED Course User Index  [GP]  Martínez Cedeño MD                                                       Medical Decision Making  Amount and/or Complexity of Data Reviewed  Labs: ordered.        Final diagnoses:   None       ED Disposition  ED Disposition       None            No follow-up provider specified.       Medication List      No changes were made to your prescriptions during this visit.          LaMICtal  Take 1 tablet by mouth 2 (Two) Times a Day for 30 days.  What changed: Another medication with the same name was added. Make sure you understand how and when to take each.     * lamoTRIgine 25 MG tablet  Commonly known as: LaMICtal  Take 1 tablet by mouth Daily for 30 days.  What changed: You were already taking a medication with the same name, and this prescription was added. Make sure you understand how and when to take each.           * This list has 2 medication(s) that are the same as other medications prescribed for you. Read the directions carefully, and ask your doctor or other care provider to review them with you.                ASK your doctor about these medications      magnesium oxide 400 MG tablet  Commonly known as: MAG-OX  Take 1 tablet by mouth Daily for 5 days.  Ask about: Should I take this medication?     potassium chloride 20 MEQ CR tablet  Commonly known as: KLOR-CON M20  Take 1 tablet by mouth 2 (Two) Times a Day for 5 days.  Ask about: Should I take this medication?               Where to Get Your Medications        These medications were sent to [facility][id number] [address] - [phone] PH - [phone]   [address] [place] [id number]      Phone: [phone]   lamoTRIgine 150 MG tablet  lamoTRIgine 25 MG tablet  magnesium oxide 400 MG tablet  potassium chloride 20 MEQ CR tablet            Martínez Cedeño MD  03/01/25 0141

## 2025-02-25 LAB — LAMOTRIGINE SERPL-MCNC: 6 UG/ML (ref 2–20)

## 2025-03-17 ENCOUNTER — TELEPHONE (OUTPATIENT)
Dept: CARDIOLOGY | Facility: CLINIC | Age: 40
End: 2025-03-17
Payer: COMMERCIAL

## 2025-03-17 NOTE — TELEPHONE ENCOUNTER
Caller: Diallo Spence    Relationship to patient: Self    Best call back number: [phone]    Chief complaint: PATIENT HAS NOT BEEN SEEN IN TWO YEARS PER WORKFLOW SEND TE TO SEE ABOUT SCHEDULING     Type of visit: FOLLOW-UP    Requested date: ASAP

## 2025-03-21 NOTE — TELEPHONE ENCOUNTER
Patient called wanted to know if we take new primary care patients I told her no we are cardiology now for new patients.

## 2025-05-15 ENCOUNTER — SPECIALTY PHARMACY (OUTPATIENT)
Dept: NEUROLOGY | Facility: CLINIC | Age: 40
End: 2025-05-15
Payer: COMMERCIAL

## 2025-08-15 ENCOUNTER — TELEPHONE (OUTPATIENT)
Dept: NEUROLOGY | Facility: CLINIC | Age: 40
End: 2025-08-15
Payer: COMMERCIAL